# Patient Record
Sex: MALE | Race: BLACK OR AFRICAN AMERICAN | NOT HISPANIC OR LATINO | Employment: OTHER | URBAN - METROPOLITAN AREA
[De-identification: names, ages, dates, MRNs, and addresses within clinical notes are randomized per-mention and may not be internally consistent; named-entity substitution may affect disease eponyms.]

---

## 2019-06-01 LAB
CHOL/HDLC RATIO: 4.9
CHOLEST SERPL-MSCNC: 187 MG/DL (ref 0–200)
HDLC SERPL-MCNC: 38 MG/DL
LDLC SERPL CALC-MCNC: 138 MG/DL
TRIGL SERPL-MCNC: 56 MG/DL
TSH: 0.85

## 2019-06-13 LAB
ANION GAP SERPL CALC-SCNC: 8 MMOL/L
CALCIUM SERPL-MCNC: 8.6 MG/DL
CHLORIDE: 104
CO2 SERPL-SCNC: 23 MMOL/L
CREAT SERPL-MCNC: 1.2 MG/DL
GLUCOSE: 96
POTASSIUM: 4.2
SODIUM: 135
UREA NITROGEN (BUN): 14

## 2019-07-18 ENCOUNTER — PATIENT OUTREACH (OUTPATIENT)
Dept: ADMINISTRATIVE | Facility: HOSPITAL | Age: 69
End: 2019-07-18

## 2019-07-19 ENCOUNTER — LAB VISIT (OUTPATIENT)
Dept: LAB | Facility: HOSPITAL | Age: 69
End: 2019-07-19
Attending: FAMILY MEDICINE
Payer: MEDICARE

## 2019-07-19 ENCOUNTER — OFFICE VISIT (OUTPATIENT)
Dept: FAMILY MEDICINE | Facility: CLINIC | Age: 69
End: 2019-07-19
Payer: MEDICARE

## 2019-07-19 VITALS
DIASTOLIC BLOOD PRESSURE: 76 MMHG | SYSTOLIC BLOOD PRESSURE: 114 MMHG | WEIGHT: 149 LBS | BODY MASS INDEX: 24.83 KG/M2 | HEIGHT: 65 IN | TEMPERATURE: 98 F

## 2019-07-19 DIAGNOSIS — Z12.11 COLON CANCER SCREENING: ICD-10-CM

## 2019-07-19 DIAGNOSIS — R73.03 PREDIABETES: ICD-10-CM

## 2019-07-19 DIAGNOSIS — Z23 NEED FOR PNEUMOCOCCAL VACCINATION: ICD-10-CM

## 2019-07-19 DIAGNOSIS — Z11.59 NEED FOR HEPATITIS C SCREENING TEST: ICD-10-CM

## 2019-07-19 DIAGNOSIS — Z79.899 ENCOUNTER FOR LONG-TERM (CURRENT) USE OF MEDICATIONS: ICD-10-CM

## 2019-07-19 DIAGNOSIS — I63.011 CEREBROVASCULAR ACCIDENT (CVA) DUE TO THROMBOSIS OF RIGHT VERTEBRAL ARTERY: Primary | ICD-10-CM

## 2019-07-19 PROBLEM — I63.9 CEREBROVASCULAR ACCIDENT (CVA): Status: ACTIVE | Noted: 2019-05-31

## 2019-07-19 PROBLEM — N17.9 ACUTE KIDNEY INJURY: Status: ACTIVE | Noted: 2019-06-08

## 2019-07-19 PROBLEM — I69.398 ABNORMALITY OF GAIT AS LATE EFFECT OF CEREBROVASCULAR ACCIDENT (CVA): Status: ACTIVE | Noted: 2019-06-04

## 2019-07-19 PROBLEM — R26.9 ABNORMALITY OF GAIT AS LATE EFFECT OF CEREBROVASCULAR ACCIDENT (CVA): Status: ACTIVE | Noted: 2019-06-04

## 2019-07-19 LAB
ESTIMATED AVG GLUCOSE: 128 MG/DL (ref 68–131)
HBA1C MFR BLD HPLC: 6.1 % (ref 4–5.6)

## 2019-07-19 PROCEDURE — G0009 ADMIN PNEUMOCOCCAL VACCINE: HCPCS | Mod: S$GLB,ICN,, | Performed by: FAMILY MEDICINE

## 2019-07-19 PROCEDURE — 86803 HEPATITIS C AB TEST: CPT

## 2019-07-19 PROCEDURE — 99205 OFFICE O/P NEW HI 60 MIN: CPT | Mod: 25,S$GLB,ICN, | Performed by: FAMILY MEDICINE

## 2019-07-19 PROCEDURE — G0009 PNEUMOCOCCAL CONJUGATE VACCINE 13-VALENT LESS THAN 5YO & GREATER THAN: ICD-10-PCS | Mod: S$GLB,ICN,, | Performed by: FAMILY MEDICINE

## 2019-07-19 PROCEDURE — 99999 PR PBB SHADOW E&M-EST. PATIENT-LVL IV: ICD-10-PCS | Mod: PBBFAC,,, | Performed by: FAMILY MEDICINE

## 2019-07-19 PROCEDURE — 99999 PR PBB SHADOW E&M-EST. PATIENT-LVL IV: CPT | Mod: PBBFAC,,, | Performed by: FAMILY MEDICINE

## 2019-07-19 PROCEDURE — 99205 PR OFFICE/OUTPT VISIT, NEW, LEVL V, 60-74 MIN: ICD-10-PCS | Mod: 25,S$GLB,ICN, | Performed by: FAMILY MEDICINE

## 2019-07-19 PROCEDURE — 36415 COLL VENOUS BLD VENIPUNCTURE: CPT | Mod: PO

## 2019-07-19 PROCEDURE — 1101F PT FALLS ASSESS-DOCD LE1/YR: CPT | Mod: CPTII,S$GLB,ICN, | Performed by: FAMILY MEDICINE

## 2019-07-19 PROCEDURE — 1101F PR PT FALLS ASSESS DOC 0-1 FALLS W/OUT INJ PAST YR: ICD-10-PCS | Mod: CPTII,S$GLB,ICN, | Performed by: FAMILY MEDICINE

## 2019-07-19 PROCEDURE — 90670 PCV13 VACCINE IM: CPT | Mod: S$GLB,ICN,, | Performed by: FAMILY MEDICINE

## 2019-07-19 PROCEDURE — 83036 HEMOGLOBIN GLYCOSYLATED A1C: CPT

## 2019-07-19 PROCEDURE — 90670 PNEUMOCOCCAL CONJUGATE VACCINE 13-VALENT LESS THAN 5YO & GREATER THAN: ICD-10-PCS | Mod: S$GLB,ICN,, | Performed by: FAMILY MEDICINE

## 2019-07-19 RX ORDER — HYDRALAZINE HYDROCHLORIDE 25 MG/1
TABLET, FILM COATED ORAL
Refills: 1 | COMMUNITY
Start: 2019-07-09 | End: 2019-08-09 | Stop reason: SDUPTHER

## 2019-07-19 RX ORDER — ATORVASTATIN CALCIUM 40 MG/1
TABLET, FILM COATED ORAL
Refills: 1 | COMMUNITY
Start: 2019-07-09 | End: 2019-08-09 | Stop reason: SDUPTHER

## 2019-07-19 RX ORDER — ASPIRIN 81 MG/1
TABLET ORAL
Refills: 1 | COMMUNITY
Start: 2019-07-09 | End: 2019-08-09 | Stop reason: SDUPTHER

## 2019-07-19 RX ORDER — AMLODIPINE BESYLATE 10 MG/1
TABLET ORAL
Refills: 1 | COMMUNITY
Start: 2019-07-09 | End: 2019-08-09 | Stop reason: SDUPTHER

## 2019-07-19 RX ORDER — POTASSIUM CHLORIDE 20 MEQ/1
TABLET, EXTENDED RELEASE ORAL
Refills: 1 | COMMUNITY
Start: 2019-07-09 | End: 2019-08-09 | Stop reason: SDUPTHER

## 2019-07-19 RX ORDER — FAMOTIDINE 20 MG/1
TABLET, FILM COATED ORAL
Refills: 1 | COMMUNITY
Start: 2019-07-09 | End: 2019-08-09 | Stop reason: SDUPTHER

## 2019-07-19 NOTE — PROGRESS NOTES
Subjective:      Patient ID: Jose R Coffman is a 69 y.o. male.    Chief Complaint: Establish Care      Problem List Items Addressed This Visit     Cerebrovascular accident (CVA) - Primary    Overview     Subacute.  New problem.  Patient experienced stroke starting May 31, 2019.  Patient was started on aspirin statin and blood pressure medications.  Blood pressure well controlled today.  Patient to go to rehab for few days.  Was having some lower extremity weakness and left upper arm weakness.  His strength is improving.  Still continuing physical therapy at [facility].    MRI report from [facility]:   1.  Small acute ischemic infarct of the posterior limb of the right internal capsule.   2.  Chronic lacunar infarcts of the right basal ganglia and right cerebellar hemisphere.  3. Minimal white matter changes, although nonspecific, likely indicate leukomalacia of chronic microvascular ischemia.         Electronically signed by Ger Arthur MD on 5/31/2019 1:23 PM         Current Assessment & Plan     Reviewed records from previous hospitalization.  Continue physical therapy.  Continue medications for blood pressure as well as aspirin statin and checking diabetes status.         Colon cancer screening    Overview     Patient has never had screening colonoscopy.  Denies family history of colon cancer.         Prediabetes    Overview     Last hemoglobin A1c was a couple of years ago was prediabetic range.  Patient not currently on any metformin or other diabetic medication.  Recently diagnosed with stroke last month.    Patient not currently seeing eye doctor.  Patient has no issues with his feet currently.         Need for hepatitis C screening test    Overview     Patient has never been screened for hepatitis C.  Born in 1950.         Need for pneumococcal vaccination    Overview     Patient has never had pneumonia vaccine.  He is 69 years old and has prediabetes.         Encounter for long-term (current) use of  medications    Overview     07/19/2019   Patient is on CHRONIC long-term drug therapy for managed conditions. See medication list. Reports compliance.  No side effects reported.  Routine lab work is being monitored.  Patient does not need refills today.     Reviewed blood work from recent hospitalization.  Patient has hyperglycemia without recent A1c.  Patient was prediabetic years ago.  Not currently on metformin.  Creatinine was preserved.  Blood counts were normal.    TSH has been normal in the past.    No results found for: WBC, HGB, HCT, MCV, PLT   CMP  Sodium   Date Value Ref Range Status   06/13/2019 135  Final     Potassium   Date Value Ref Range Status   06/13/2019 4.2  Final     Chloride   Date Value Ref Range Status   06/13/2019 104  Final     CO2   Date Value Ref Range Status   06/13/2019 23 mmol/L Final     BUN, Bld   Date Value Ref Range Status   06/13/2019 14  Final     Creatinine   Date Value Ref Range Status   06/13/2019 1.2 mg/dL Final     Calcium   Date Value Ref Range Status   06/13/2019 8.6 mg/dL Final     Anion Gap   Date Value Ref Range Status   06/13/2019 8 mmol/L Final     No results found for: TSH            Current Assessment & Plan     Complete history and physical was completed today.  Complete and thorough medication reconciliation was performed.  Discussed risks and benefits of medications.  Advised patient on orders and health maintenance.  We discussed old records and old labs if available.  Will request any records not available through epic.  Continue current medications listed on your summary sheet.                  Past Medical History:  Past Medical History:   Diagnosis Date    Hyperlipidemia     Hypertension     Stroke      History reviewed. No pertinent surgical history.  Review of patient's allergies indicates:  No Known Allergies  Current Outpatient Medications on File Prior to Visit   Medication Sig Dispense Refill    amLODIPine (NORVASC) 10 MG tablet Take 1 tablet (10  mg total) by mouth daily  1    aspirin (ECOTRIN) 81 MG EC tablet Chew 1 tablet (81 mg total) by mouth daily  1    atorvastatin (LIPITOR) 40 MG tablet Take 1 tablet (40 mg total) by mouth daily  1    famotidine (PEPCID) 20 MG tablet Take 1 tablet (20 mg total) by mouth 2 (two) times daily  1    hydrALAZINE (APRESOLINE) 25 MG tablet Take 1 tablet (25 mg total) by mouth 3 (three) times daily  1    potassium chloride SA (K-DUR,KLOR-CON) 20 MEQ tablet Take 1 tablet (20 mEq total) by mouth daily  1     No current facility-administered medications on file prior to visit.      Social History     Socioeconomic History    Marital status:      Spouse name: Not on file    Number of children: Not on file    Years of education: Not on file    Highest education level: Not on file   Occupational History    Not on file   Social Needs    Financial resource strain: Not on file    Food insecurity:     Worry: Not on file     Inability: Not on file    Transportation needs:     Medical: Not on file     Non-medical: Not on file   Tobacco Use    Smoking status: Never Smoker   Substance and Sexual Activity    Alcohol use: Not on file    Drug use: Not on file    Sexual activity: Not on file   Lifestyle    Physical activity:     Days per week: Not on file     Minutes per session: Not on file    Stress: Not on file   Relationships    Social connections:     Talks on phone: Not on file     Gets together: Not on file     Attends Anabaptism service: Not on file     Active member of club or organization: Not on file     Attends meetings of clubs or organizations: Not on file     Relationship status: Not on file   Other Topics Concern    Not on file   Social History Narrative    Not on file     Family History   Problem Relation Age of Onset    Cancer Mother         throat    Heart disease Father     Heart disease Brother     Cancer Brother        I have reviewed the complete PMH, social history, surgical history,  "allergies and medications.  As well as family history.    Review of Systems   Constitutional: Negative for chills, fatigue, fever and unexpected weight change.   HENT: Negative for ear pain and sore throat.    Eyes: Negative for redness and visual disturbance.   Respiratory: Negative for cough and shortness of breath.    Cardiovascular: Negative for chest pain and palpitations.   Gastrointestinal: Negative for nausea and vomiting.   Endocrine: Negative for cold intolerance and heat intolerance.   Genitourinary: Negative for difficulty urinating and hematuria.   Musculoskeletal: Negative for arthralgias and myalgias.   Skin: Negative for rash and wound.   Allergic/Immunologic: Negative for environmental allergies and food allergies.   Neurological: Positive for weakness (Improving since the stroke June 2019). Negative for headaches.   Hematological: Negative for adenopathy. Does not bruise/bleed easily.   Psychiatric/Behavioral: Negative for sleep disturbance. The patient is not nervous/anxious.        Objective:     /75   Temp 98.2 °F (36.8 °C)   Ht 5' 5" (1.651 m)   Wt 67.6 kg (149 lb)   BMI 24.79 kg/m²     Physical Exam   Constitutional: He is oriented to person, place, and time. He appears well-developed and well-nourished. No distress.   HENT:   Head: Normocephalic and atraumatic.   Eyes: Pupils are equal, round, and reactive to light. EOM are normal.   Neck: Normal range of motion. Neck supple.   Cardiovascular: Normal rate, regular rhythm, normal heart sounds and intact distal pulses.   No murmur heard.  Pulmonary/Chest: Effort normal and breath sounds normal. No respiratory distress. He has no wheezes.   Musculoskeletal: Normal range of motion. He exhibits no edema.   Neurological: He is alert and oriented to person, place, and time. No cranial nerve deficit. He exhibits normal muscle tone. He displays no seizure activity. Coordination and gait normal.   Left upper extremity weakness greater than " right.  Bilateral lower extremity slightly weak.   Skin: Skin is warm and dry. Capillary refill takes less than 2 seconds.   Psychiatric: He has a normal mood and affect. His behavior is normal.   Nursing note and vitals reviewed.      Assessment:     1. Cerebrovascular accident (CVA) due to thrombosis of right vertebral artery    2. Colon cancer screening    3. Prediabetes    4. Need for hepatitis C screening test    5. Need for pneumococcal vaccination    6. Encounter for long-term (current) use of medications        Plan:     I have Reviewed and summarized old records.  I have performed thorough medication reconciliation today and discussed risk and benefits of each medication.  I have reviewed labs and discussed with patient.  All questions were answered.  I am requesting old records and will review them once they are available.    Problem List Items Addressed This Visit     Cerebrovascular accident (CVA) - Primary     Reviewed records from previous hospitalization.  Continue physical therapy.  Continue medications for blood pressure as well as aspirin statin and checking diabetes status.         Colon cancer screening     Patient requesting see Dr. reid at [facility].         Relevant Orders    Ambulatory referral to General Surgery    Prediabetes    Relevant Orders    Hemoglobin A1c    Ambulatory referral to Optometry    Need for hepatitis C screening test    Relevant Orders    Hepatitis C antibody    Need for pneumococcal vaccination    Relevant Orders    Pneumococcal Conjugate Vaccine (13 Valent) (IM) (Completed)    Encounter for long-term (current) use of medications     Complete history and physical was completed today.  Complete and thorough medication reconciliation was performed.  Discussed risks and benefits of medications.  Advised patient on orders and health maintenance.  We discussed old records and old labs if available.  Will request any records not available through epic.  Continue current  medications listed on your summary sheet.                 Follow up in about 3 months (around 10/19/2019) for HTN, Med refills.    DISCLAIMER: This note was compiled by using a speech recognition dictation system and therefore please be aware that typographical / speech recognition errors can and do occur.  Please contact me if you see any errors specifically.    aLndon Smith MD  We Offer Telehealth & Same Day Appointments!   Book your Telehealth appointment with me through my nurse or   Clinic appointments on ticckle!    Office: [phone]          Check out my Facebook Page and Follow Me at: CLICK HERE    Check out my website at [facility] by clicking on: CLICK HERE    To Schedule appointments online, go to ticckle: CLICK HERE     Location: [email or web address]    [address] [place] [address]    FAX: [phone]

## 2019-07-19 NOTE — PATIENT INSTRUCTIONS
Healthy Heart Diet  GENERAL INFORMATION:  What is a heart healthy diet? The goal of a heart healthy diet is to decrease your risk for heart disease. There are several health conditions that can increase your risk for heart disease. Some of these conditions include unhealthy blood cholesterol levels, high blood pressure, and obesity (weighing more than your caregiver recommends). If you have any of these conditions, you should make diet and lifestyle changes as part of your treatment plan. People who have had a heart attack or stroke also should follow the heart healthy diet. The heart healthy diet may help to decrease the risk of having another heart attack or stroke.  What should I know about the different types of fat in my diet?   Unhealthy fats: A diet that is high in cholesterol, saturated fat, and trans fat may cause unhealthy cholesterol levels.    Cholesterol: Limit intake of cholesterol to less than 200 mg per day. Cholesterol is found in meat, eggs, and dairy (milk, cheese).    Saturated fat: Limit saturated fat to less than seven percent of total daily calories. Ask your caregiver how many calories you need each day. Saturated fats are found in meat and dairy.    Trans fat: Limit trans fat to less than one percent of total calories. Ask your caregiver how many calories you need each day. Foods that say trans fat free on the label may still have up to 0.5 grams of fat per serving. Trans fats are used in fried and baked foods.  Healthy fats: Unsaturated fats can be healthy for you and can help to improve your cholesterol levels. Increase your intake of healthy fats by replacing saturated and trans fats in your diet with unsaturated fat.     Monounsaturated fats: Monounsaturated fats are found in nuts and vegetable oils, such as olive, canola, safflower, and sunflower.    Polyunsaturated fats: Unsaturated fat can be found in vegetable oils, such as soybean or corn. Omega-3 fats are a type of polyunsaturated  fat that can help to decrease the risk of heart disease. Omega-3 fats are found in fish, such as salmon, herring, trout, and tuna. Omega-3 fats can also be found in plant foods, such as walnuts, flaxseed, soybeans, and canola oil.  What are other diet guidelines I should follow?   Maintain a healthy weight: Your risk of heart disease is higher if you are overweight. Your caregiver may suggest that you lose weight if you are overweight. The following are some diet changes you can make to lose weight.     Eat fewer calories: A healthy way of decreasing calories is to eat fewer foods that have added sugars and fats. Foods that are have added sugars are also high in calories, which can cause you to gain weight. Some foods that have added sugars are sweet drinks (soda and fruit drinks), candy, cakes, cookies, and pies.    Eat smaller portions: You can also decrease calories in your diet by eating smaller portions at each meal and eating fewer snacks. Ask your caregiver for more information about how to lose weight.  Decrease sodium in your diet to less than 2300 mg each day: Sodium is found in table salt and foods that have added salt. A diet that is lower in sodium may decrease blood pressure or prevent high blood pressure. Keep your blood pressure within a normal range to decrease your risk of stroke, heart disease, and heart failure.    Include omega-3 fats in your diet: Eat two servings of fish per week. One serving is about four ounces. Fish is a good source of healthy omega-3 fats. Most fish contain some mercury, but many fish contain levels that are not harmful to most people. Higher amounts of mercury can be harmful to pregnant women and children. Children and pregnant women should avoid eating fish high in mercury, such as shark or swordfish. Fish that have lower amounts of mercury include salmon, canned light tuna, and catfish.    Include high fiber foods in your diet each day: You can decrease your risk of  heart disease by following a diet that is high in fiber. Include fruit and vegetables, legumes (beans), and whole-grain foods in your diet each day to get enough fiber.    Limit alcohol: Limit the amount of alcohol you drink. Drinking too much can damage your brain, heart, and liver. The risk of getting high blood pressure and certain types of cancer are greater for people who drink too much alcohol. Drinking too much alcohol also increases the risk of having a stroke. Women should limit alcohol to one drink a day. Men should limit alcohol to two drinks a day. A drink of alcohol is 12 ounces of beer, or five ounces of wine. One and one-half ounces of liquor, such as whiskey, is one drink of alcohol. If you drink alcohol, talk to your caregiver.   What should I avoid eating and drinking while on a heart healthy diet? Learn to read labels on packaged foods before buying them. Ask your caregiver for more information about how to read food labels. The following foods are high in fat, saturated fat, cholesterol, and sodium.   Bread and other carbohydrates:     High-fat baked goods, such as doughnuts, pastries, cookies, and biscuits.    Chips, snack mixes, regular crackers, and flavored popcorn.    Pretzels, salted nuts (high in sodium).  Fruit and vegetables:     Regular, canned vegetables (high in sodium).    Fried vegetables or vegetables in butter or high-fat sauces.    Fried fruit, or fruit served with cream.  Dairy:     Whole milk, two percent milk, half-and-half creamer.    Cheese, cream cheese, sour cream.  Meats and meat substitutes:     High-fat cuts of meat (T-bone steak, regular hamburger, ribs, ortiz, sausage).    Cold cuts and hot dogs.    Whole eggs and egg yolks (limit to three servings or less per week).  Fats:     Butter, hard margarine, shortening, partially hydrogenated or tropical (coconut, palm) oils.    Other:     Salt or seasonings made with salt (high in sodium).    Soy sauce, miso soup, canned or  dried soups (high in sodium).    Ketchup, barbecue sauce, and other high-sodium sauces.    High-fat gravy and sauces, such as Oziel or cheese sauces.  What can I eat and drink while on a heart healthy diet? Ask your dietitian or caregiver how many servings to eat each day from each of the following groups of foods. The amount of servings you should eat from each food group will depend on your daily calorie needs.   Breads and other carbohydrates:     Whole grain breads, cereals (oatmeal), and pasta.    Brown rice.    Low fat, low-sodium crackers and pretzels.  Fruits and vegetables:     Fresh, frozen, or canned vegetables (no salt or low-sodium).    Fresh, frozen, dried, or canned fruit (canned in light syrup or fruit juice).  Dairy:     Nonfat (skim), one-half percent, or one percent milk.    Nonfat or low fat yogurt or cottage cheese.    Fat free or low fat cheese.  Meats and meat products:     Fish and poultry (chicken, turkey) with no skin.    Lean beef and pork (loin, round, extra lean hamburger).    Dried beans and peas, unsalted nuts, soy products.    Egg whites and substitutes.  Fats:     Unsaturated oils (olive, soy, peanut, canola, safflower, sunflower).    Vegetable oil spreads or soft margarine.    Avocado.  Other:     Herbs and spices in place of salt.    Low fat snacks (unsalted pretzels, plain popcorn).  What are some other lifestyle changes I should make?   Do not smoke: Smoking causes lung cancer and other long-term lung diseases. It increases your risk of many cancer types. Smoking also increases your risk of blood vessel disease, heart attack, and vision disorders. Not smoking may help prevent such symptoms as headaches and dizziness for yourself and those around you. Smokers have shorter lifespans than nonsmokers.     Exercise regularly: Regular exercise can help improve your cholesterol levels and decrease your risk for coronary artery disease. Regular exercise can also help you reach or  maintain a healthy weight. Get 30 minutes or more of moderate exercise or 20 minutes of intense exercise on most days of the week. To lose weight, get at least 60 minutes of exercise on most days of the week. Children should exercise for at least 60 minutes each day. Talk to your caregiver about the best exercise program for you.  What are the risks of not following a heart healthy diet? You may develop heart disease if you do not follow a heart healthy diet. High blood cholesterol puts you at a higher risk for heart disease. Untreated high blood pressure may lead to a stroke. It can also lead to a heart attack or heart or kidney failure. Obesity is linked to medical problems, such as heart disease, high blood pressure, stroke, and diabetes. You may need to follow this diet if you already had a heart attack or stroke. You may be more likely to have another stroke or heart attack if you do not follow this diet.  When should I call my caregiver? You have questions or concerns about your illness, medicine, or this diet.  CARE AGREEMENT:  You have the right to help plan your care. Discuss treatment options with your caregivers to decide what care you want to receive. You always have the right to refuse treatment.

## 2019-07-22 DIAGNOSIS — R73.03 PREDIABETES: Primary | ICD-10-CM

## 2019-07-22 LAB — HCV AB SERPL QL IA: NEGATIVE

## 2019-07-22 RX ORDER — METFORMIN HYDROCHLORIDE 500 MG/1
500 TABLET ORAL DAILY
Qty: 90 TABLET | Refills: 3 | Status: SHIPPED | OUTPATIENT
Start: 2019-07-22 | End: 2019-08-09 | Stop reason: SDUPTHER

## 2019-07-22 NOTE — PROGRESS NOTES
Please call patient with lab results.    Prediabetic.  6.1 A1c.  Start metformin 500 mg once a day.  Repeat level in 3 months.    Hepatitis C screening is negative continue all other medications for stroke prevention.

## 2019-07-22 NOTE — TELEPHONE ENCOUNTER
----- Message from Landon Smith MD sent at 7/22/2019  1:22 PM CDT -----  Please call patient with lab results.    Prediabetic.  6.1 A1c.  Start metformin 500 mg once a day.  Repeat level in 3 months.    Hepatitis C screening is negative continue all other medications for stroke prevention.

## 2019-08-09 RX ORDER — AMLODIPINE BESYLATE 10 MG/1
TABLET ORAL
Qty: 90 TABLET | Refills: 1 | Status: SHIPPED | OUTPATIENT
Start: 2019-08-09 | End: 2019-10-18 | Stop reason: SDUPTHER

## 2019-08-09 RX ORDER — POTASSIUM CHLORIDE 20 MEQ/1
TABLET, EXTENDED RELEASE ORAL
Qty: 90 TABLET | Refills: 1 | Status: SHIPPED | OUTPATIENT
Start: 2019-08-09 | End: 2019-08-09 | Stop reason: SDUPTHER

## 2019-08-09 RX ORDER — ASPIRIN 81 MG/1
TABLET ORAL
Refills: 1 | COMMUNITY
Start: 2019-08-09 | End: 2019-10-18 | Stop reason: SDUPTHER

## 2019-08-09 RX ORDER — AMLODIPINE BESYLATE 10 MG/1
TABLET ORAL
Qty: 90 TABLET | Refills: 1 | Status: SHIPPED | OUTPATIENT
Start: 2019-08-09 | End: 2019-08-09 | Stop reason: SDUPTHER

## 2019-08-09 RX ORDER — FAMOTIDINE 20 MG/1
TABLET, FILM COATED ORAL
Qty: 90 TABLET | Refills: 1 | Status: SHIPPED | OUTPATIENT
Start: 2019-08-09 | End: 2019-10-18 | Stop reason: SDUPTHER

## 2019-08-09 RX ORDER — ATORVASTATIN CALCIUM 40 MG/1
TABLET, FILM COATED ORAL
Qty: 90 TABLET | Refills: 1 | Status: SHIPPED | OUTPATIENT
Start: 2019-08-09 | End: 2019-08-09 | Stop reason: SDUPTHER

## 2019-08-09 RX ORDER — HYDRALAZINE HYDROCHLORIDE 25 MG/1
TABLET, FILM COATED ORAL
Qty: 270 TABLET | Refills: 1 | Status: SHIPPED | OUTPATIENT
Start: 2019-08-09 | End: 2019-10-18 | Stop reason: ALTCHOICE

## 2019-08-09 RX ORDER — POTASSIUM CHLORIDE 20 MEQ/1
TABLET, EXTENDED RELEASE ORAL
Qty: 90 TABLET | Refills: 1 | Status: SHIPPED | OUTPATIENT
Start: 2019-08-09 | End: 2020-01-08 | Stop reason: SDUPTHER

## 2019-08-09 RX ORDER — HYDRALAZINE HYDROCHLORIDE 25 MG/1
TABLET, FILM COATED ORAL
Qty: 270 TABLET | Refills: 1 | Status: SHIPPED | OUTPATIENT
Start: 2019-08-09 | End: 2019-08-09 | Stop reason: SDUPTHER

## 2019-08-09 RX ORDER — ATORVASTATIN CALCIUM 40 MG/1
TABLET, FILM COATED ORAL
Qty: 90 TABLET | Refills: 1 | Status: SHIPPED | OUTPATIENT
Start: 2019-08-09 | End: 2019-10-18 | Stop reason: SDUPTHER

## 2019-08-09 RX ORDER — METFORMIN HYDROCHLORIDE 500 MG/1
500 TABLET ORAL DAILY
Qty: 90 TABLET | Refills: 3 | Status: SHIPPED | OUTPATIENT
Start: 2019-08-09 | End: 2019-10-18 | Stop reason: SINTOL

## 2019-08-09 NOTE — TELEPHONE ENCOUNTER
----- Message from Gricelda Wilhelm sent at 8/9/2019  3:20 PM CDT -----  Contact: spouse-mickey  Type:  RX Refill Request    Who Called: spouse  Refill or New Rx:refill  RX Name and Strength:norvasc-10mg/aspirin-81mg/lipotorol-40mg/pepcis/20mg/apresoline-25mg/potassium  How is the patient currently taking it? (ex. 1XDay):1xday/1xday/1xday/2xday/3xday/1xday  Is this a 30 day or 90 day RX:n/a  Preferred Pharmacy with phone number:  [facility] [address] [address] [address]  Phone: [phone] Fax: [phone]  Local or Mail Order:local  Ordering Provider:tony  Would the patient rather a call back or a response via [facility]? Call back  Best Call Back Number:[phone]  Additional Information: none    Thanks,  Gricelda Wilhelm

## 2019-08-09 NOTE — TELEPHONE ENCOUNTER
----- Message from Neelam Julio sent at 8/9/2019  8:46 AM CDT -----  Contact: wife  1. What is the name of the medication you are requesting? All meds  2. What is the dose? n/a  3. How do you take the medication? Orally, topically, etc? n/a  4. How often do you take this medication? n/a  5. Do you need a 30 day or 90 day supply? n/a  6. How many refills are you requesting? n/a  7. What is your preferred pharmacy and location of the pharmacy? [facility]  8. Who can we contact with further questions? The pt at [phone]

## 2019-10-17 ENCOUNTER — PATIENT OUTREACH (OUTPATIENT)
Dept: ADMINISTRATIVE | Facility: HOSPITAL | Age: 69
End: 2019-10-17

## 2019-10-18 ENCOUNTER — OFFICE VISIT (OUTPATIENT)
Dept: FAMILY MEDICINE | Facility: CLINIC | Age: 69
End: 2019-10-18
Payer: MEDICARE

## 2019-10-18 VITALS
TEMPERATURE: 98 F | DIASTOLIC BLOOD PRESSURE: 81 MMHG | BODY MASS INDEX: 24.89 KG/M2 | SYSTOLIC BLOOD PRESSURE: 137 MMHG | HEART RATE: 76 BPM | HEIGHT: 65 IN | WEIGHT: 149.38 LBS

## 2019-10-18 DIAGNOSIS — I63.011 CEREBROVASCULAR ACCIDENT (CVA) DUE TO THROMBOSIS OF RIGHT VERTEBRAL ARTERY: ICD-10-CM

## 2019-10-18 DIAGNOSIS — Z23 NEED FOR INFLUENZA VACCINATION: ICD-10-CM

## 2019-10-18 DIAGNOSIS — E78.5 HYPERLIPIDEMIA, UNSPECIFIED HYPERLIPIDEMIA TYPE: ICD-10-CM

## 2019-10-18 DIAGNOSIS — Z12.11 COLON CANCER SCREENING: ICD-10-CM

## 2019-10-18 DIAGNOSIS — R73.03 PREDIABETES: ICD-10-CM

## 2019-10-18 DIAGNOSIS — Z79.899 ENCOUNTER FOR LONG-TERM (CURRENT) USE OF MEDICATIONS: Primary | ICD-10-CM

## 2019-10-18 DIAGNOSIS — I69.354 HEMIPARESIS OF LEFT NONDOMINANT SIDE AS LATE EFFECT OF CEREBRAL INFARCTION: ICD-10-CM

## 2019-10-18 DIAGNOSIS — I10 ESSENTIAL HYPERTENSION: ICD-10-CM

## 2019-10-18 DIAGNOSIS — Z12.5 SCREENING PSA (PROSTATE SPECIFIC ANTIGEN): ICD-10-CM

## 2019-10-18 DIAGNOSIS — R01.1 HEART MURMUR: ICD-10-CM

## 2019-10-18 DIAGNOSIS — G63 NEUROPATHY DUE TO MEDICAL CONDITION: ICD-10-CM

## 2019-10-18 PROBLEM — N17.9 ACUTE KIDNEY INJURY: Status: RESOLVED | Noted: 2019-06-08 | Resolved: 2019-10-18

## 2019-10-18 LAB
ALBUMIN/CREAT UR: 5.5 UG/MG (ref 0–30)
CREAT UR-MCNC: 293 MG/DL (ref 23–375)
MICROALBUMIN UR DL<=1MG/L-MCNC: 16 UG/ML

## 2019-10-18 PROCEDURE — 90662 FLU VACCINE - HIGH DOSE (65+) PRESERVATIVE FREE IM: ICD-10-PCS | Mod: S$GLB,,, | Performed by: FAMILY MEDICINE

## 2019-10-18 PROCEDURE — 1101F PR PT FALLS ASSESS DOC 0-1 FALLS W/OUT INJ PAST YR: ICD-10-PCS | Mod: S$GLB,,, | Performed by: FAMILY MEDICINE

## 2019-10-18 PROCEDURE — 90662 IIV NO PRSV INCREASED AG IM: CPT | Mod: S$GLB,,, | Performed by: FAMILY MEDICINE

## 2019-10-18 PROCEDURE — 99999 PR PBB SHADOW E&M-EST. PATIENT-LVL IV: CPT | Mod: PBBFAC,,, | Performed by: FAMILY MEDICINE

## 2019-10-18 PROCEDURE — G0008 ADMIN INFLUENZA VIRUS VAC: HCPCS | Mod: S$GLB,,, | Performed by: FAMILY MEDICINE

## 2019-10-18 PROCEDURE — 1101F PT FALLS ASSESS-DOCD LE1/YR: CPT | Mod: S$GLB,,, | Performed by: FAMILY MEDICINE

## 2019-10-18 PROCEDURE — 99215 PR OFFICE/OUTPT VISIT, EST, LEVL V, 40-54 MIN: ICD-10-PCS | Mod: 25,S$GLB,, | Performed by: FAMILY MEDICINE

## 2019-10-18 PROCEDURE — 99215 OFFICE O/P EST HI 40 MIN: CPT | Mod: 25,S$GLB,, | Performed by: FAMILY MEDICINE

## 2019-10-18 PROCEDURE — 99999 PR PBB SHADOW E&M-EST. PATIENT-LVL IV: ICD-10-PCS | Mod: PBBFAC,,, | Performed by: FAMILY MEDICINE

## 2019-10-18 PROCEDURE — G0008 FLU VACCINE - HIGH DOSE (65+) PRESERVATIVE FREE IM: ICD-10-PCS | Mod: S$GLB,,, | Performed by: FAMILY MEDICINE

## 2019-10-18 PROCEDURE — 82043 UR ALBUMIN QUANTITATIVE: CPT

## 2019-10-18 RX ORDER — LISINOPRIL 20 MG/1
20 TABLET ORAL DAILY
Qty: 90 TABLET | Refills: 3 | Status: SHIPPED | OUTPATIENT
Start: 2019-10-18 | End: 2019-10-18 | Stop reason: SINTOL

## 2019-10-18 RX ORDER — ASPIRIN 81 MG/1
TABLET ORAL
Refills: 1 | COMMUNITY
Start: 2019-10-18 | End: 2019-12-04 | Stop reason: SDUPTHER

## 2019-10-18 RX ORDER — AMLODIPINE BESYLATE 10 MG/1
TABLET ORAL
Qty: 90 TABLET | Refills: 1 | Status: SHIPPED | OUTPATIENT
Start: 2019-10-18 | End: 2019-12-04 | Stop reason: SDUPTHER

## 2019-10-18 RX ORDER — CHLORTHALIDONE 25 MG/1
25 TABLET ORAL DAILY
Qty: 90 TABLET | Refills: 3 | Status: SHIPPED | OUTPATIENT
Start: 2019-10-18 | End: 2019-12-04 | Stop reason: SDUPTHER

## 2019-10-18 RX ORDER — GABAPENTIN 300 MG/1
300 CAPSULE ORAL 3 TIMES DAILY
Qty: 90 CAPSULE | Refills: 11 | Status: SHIPPED | OUTPATIENT
Start: 2019-10-18 | End: 2020-01-08 | Stop reason: SDUPTHER

## 2019-10-18 RX ORDER — FAMOTIDINE 20 MG/1
TABLET, FILM COATED ORAL
Qty: 90 TABLET | Refills: 1 | Status: SHIPPED | OUTPATIENT
Start: 2019-10-18 | End: 2020-01-08 | Stop reason: SDUPTHER

## 2019-10-18 RX ORDER — ATORVASTATIN CALCIUM 40 MG/1
TABLET, FILM COATED ORAL
Qty: 90 TABLET | Refills: 1 | Status: SHIPPED | OUTPATIENT
Start: 2019-10-18 | End: 2019-12-04 | Stop reason: SDUPTHER

## 2019-10-18 NOTE — ASSESSMENT & PLAN NOTE
Patient strongly advised to follow up with Dr. reid for repeat colonoscopy.  We also need record of his colon cancer screening from [facility].    Patient requesting see Dr. reid at [facility].

## 2019-10-18 NOTE — ASSESSMENT & PLAN NOTE
"Update October 2019:  Blood pressure is controlled today on hydralazine and amlodipine.  Patient wants to stop hydralazine due to intermittent compliance from 3 times a day.  Patient reports that he cannot take lisinopril due to causing his stroke " and giving him a cough.  I will changes hydralazine to chlorthalidone and recheck his blood pressure in two weeks.  Patient will follow up with me in four weeks.  Patient will notify me blood pressure is not controlled at home.  Patient reports that his wife takes his blood pressure.    Prediabetes:  Patient is due for hemoglobin A1c.  Patient cannot take metformin due to GI side effects.  Switching patient to Januvia.    Patient remains on statin and aspirin.  Checking lipid panel.    ======================================================  Initial plan:  Reviewed records from previous hospitalization.  Continue physical therapy.  Continue medications for blood pressure as well as aspirin statin and checking diabetes status.  "

## 2019-10-18 NOTE — ASSESSMENT & PLAN NOTE
Continue home exercise program provided by physical therapy.    ER precautions if symptoms or signs get worse or change.

## 2019-10-18 NOTE — PROGRESS NOTES
Subjective:      Patient ID: Jose R Coffman is a 69 y.o. male.  has a past medical history of Hyperlipidemia, Hypertension, and Stroke.     Chief Complaint: Follow-up; Hypertension; Hyperlipidemia; and Flu Vaccine      Problem List Items Addressed This Visit     Cerebrovascular accident (CVA)    Overview     Initial HPI:  Subacute.  New problem.  Patient experienced stroke starting May 31, 2019.  Patient was started on aspirin statin and blood pressure medications.  Blood pressure well controlled today.  Patient to go to rehab for few days.  Was having some lower extremity weakness and left upper arm weakness.  His strength is improving.  Still continuing physical therapy at [facility].    MRI report from [facility]:   1.  Small acute ischemic infarct of the posterior limb of the right internal capsule.   2.  Chronic lacunar infarcts of the right basal ganglia and right cerebellar hemisphere.  3. Minimal white matter changes, although nonspecific, likely indicate leukomalacia of chronic microvascular ischemia.         Electronically signed by Ger Arthur MD on 5/31/2019 1:23 PM  =================================================  [facility] DISCHARGE SUMMARY    Patient ID:  Jose R Coffman  [id number]  69 y.o.  1950    Admit Date:   5/31/2019 9:54 AM    Discharge Date:   Discharge Today: 6/4/2019    Admitting Physician:   German Hansen MD     Discharge Physician:   PORTIA GUTHRIE MD    Consults:  Consultants   Provider Service Role Specialty   Nile-Adonis Ortiz MD -- Consulting Physician Neurology       Reason for Admission/Admission Diagnoses:   Present on Admission:   Cerebrovascular accident (CVA) ([facility])    Discharge Diagnoses:     Right MCA stroke involving posterior limb of internal capsule  Likely due to arthrosclerotic disease.  MRI showing Small acute ischemic infarct of the posterior limb of the right internal capsule.  CTA of head and neck,   -No flow-limiting stenosis or embolus is  identified.  -Calcified atherosclerotic plaque formation of the internal carotid arteries at siphon level.  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  Check Echo showed EF of 65% and no left atrial clot. Doppler carotids less than 50% stenosis bilaterally, , TSH 0.85  Cont Aspirin, lipitor . Neurology following patient.   Accepted by [facility] for In pT rehab, discharge to [facility]       HTN   Continue meds.     HLD  On lipitor      Right kidney measuring 6.8 x 5.8 cm.    There are a few other smaller cysts involving the kidneys  - PCP to arrange for out Patient urology for monitoring of cyst.    Active Hospital Problems   Diagnosis Date Noted    Cerebrovascular accident (CVA) ([facility]) 05/31/2019     Resolved Hospital Problems     History of Present Illness:   69 years old AA male with PMHx of HTN / HLD / CVA was brought to the ED with complaints of possible CVA.   Was transferred from an out side facility for the evaluation of CVA.   His symptoms developed 24 hrs prior to admission with symptoms of Slurred speech and left Upper extremity numbness.  Ct Scan showed some old CVA - which family did not know about it.   Wife indicated his BP was 200's/ 100's - She indicated He is compliance with his treatment.     Hospital Course and Treatment:     In the hospital, CT brain showed old infarct and He was admitted for acute CVA. MRI brain was diagnostic for Rt internal capsule infarct, CTA head and neck didn't show any significant vascular disease.  He was seen in consult by Nephrology and was managed conservatively  He is currently stable for discharge to In Pt rehab.  He reported abdominal discomfort for which Ct abd showed incidental Rt renal cyst, without any symptom.  He was notified of this finding and was advised to f/up his PCP on Discharge, who should arrange for Out Pt urology evaluation.    Admission Information   Date & Time  5/31/2019 Provider  Braydon Gonzáles MD Department   Telemetry  [facility] Phone  [phone]       I discussed with the patient disease process and treatment.     I have personally seen and examined the patient, Jose R Coffman, in a face to face encounter on the date of discharge.     He is cleared for discharge with instructions to follow up as directed.   Total time in the care and discharge planning of this patient was less than 30 minutes.    Significant Diagnostic Studies:  Recent Imaging and Procedure Results   Procedure Component Value Ref Range Date/Time   CT Abdomen Pelvis W/WO Contrast [[id number]] Collected: 06/03/2019 1508   Updated: 06/03/2019 1514   Narrative:   EXAM: CT SCAN OF THE ABDOMEN AND PELVIS WITH AND WITHOUT CONTRAST    HISTORY: Left lower abdominal pain    TECHNIQUE: Axial images were acquired. Patient was given IV contrast    FINDINGS:     The lung bases are clear.  The liver is normal. The gallbladder is unremarkable.  The spleen is normal.  No pancreatic abnormality is identified.  The adrenal glands are normal.  There is a large cyst involving the right kidney measuring 6.8 x 5.8 cm. No renal masses. There couple small cysts noted elsewhere involving the kidneys. No hydronephrosis.  Atherosclerosis of the abdominal aorta. No evidence of aneurysm.  No evidence of bowel obstruction. Normal appendix. No evidence of diverticulitis.  There are no abnormal masses in the pelvis. There are no abnormal fluid collections in the pelvis.  Urinary bladder is normal.  No significant osseous abnormality is identified.    IMPRESSION: No acute findings. There is a large cyst involving the right kidney measuring 6.8 x 5.8 cm. There are a few other smaller cysts involving the kidneys.    All CT scans at [this location] are performed using dose modulation techniques as appropriate to a performed exam including the following: automated exposure control; adjustment of the mA and/or kV according to patient size (this includes techniques or   standardized protocols for  targeted exams where dose is matched to indication / reason for exam; i.e. extremities or head); use of iterative reconstruction technique.     Finalized on: 6/3/2019 3:12 PM By: Jefe Guillen MD  [facility]# [id number] 2019-06-03 15:14:45.207 [facility]   CT Angiogram Head W/WO Contrast [[id number]] Collected: 06/01/2019 1237   Updated: 06/01/2019 1309   Narrative:   REASON FOR EXAM: Acute CVA / worsening symptoms.     TECHNICAL FACTORS: Intravenous contrast images were obtained of the head with image postprocessing, including 3-D volume rendering reconstruction. Non-intravenous contrast  images were obtained. Images are stored in the patient's permanent record.    COMPARISON: None    FINDINGS: The internal carotid, anterior cerebral, middle cerebral, and anterior communicating arteries are patent without evidence of stenosis or embolus.There is congenital absence of the right A1 segment. There is minimal calcified atherosclerotic   plaque formation of the internal carotid arteries at siphon level. The vertebral, basilar, and posterior cerebral arteries are patent without evidence of stenosis or embolus. There is no evidence of aneurysm.     IMPRESSION:  1. No flow-limiting stenosis or embolus is identified.  2. Calcified atherosclerotic plaque formation of the internal carotid arteries at siphon level.  3. 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  Electronically signed by Sammy Valverde MD on 6/1/2019 1:06 PM    CT Head WO Contrast [[id number]] Collected: 06/01/2019 0807   Updated: 06/01/2019 0815   Narrative:   REASON FOR EXAM: Worsening CVA ? possible bleed.     TECHNICAL FACTORS: 5 mm contiguous axial CT images were obtained from the foramen magnum to the skull vertex.     COMPARISON: CT brain 05/31/2019. MRI brain 03/31/2019.    FINDINGS: The ventricular system is within normal limits. Cortical sulci are minimally deepened. Perimesencephalic, quadrigeminal and suprasellar cisterns appear normal. A 6 x 12 mm well-defined  "low-density area is present within the anterior limb of the   right internal capsule, unchanged. There is a new 9 x 9 mm ill-defined low-density area within the posterior limb of right internal capsule, corresponding in location and size to the MRI finding. The white-grey matter interface is preserved. No   intracranial hemorrhage is identified.    The calvarium is intact. Middle ear and mastoid areas are aerated. Paranasal sinuses are aerated. Orbit contents appear unremarkable.    Impression:   1. Evolving, acute microvascular ischemic infarct in the posterior limb of the right internal capsule similar in extent to MRI 05/31/2019.  2. Old lacunar infarct in the anterior limb of the right internal capsule.  3. Minimally diminished brain volume.   ================================================================================         Current Assessment & Plan     Update October 2019:  Blood pressure is controlled today on hydralazine and amlodipine.  Patient wants to stop hydralazine due to intermittent compliance from 3 times a day.  Patient reports that he cannot take lisinopril due to causing his stroke " and giving him a cough.  I will changes hydralazine to chlorthalidone and recheck his blood pressure in two weeks.  Patient will follow up with me in four weeks.  Patient will notify me blood pressure is not controlled at home.  Patient reports that his wife takes his blood pressure.    Prediabetes:  Patient is due for hemoglobin A1c.  Patient cannot take metformin due to GI side effects.  Switching patient to Januvia.    Patient remains on statin and aspirin.  Checking lipid panel.    ======================================================  Initial plan:  Reviewed records from previous hospitalization.  Continue physical therapy.  Continue medications for blood pressure as well as aspirin statin and checking diabetes status.         Hyperlipemia    Overview       This condition is chronic.  Currently stable.  " Patient reports compliance with hyperlipidemia treatment as prescribed.  No side effects reported at this time.    Lab Results   Component Value Date    CHOL 187 06/01/2019     Lab Results   Component Value Date    HDL 38 06/01/2019     Lab Results   Component Value Date    LDLCALC 138 06/01/2019     Lab Results   Component Value Date    TRIG 56 06/01/2019     No results found for: CHOLHDL    No results found for: ALT, AST, GGT, ALKPHOS, BILITOT    The patient denies any myalgias, chest pain, shortness of breath, abdominal pain, nausea, vomiting, constipation, diarrhea, jaundice, skin changes at this time.           Current Assessment & Plan     Discussed hyperlipidemia disease course.  Discussed the risk of cardiovascular disease, increase stroke and heart attack risk.  Patient voiced understanding and understood the treatment plan. All questions were answered.  Discussed healthy diet and increased need for exercise.           Hypertension    Overview       This condition is chronic.  Currently stable.  Blood pressure is below goal.      Patient reports that he cannot take hydralazine 3 times a day due to intermittent compliance.  Patient reports that lisinopril is contraindicated for him.  Patient says that lisinopril caused his stroke.    The patient denies any chest pain, shortness of breath, palpitations, dizziness, lightheadedness, headache, arm numbness tingling or weakness, syncope.    Creatinine   Date Value Ref Range Status   06/13/2019 1.2 mg/dL Final              Current Assessment & Plan     Stop hydralazine.  Start chlorthalidone.  Close blood pressure follow-up in two weeks with nurse visit and follow up with me in four weeks.  Avoid lisinopril due to patient's saying he has contraindication.    Reviewed kidney function.  Patient is not establish with cardiologist.  Patient does have associated heart murmur.    Discussed hypertension disease course.  Discussed-DASH-diet and exercise.  Discussed  medication regimen importance of treating high blood pressure.  Patient understood and advised of risk of untreated blood pressure.  ER precautions were given for symptoms of hypertensive urgency and emergency.           Colon cancer screening    Overview     Patient has never had screening colonoscopy.  Denies family history of colon cancer.  =========================================  Patient had to reschedule with Dr. Bhardwaj.   ====================================  October 2019:  Patient reports that he had a stool study test for screening colon cancer earlier this year prior to stroke at [facility].  Patient reports that he had to leave his appointment with Dr. bhardwaj.  Rescheduled.         Current Assessment & Plan     Patient strongly advised to follow up with Dr. bhardwaj for repeat colonoscopy.  We also need record of his colon cancer screening from [facility].    Patient requesting see Dr. bhardwaj at [facility].           Prediabetes    Overview     Last hemoglobin A1c was a couple of years ago was prediabetic range.  Patient not currently on any metformin or other diabetic medication.  Recently diagnosed with stroke last month.    Patient not currently seeing eye doctor.  Patient has no issues with his feet currently.  ======================================================  October 2019: Patient reports taking the metformin but had to stop it due to constant diarrhea and nausea.     Diabetes Management Status    Statin: Taking  ACE/ARB: Not taking    Screening or Prevention Patient's value Goal Complete/Controlled?   HgA1C Testing and Control   Lab Results   Component Value Date    HGBA1C 6.1 (H) 07/19/2019      Annually/Less than 8% Yes   Lipid profile : 06/01/2019 Annually Yes   LDL control Lab Results   Component Value Date    LDLCALC 138 06/01/2019    Annually/Less than 100 mg/dl  No   Nephropathy screening No results found for: LABMICR  No results found for: PROTEINUA Annually No   Blood pressure BP  Readings from Last 1 Encounters:   10/18/19 137/81    Less than 140/90 Yes   Dilated retinal exam Most Recent Eye Exam Date: Not Found Annually Yes   Foot exam   Most Recent Foot Exam Date: Not Found Annually Yes              Current Assessment & Plan     Rescheduled eye appt.  Stop metformin due to side effects.  Start Januvia.    Monitor hemoglobin A1c.  Discussed diabetic diet and exercise protocol.    Monitor for side effects.  Discussed checking blood glucose.  Discussed symptoms to monitor for and to notify me immediately if persistent or worsening.  Follow up with Ophthalmology/Optometry and Podiatry.           Encounter for long-term (current) use of medications - Primary    Overview     07/19/2019   Patient is on CHRONIC long-term drug therapy for managed conditions. See medication list. Reports compliance.  No side effects reported.  Routine lab work is being monitored.  Patient does not need refills today.     Reviewed blood work from recent hospitalization.  Patient has hyperglycemia without recent A1c.  Patient was prediabetic years ago.  Not currently on metformin.  Creatinine was preserved.  Blood counts were normal.    TSH has been normal in the past.    No results found for: WBC, HGB, HCT, MCV, PLT   CMP  Sodium   Date Value Ref Range Status   06/13/2019 135  Final     Potassium   Date Value Ref Range Status   06/13/2019 4.2  Final     Chloride   Date Value Ref Range Status   06/13/2019 104  Final     CO2   Date Value Ref Range Status   06/13/2019 23 mmol/L Final     BUN, Bld   Date Value Ref Range Status   06/13/2019 14  Final     Creatinine   Date Value Ref Range Status   06/13/2019 1.2 mg/dL Final     Calcium   Date Value Ref Range Status   06/13/2019 8.6 mg/dL Final     Anion Gap   Date Value Ref Range Status   06/13/2019 8 mmol/L Final     No results found for: TSH   ======================================================    Patient is on CHRONIC long-term drug therapy for managed conditions.  See medication list. Reports compliance.  No side effects reported.  Routine lab work is being monitored.  Patient does need refills today.     No results found for: WBC, HGB, HCT, MCV, PLT   CMP  Sodium   Date Value Ref Range Status   06/13/2019 135  Final     Potassium   Date Value Ref Range Status   06/13/2019 4.2  Final     Chloride   Date Value Ref Range Status   06/13/2019 104  Final     CO2   Date Value Ref Range Status   06/13/2019 23 mmol/L Final     BUN, Bld   Date Value Ref Range Status   06/13/2019 14  Final     Creatinine   Date Value Ref Range Status   06/13/2019 1.2 mg/dL Final     Calcium   Date Value Ref Range Status   06/13/2019 8.6 mg/dL Final     Anion Gap   Date Value Ref Range Status   06/13/2019 8 mmol/L Final     No results found for: TSH              Current Assessment & Plan     Reviewed labs.  Patient is prediabetic and was recently started on new medications.  Repeat A1c.  See diabetes.  Complete history and physical was completed today.  Complete and thorough medication reconciliation was performed.  Discussed risks and benefits of medications.  Advised patient on orders and health maintenance.  We discussed old records and old labs if available.  Will request any records not available through epic.  Continue current medications listed on your summary sheet.           Screening PSA (prostate specific antigen)    Overview     Patient is due for prostate cancer screening.    Component Name  8/16/2018 9/27/2016 6/23/2015 1/13/2014     0.820 1.0 0.7 1.1              Need for influenza vaccination    Neuropathy due to medical condition    Overview     Chronic.  Worsening since his stroke.  Also associated with diabetes.  Patient reports sharp shooting pain in his left arm and leg intermittently.  Patient is requesting something for the pain.         Current Assessment & Plan     Start gabapentin.  Patient advised to try this medication at night 1st and then increase as tolerated.  Risk and  benefits were discussed.         Hemiparesis of left nondominant side as late effect of cerebral infarction    Overview     Chronic.  Result of his stroke and May and 2019.  Patient reports he still very weak in his left hand.  Patient has decreased strength in the left upper and lower extremity         Current Assessment & Plan     Continue home exercise program provided by physical therapy.    ER precautions if symptoms or signs get worse or change.         Heart murmur    Overview      [facility]                                                                    [address]                                                               [place] [address]                                                                  [phone]                                   Adult Echocardiogram  Name: MACKENZIE CARTER                       Study Date: 2019  MRN: [id number]                                Age: 69 yrs  [place] [facility] [facility] [place]\S\[facility]19             Height: 64 in  : 1950                             Weight: 162 lb  Gender: Male                                BSA: 1.8 m2  Reason For Study: LUE weakness  History: LUE weakness    Ordering Physician: LIONEL ZELAYA  Performed By: Pat Mullins CCS    Interpretation Summary  Ejection Fraction = 60-65%.  Left ventricular systolic function is normal.  There is mild concentric left ventricular hypertrophy.  There is mild mitral annular calcification.        Measurements with Normals  RVDd: 2.7 cm       (1.9-3.8 cm)  IVSd: 1.1 cm       (0.6-1.1 cm)        LVIDd: 4.0 cm       (3.7-5.6 cm)  LVPWd: 1.1 cm      (0.6-1.1 cm)        LVIDs: 2.7 cm       (2.3-3.9 cm)  Ao root diam:      (2.0-3.7 cm)  2.9 cm  LVOT diam: 2.1 cm  (2.5 cm)            LA dimension: 3.2 cm(1.9-4.0 cm)    MMode/2D Measurements \T\ Calculations  FS: 32.5 %                           Ao root area: 6.4 cm2  EDV(Teich): 68.1 ml  ESV(Teich): 26.2 ml  EF(Teich): 61.5 %  LVOT  area: 3.4 cm2    Time Measurements  MM R-R int: 1.1 sec  MM HR: 55.7 BPM    Doppler Measurements \T\ Calculations  MV E max davi: 58.5 cm/sec            MV dec slope: 240.4 cm/sec2  MV A max davi: 95.6 cm/sec            MV dec time: 0.25 sec  MV E/A: 0.61  Ao V2 max: 185.6 cm/sec              LV V1 max P.2 mmHg  Ao max P.8 mmHg                 LV V1 mean P.4 mmHg  TERESA(V,D): 2.1 cm2                    LV V1 max: 113.5 cm/sec                                       LV V1 mean: 71.5 cm/sec                                       LV V1 VTI: 25.0 cm  SV(LVOT): 85.4 ml                    PI end-d davi: 73.7 cm/sec  TR max davi: 149.5 cm/sec             Pulm Sys Davi: 65.8 cm/sec  TR max P.0 mmHg                  Pulm Nunez Davi: 35.3 cm/sec                                       Pulm A Revs Davi: 29.5 cm/sec                                       Pulm A Revs Dur: 0.10 sec                                       Pulm S/D: 1.9    LEFT VENTRICLE         o The left ventricle is normal in size.     o Ejection Fraction = 60-65%.     o Left ventricular systolic function is normal.     o There is mild concentric left ventricular hypertrophy.     o The left ventricular wall motion is normal.      DIASTOLOGY       o Lateral e'= '6' cm/s.     o Septal e'= '6' cm/s.     o E/e' ='9'.     o LAEDV index = '31.79' ml/m2.    RIGHT VENTRICLE       o The right ventricle is normal in size and function.     o The right ventricular systolic function is normal.      ATRIA       o The left atrial size is normal.     o Right atrial size is normal.    MITRAL VALVE       o There is mild mitral annular calcification.     o Mitral valve leaflets appear to be mildly thickened.     o There is no mitral regurgitation noted.     o There is no mitral valve stenosis.    TRICUSPID VALVE         o The tricuspid valve is normal in structure and function.     o There is mild tricuspid regurgitation.     o There is no tricuspid stenosis.    AORTIC VALVE        o Aortic Valve calcified.     o The aortic valve is trileaflet.     o No aortic regurgitation is present.     o AV MAX PG 13.8 mmHg.     o There is no aortic valvular stenosis.    PULMONIC VALVE       o The pulmonic valve is not well seen, but is grossly normal.     o Mild pulmonic valvular regurgitation.     o There is no pulmonic valvular stenosis.    GREAT VESSELS         o The aortic root is normal size.     o Descending aorta not well visualized.    PERICARDIUM/PLEURAL EFFUSION       o There is no pericardial effusion.     o There is no pleural effusion.      _______________________________________________________________________________    Electronically signed by: Desean Valero MD 06/01/2019 08:10 AM  InterpretingPhysician:  Desean Valero MD electronically signed on 2019-06-01 08:10:04.787  ===============================  Interpretation Sinus bradycardia  Nonspecific ST and T wave abnormality  Abnormal ECG    NO PREVIOUS TRACING     PHYSICIAN  Dr. LOR KIDD M.D.     Heart Rate 55 /min   RR Interval 1,090 ms   P Duration 102 ms   QRS Duration 90 ms   CA Interval 164 ms   QT Interval 464 ms   ECG QTC INTERVAL 455 ms   Q-T DISPERSION   ms   P Axis 43 deg   QRS Axis 23 deg   T Axis 68      ==============================================  REASON FOR EXAM: LUE weakness     TECHNICAL FACTORS: Using a linear high-frequency vascular ultrasound transducer, transverse and longitudinal gray-scale images are obtained of the extracranial carotid system. B-mode, color Doppler and spectral Doppler images of the CCA, ICA, ECA, and   vertebral arteries are included. Technical quality of exam is adequate.    TECHNOLOGIST: Emiliana Alvarado    COMPARISON: None    RIGHT CAROTID FINDINGS:  There is mild atheromatous plaquing. The Doppler findings are normal.  ICA Prox PS: 111.36 cm/s   ICA Prox ED: 42.80 cm/s   ICA Mid PS: 44.01 cm/s   ICA Mid ED: 16.92 cm/s   ICA Dist PS: 43.92 cm/s   ICA Dist ED: 17.54 cm/s   CCA  Mid PS: 83.43 cm/s   CCA Mid ED: 16.09 cm/s   ECA PS: 227.18 cm/s  Vert PS: 55.96 cm/s  Antegrade flow.   ICA/CCA PS Ratio: 1.3     LEFT CAROTID FINDINGS:  There is mild atheromatous plaquing. The Doppler findings are normal.  ICA Prox PS: 89.88 cm/s   ICA Prox ED: 38.63 cm/s   ICA Mid PS: 108.84 cm/s   ICA Mid ED: 40.82 cm/s   ICA Dist PS: 107.37 cm/s   ICA Dist ED: 51.17 cm/s   CCA Mid PS: 104.48 cm/s   CCA Mid ED: 27.94 cm/s   ECA PS: 128.52 cm/s  Vert PS: 39.78 cm/s  Antegrade flow.   ICA/CCA PS Ratio: 1.0     Comparison of the proximal internal carotid artery diameter to the distal internal carotid artery diameter bilaterally was performed.     IMPRESSION:  Bilateral carotid atheromatous plaquing. There is less than 50% diameter reduction.    Electronically signed by Grover Stallings MD on 5/31/2019 4:23 PM         Current Assessment & Plan     Unknown to patient.  New heart murmur on exam.  Patient advised to follow up with Cardiology.  May need repeat echo.                Past Medical History:  Past Medical History:   Diagnosis Date    Hyperlipidemia     Hypertension     Stroke      History reviewed. No pertinent surgical history.  Review of patient's allergies indicates:   Allergen Reactions    Lisinopril      Medication List with Changes/Refills   New Medications    CHLORTHALIDONE (HYGROTEN) 25 MG TAB    Take 1 tablet (25 mg total) by mouth once daily.    GABAPENTIN (NEURONTIN) 300 MG CAPSULE    Take 1 capsule (300 mg total) by mouth 3 (three) times daily.    SITAGLIPTIN (JANUVIA) 50 MG TAB    Take 1 tablet (50 mg total) by mouth once daily.   Current Medications    POTASSIUM CHLORIDE SA (K-DUR,KLOR-CON) 20 MEQ TABLET    Take 1 tablet (20 mEq total) by mouth daily   Changed and/or Refilled Medications    Modified Medication Previous Medication    AMLODIPINE (NORVASC) 10 MG TABLET amLODIPine (NORVASC) 10 MG tablet       Take 1 tablet (10 mg total) by mouth daily    Take 1 tablet (10 mg total) by mouth  daily    ASPIRIN (ECOTRIN) 81 MG EC TABLET aspirin (ECOTRIN) 81 MG EC tablet       Chew 1 tablet (81 mg total) by mouth daily    Chew 1 tablet (81 mg total) by mouth daily    ATORVASTATIN (LIPITOR) 40 MG TABLET atorvastatin (LIPITOR) 40 MG tablet       Take 1 tablet (40 mg total) by mouth daily    Take 1 tablet (40 mg total) by mouth daily    FAMOTIDINE (PEPCID) 20 MG TABLET famotidine (PEPCID) 20 MG tablet       Take 1 tablet (20 mg total) by mouth 2 (two) times daily    Take 1 tablet (20 mg total) by mouth 2 (two) times daily   Discontinued Medications    HYDRALAZINE (APRESOLINE) 25 MG TABLET    Take 1 tablet (25 mg total) by mouth 3 (three) times daily    METFORMIN (GLUCOPHAGE) 500 MG TABLET    Take 1 tablet (500 mg total) by mouth once daily.      Social History     Tobacco Use    Smoking status: Never Smoker   Substance Use Topics    Alcohol use: Not on file      Family History   Problem Relation Age of Onset    Cancer Mother         throat    Heart disease Father     Heart disease Brother     Cancer Brother        I have reviewed the complete PMH, social history, surgical history, allergies and medications.  As well as family history.    Review of Systems   Constitutional: Negative for chills, fatigue, fever and unexpected weight change.   HENT: Negative for ear pain and sore throat.    Eyes: Negative for redness and visual disturbance.   Respiratory: Negative for cough and shortness of breath.    Cardiovascular: Negative for chest pain and palpitations.   Gastrointestinal: Negative for nausea and vomiting.   Endocrine: Negative for cold intolerance and heat intolerance.   Genitourinary: Negative for difficulty urinating and hematuria.   Musculoskeletal: Negative for arthralgias and myalgias.   Skin: Negative for rash and wound.   Allergic/Immunologic: Negative for environmental allergies and food allergies.   Neurological: Positive for weakness (Still with left upper and lower extremity weakness.  Worse  "in the left hand.). Negative for headaches.        + neuropathy   Hematological: Negative for adenopathy. Does not bruise/bleed easily.   Psychiatric/Behavioral: Negative for sleep disturbance. The patient is not nervous/anxious.        Objective:     /81   Pulse 76   Temp 97.9 °F (36.6 °C) (Oral)   Ht 5' 5" (1.651 m)   Wt 67.8 kg (149 lb 6.4 oz)   BMI 24.86 kg/m²     Physical Exam   Constitutional: He is oriented to person, place, and time. He appears well-developed and well-nourished. No distress.   HENT:   Head: Normocephalic and atraumatic.   Eyes: Pupils are equal, round, and reactive to light. EOM are normal.   Neck: Normal range of motion. Neck supple.   Cardiovascular: Normal rate, regular rhythm and intact distal pulses.   Murmur heard.  Pulmonary/Chest: Effort normal and breath sounds normal. No respiratory distress. He has no wheezes.   Abdominal: Soft. Bowel sounds are normal. He exhibits no distension.   Musculoskeletal: Normal range of motion. He exhibits no edema.        Right foot: There is normal range of motion and no deformity.        Left foot: There is normal range of motion and no deformity.   Feet:   Right Foot:   Skin Integrity: Positive for callus and dry skin. Negative for ulcer, blister, skin breakdown, erythema or warmth.   Left Foot:   Skin Integrity: Positive for callus and dry skin. Negative for ulcer, blister, skin breakdown, erythema or warmth.   Neurological: He is alert and oriented to person, place, and time. No cranial nerve deficit or sensory deficit. He exhibits normal muscle tone. He displays no seizure activity. Coordination and gait normal.   Left upper extremity weakness greater than right.  Bilateral lower extremity slightly weak.  Strength is weakness in the left /hand.    No slurring of speech.   Skin: Skin is warm and dry. Capillary refill takes less than 2 seconds.   Psychiatric: He has a normal mood and affect. His behavior is normal.   Nursing note and " "vitals reviewed.      Assessment:     1. Encounter for long-term (current) use of medications    2. Hemiparesis of left nondominant side as late effect of cerebral infarction    3. Cerebrovascular accident (CVA) due to thrombosis of right vertebral artery    4. Neuropathy due to medical condition    5. Prediabetes    6. Essential hypertension    7. Hyperlipidemia, unspecified hyperlipidemia type    8. Screening PSA (prostate specific antigen)    9. Colon cancer screening    10. Need for influenza vaccination    11. Heart murmur        Plan:     I have Reviewed and summarized old records.  I have performed thorough medication reconciliation today and discussed risk and benefits of each medication.  I have reviewed labs and discussed with patient.  All questions were answered.  I am requesting old records and will review them once they are available.    Problem List Items Addressed This Visit     Cerebrovascular accident (CVA)     Update October 2019:  Blood pressure is controlled today on hydralazine and amlodipine.  Patient wants to stop hydralazine due to intermittent compliance from 3 times a day.  Patient reports that he cannot take lisinopril due to causing his stroke " and giving him a cough.  I will changes hydralazine to chlorthalidone and recheck his blood pressure in two weeks.  Patient will follow up with me in four weeks.  Patient will notify me blood pressure is not controlled at home.  Patient reports that his wife takes his blood pressure.    Prediabetes:  Patient is due for hemoglobin A1c.  Patient cannot take metformin due to GI side effects.  Switching patient to Januvia.    Patient remains on statin and aspirin.  Checking lipid panel.    ======================================================  Initial plan:  Reviewed records from previous hospitalization.  Continue physical therapy.  Continue medications for blood pressure as well as aspirin statin and checking diabetes status.         Relevant " Medications    aspirin (ECOTRIN) 81 MG EC tablet    atorvastatin (LIPITOR) 40 MG tablet    Other Relevant Orders    Ambulatory referral to Cardiology    NURSING COMMUNICATION: Create [employer] Account    Hypertension Digital Medicine (HDMP) Enrollment Order (Completed)    Hypertension Digital Medicine (HDMP): Assign Onboarding Questionnaires (Completed)    Hyperlipemia     Discussed hyperlipidemia disease course.  Discussed the risk of cardiovascular disease, increase stroke and heart attack risk.  Patient voiced understanding and understood the treatment plan. All questions were answered.  Discussed healthy diet and increased need for exercise.           Relevant Medications    atorvastatin (LIPITOR) 40 MG tablet    Other Relevant Orders    Ambulatory referral to Cardiology    Hypertension     Stop hydralazine.  Start chlorthalidone.  Close blood pressure follow-up in two weeks with nurse visit and follow up with me in four weeks.  Avoid lisinopril due to patient's saying he has contraindication.    Reviewed kidney function.  Patient is not establish with cardiologist.  Patient does have associated heart murmur.    Discussed hypertension disease course.  Discussed-DASH-diet and exercise.  Discussed medication regimen importance of treating high blood pressure.  Patient understood and advised of risk of untreated blood pressure.  ER precautions were given for symptoms of hypertensive urgency and emergency.           Relevant Medications    amLODIPine (NORVASC) 10 MG tablet    chlorthalidone (HYGROTEN) 25 MG Tab    Other Relevant Orders    MICROALBUMIN / CREATININE RATIO URINE    CBC auto differential    Comprehensive metabolic panel    TSH    Ambulatory referral to Cardiology    NURSING COMMUNICATION: Create [employer] Account    Hypertension Digital Medicine (HDMP) Enrollment Order (Completed)    Hypertension Digital Medicine (HDMP): Assign Onboarding Questionnaires (Completed)    Colon cancer screening     Patient  strongly advised to follow up with Dr. reid for repeat colonoscopy.  We also need record of his colon cancer screening from [facility].    Patient requesting see Dr. reid at [facility].           Prediabetes     Rescheduled eye appt.  Stop metformin due to side effects.  Start Januvia.    Monitor hemoglobin A1c.  Discussed diabetic diet and exercise protocol.    Monitor for side effects.  Discussed checking blood glucose.  Discussed symptoms to monitor for and to notify me immediately if persistent or worsening.  Follow up with Ophthalmology/Optometry and Podiatry.           Relevant Medications    SITagliptin (JANUVIA) 50 MG Tab    Other Relevant Orders    MICROALBUMIN / CREATININE RATIO URINE    Hemoglobin A1c    Encounter for long-term (current) use of medications - Primary     Reviewed labs.  Patient is prediabetic and was recently started on new medications.  Repeat A1c.  See diabetes.  Complete history and physical was completed today.  Complete and thorough medication reconciliation was performed.  Discussed risks and benefits of medications.  Advised patient on orders and health maintenance.  We discussed old records and old labs if available.  Will request any records not available through epic.  Continue current medications listed on your summary sheet.           Relevant Medications    amLODIPine (NORVASC) 10 MG tablet    aspirin (ECOTRIN) 81 MG EC tablet    atorvastatin (LIPITOR) 40 MG tablet    famotidine (PEPCID) 20 MG tablet    chlorthalidone (HYGROTEN) 25 MG Tab    Other Relevant Orders    CBC auto differential    Comprehensive metabolic panel    TSH    Screening PSA (prostate specific antigen)    Relevant Orders    PSA, Screening    Need for influenza vaccination    Relevant Orders    Influenza - High Dose (65+) (PF) (IM) (Completed)    Neuropathy due to medical condition     Start gabapentin.  Patient advised to try this medication at night 1st and then increase as tolerated.  Risk and benefits  were discussed.         Relevant Medications    gabapentin (NEURONTIN) 300 MG capsule    Hemiparesis of left nondominant side as late effect of cerebral infarction     Continue home exercise program provided by physical therapy.    ER precautions if symptoms or signs get worse or change.         Heart murmur     Unknown to patient.  New heart murmur on exam.  Patient advised to follow up with Cardiology.  May need repeat echo.         Relevant Orders    Ambulatory referral to Cardiology          Future Appointments     Date Provider Specialty Appt Notes    10/28/2019 Sergio Rivero Jr., MD Cardiology Specialty Services Required    11/15/2019 Landon Smith MD Family Medicine 4 week f/u htn    1/17/2020  Lab     1/24/2020 Landon Smith MD Family Medicine 3 mo f/u htn           Follow up in about 3 months (around 1/18/2020), or if symptoms worsen or fail to improve, for DM, HTN, LAB RESULTS.    If no improvement in symptoms or symptoms worsen, advised to call/follow-up at clinic or go to ER. Patient voiced understanding and all questions/concerns were addressed.     DISCLAIMER: This note was compiled by using a speech recognition dictation system and therefore please be aware that typographical / speech recognition errors can and do occur.  Please contact me if you see any errors specifically.    Landon Smith MD  We Offer Telehealth & Same Day Appointments!   Book your Telehealth appointment with me through my nurse or   Clinic appointments on [employer]!    Office: [phone]          Check out my Facebook Page and Follow Me at: CLICK HERE    Check out my website at [employer] by clicking on: CLICK HERE    To Schedule appointments online, go to [employer]: CLICK HERE     Location: [email or web address]    [address] [place] [address]    FAX: [phone]

## 2019-10-18 NOTE — ASSESSMENT & PLAN NOTE
Rescheduled eye appt.  Stop metformin due to side effects.  Start Januvia.    Monitor hemoglobin A1c.  Discussed diabetic diet and exercise protocol.    Monitor for side effects.  Discussed checking blood glucose.  Discussed symptoms to monitor for and to notify me immediately if persistent or worsening.  Follow up with Ophthalmology/Optometry and Podiatry.

## 2019-10-18 NOTE — ASSESSMENT & PLAN NOTE
Discussed hyperlipidemia disease course.  Discussed the risk of cardiovascular disease, increase stroke and heart attack risk.  Patient voiced understanding and understood the treatment plan. All questions were answered.  Discussed healthy diet and increased need for exercise.

## 2019-10-18 NOTE — ASSESSMENT & PLAN NOTE
Start gabapentin.  Patient advised to try this medication at night 1st and then increase as tolerated.  Risk and benefits were discussed.

## 2019-10-18 NOTE — ASSESSMENT & PLAN NOTE
Stop hydralazine.  Start chlorthalidone.  Close blood pressure follow-up in two weeks with nurse visit and follow up with me in four weeks.  Avoid lisinopril due to patient's saying he has contraindication.    Reviewed kidney function.  Patient is not establish with cardiologist.  Patient does have associated heart murmur.    Discussed hypertension disease course.  Discussed-DASH-diet and exercise.  Discussed medication regimen importance of treating high blood pressure.  Patient understood and advised of risk of untreated blood pressure.  ER precautions were given for symptoms of hypertensive urgency and emergency.

## 2019-10-18 NOTE — ASSESSMENT & PLAN NOTE
Unknown to patient.  New heart murmur on exam.  Patient advised to follow up with Cardiology.  May need repeat echo.

## 2019-10-18 NOTE — ASSESSMENT & PLAN NOTE
Reviewed labs.  Patient is prediabetic and was recently started on new medications.  Repeat A1c.  See diabetes.  Complete history and physical was completed today.  Complete and thorough medication reconciliation was performed.  Discussed risks and benefits of medications.  Advised patient on orders and health maintenance.  We discussed old records and old labs if available.  Will request any records not available through epic.  Continue current medications listed on your summary sheet.

## 2019-11-08 ENCOUNTER — PATIENT OUTREACH (OUTPATIENT)
Dept: ADMINISTRATIVE | Facility: HOSPITAL | Age: 69
End: 2019-11-08

## 2019-12-04 ENCOUNTER — OFFICE VISIT (OUTPATIENT)
Dept: FAMILY MEDICINE | Facility: CLINIC | Age: 69
End: 2019-12-04
Payer: MEDICARE

## 2019-12-04 ENCOUNTER — LAB VISIT (OUTPATIENT)
Dept: LAB | Facility: HOSPITAL | Age: 69
End: 2019-12-04
Attending: FAMILY MEDICINE
Payer: MEDICARE

## 2019-12-04 VITALS
HEART RATE: 79 BPM | HEIGHT: 65 IN | DIASTOLIC BLOOD PRESSURE: 76 MMHG | BODY MASS INDEX: 24.72 KG/M2 | SYSTOLIC BLOOD PRESSURE: 126 MMHG | TEMPERATURE: 98 F | WEIGHT: 148.38 LBS

## 2019-12-04 DIAGNOSIS — E78.2 MIXED HYPERLIPIDEMIA: Chronic | ICD-10-CM

## 2019-12-04 DIAGNOSIS — R73.03 PREDIABETES: ICD-10-CM

## 2019-12-04 DIAGNOSIS — I10 ESSENTIAL HYPERTENSION: ICD-10-CM

## 2019-12-04 DIAGNOSIS — Z12.5 SCREENING PSA (PROSTATE SPECIFIC ANTIGEN): ICD-10-CM

## 2019-12-04 DIAGNOSIS — I10 ESSENTIAL HYPERTENSION: Chronic | ICD-10-CM

## 2019-12-04 DIAGNOSIS — R73.03 PREDIABETES: Primary | Chronic | ICD-10-CM

## 2019-12-04 DIAGNOSIS — Z79.899 ENCOUNTER FOR LONG-TERM (CURRENT) USE OF MEDICATIONS: ICD-10-CM

## 2019-12-04 DIAGNOSIS — Z79.899 ENCOUNTER FOR LONG-TERM (CURRENT) USE OF MEDICATIONS: Chronic | ICD-10-CM

## 2019-12-04 LAB
ALBUMIN SERPL BCP-MCNC: 3.7 G/DL (ref 3.5–5.2)
ALP SERPL-CCNC: 91 U/L (ref 55–135)
ALT SERPL W/O P-5'-P-CCNC: 8 U/L (ref 10–44)
ANION GAP SERPL CALC-SCNC: 9 MMOL/L (ref 8–16)
AST SERPL-CCNC: 13 U/L (ref 10–40)
BASOPHILS # BLD AUTO: 0.08 K/UL (ref 0–0.2)
BASOPHILS NFR BLD: 1.2 % (ref 0–1.9)
BILIRUB SERPL-MCNC: 0.4 MG/DL (ref 0.1–1)
BUN SERPL-MCNC: 18 MG/DL (ref 8–23)
CALCIUM SERPL-MCNC: 9.7 MG/DL (ref 8.7–10.5)
CHLORIDE SERPL-SCNC: 103 MMOL/L (ref 95–110)
CHOLEST SERPL-MCNC: 153 MG/DL (ref 120–199)
CHOLEST/HDLC SERPL: 3.6 {RATIO} (ref 2–5)
CO2 SERPL-SCNC: 29 MMOL/L (ref 23–29)
COMPLEXED PSA SERPL-MCNC: 0.78 NG/ML (ref 0–4)
CREAT SERPL-MCNC: 1.3 MG/DL (ref 0.5–1.4)
DIFFERENTIAL METHOD: ABNORMAL
EOSINOPHIL # BLD AUTO: 0.7 K/UL (ref 0–0.5)
EOSINOPHIL NFR BLD: 10.1 % (ref 0–8)
ERYTHROCYTE [DISTWIDTH] IN BLOOD BY AUTOMATED COUNT: 12.4 % (ref 11.5–14.5)
EST. GFR  (AFRICAN AMERICAN): >60 ML/MIN/1.73 M^2
EST. GFR  (NON AFRICAN AMERICAN): 55.7 ML/MIN/1.73 M^2
ESTIMATED AVG GLUCOSE: 123 MG/DL (ref 68–131)
GLUCOSE SERPL-MCNC: 104 MG/DL (ref 70–110)
HBA1C MFR BLD HPLC: 5.9 % (ref 4–5.6)
HCT VFR BLD AUTO: 45.7 % (ref 40–54)
HDLC SERPL-MCNC: 42 MG/DL (ref 40–75)
HDLC SERPL: 27.5 % (ref 20–50)
HGB BLD-MCNC: 14.5 G/DL (ref 14–18)
IMM GRANULOCYTES # BLD AUTO: 0.02 K/UL (ref 0–0.04)
IMM GRANULOCYTES NFR BLD AUTO: 0.3 % (ref 0–0.5)
LDLC SERPL CALC-MCNC: 97.4 MG/DL (ref 63–159)
LYMPHOCYTES # BLD AUTO: 1.4 K/UL (ref 1–4.8)
LYMPHOCYTES NFR BLD: 21.8 % (ref 18–48)
MCH RBC QN AUTO: 28.2 PG (ref 27–31)
MCHC RBC AUTO-ENTMCNC: 31.7 G/DL (ref 32–36)
MCV RBC AUTO: 89 FL (ref 82–98)
MONOCYTES # BLD AUTO: 0.4 K/UL (ref 0.3–1)
MONOCYTES NFR BLD: 5.6 % (ref 4–15)
NEUTROPHILS # BLD AUTO: 3.9 K/UL (ref 1.8–7.7)
NEUTROPHILS NFR BLD: 61 % (ref 38–73)
NONHDLC SERPL-MCNC: 111 MG/DL
NRBC BLD-RTO: 0 /100 WBC
PLATELET # BLD AUTO: 308 K/UL (ref 150–350)
PMV BLD AUTO: 10.7 FL (ref 9.2–12.9)
POTASSIUM SERPL-SCNC: 3.6 MMOL/L (ref 3.5–5.1)
PROT SERPL-MCNC: 8 G/DL (ref 6–8.4)
RBC # BLD AUTO: 5.15 M/UL (ref 4.6–6.2)
SODIUM SERPL-SCNC: 141 MMOL/L (ref 136–145)
TRIGL SERPL-MCNC: 68 MG/DL (ref 30–150)
TSH SERPL DL<=0.005 MIU/L-ACNC: 1.24 UIU/ML (ref 0.4–4)
WBC # BLD AUTO: 6.41 K/UL (ref 3.9–12.7)

## 2019-12-04 PROCEDURE — 36415 COLL VENOUS BLD VENIPUNCTURE: CPT | Mod: PO

## 2019-12-04 PROCEDURE — 1125F PR PAIN SEVERITY QUANTIFIED, PAIN PRESENT: ICD-10-PCS | Mod: S$GLB,,, | Performed by: FAMILY MEDICINE

## 2019-12-04 PROCEDURE — 84443 ASSAY THYROID STIM HORMONE: CPT

## 2019-12-04 PROCEDURE — 1159F MED LIST DOCD IN RCRD: CPT | Mod: S$GLB,,, | Performed by: FAMILY MEDICINE

## 2019-12-04 PROCEDURE — 80061 LIPID PANEL: CPT

## 2019-12-04 PROCEDURE — 99999 PR PBB SHADOW E&M-EST. PATIENT-LVL III: ICD-10-PCS | Mod: PBBFAC,,, | Performed by: FAMILY MEDICINE

## 2019-12-04 PROCEDURE — 85025 COMPLETE CBC W/AUTO DIFF WBC: CPT

## 2019-12-04 PROCEDURE — 1101F PR PT FALLS ASSESS DOC 0-1 FALLS W/OUT INJ PAST YR: ICD-10-PCS | Mod: S$GLB,,, | Performed by: FAMILY MEDICINE

## 2019-12-04 PROCEDURE — 99214 PR OFFICE/OUTPT VISIT, EST, LEVL IV, 30-39 MIN: ICD-10-PCS | Mod: S$GLB,,, | Performed by: FAMILY MEDICINE

## 2019-12-04 PROCEDURE — 1101F PT FALLS ASSESS-DOCD LE1/YR: CPT | Mod: S$GLB,,, | Performed by: FAMILY MEDICINE

## 2019-12-04 PROCEDURE — 80053 COMPREHEN METABOLIC PANEL: CPT

## 2019-12-04 PROCEDURE — 83036 HEMOGLOBIN GLYCOSYLATED A1C: CPT

## 2019-12-04 PROCEDURE — 99214 OFFICE O/P EST MOD 30 MIN: CPT | Mod: S$GLB,,, | Performed by: FAMILY MEDICINE

## 2019-12-04 PROCEDURE — 1159F PR MEDICATION LIST DOCUMENTED IN MEDICAL RECORD: ICD-10-PCS | Mod: S$GLB,,, | Performed by: FAMILY MEDICINE

## 2019-12-04 PROCEDURE — 1125F AMNT PAIN NOTED PAIN PRSNT: CPT | Mod: S$GLB,,, | Performed by: FAMILY MEDICINE

## 2019-12-04 PROCEDURE — 84153 ASSAY OF PSA TOTAL: CPT

## 2019-12-04 PROCEDURE — 99999 PR PBB SHADOW E&M-EST. PATIENT-LVL III: CPT | Mod: PBBFAC,,, | Performed by: FAMILY MEDICINE

## 2019-12-04 RX ORDER — METFORMIN HYDROCHLORIDE 500 MG/1
500 TABLET ORAL NIGHTLY
Qty: 90 TABLET | Refills: 1 | Status: SHIPPED | OUTPATIENT
Start: 2019-12-04 | End: 2020-01-08 | Stop reason: SDUPTHER

## 2019-12-04 RX ORDER — ASPIRIN 81 MG/1
TABLET ORAL
Qty: 90 TABLET | Refills: 3 | Status: SHIPPED | OUTPATIENT
Start: 2019-12-04 | End: 2020-01-08 | Stop reason: SDUPTHER

## 2019-12-04 RX ORDER — AMLODIPINE BESYLATE 10 MG/1
TABLET ORAL
Qty: 90 TABLET | Refills: 1 | Status: SHIPPED | OUTPATIENT
Start: 2019-12-04 | End: 2020-01-08 | Stop reason: SDUPTHER

## 2019-12-04 RX ORDER — METFORMIN HYDROCHLORIDE 500 MG/1
500 TABLET ORAL NIGHTLY
COMMUNITY
End: 2019-12-04 | Stop reason: SDUPTHER

## 2019-12-04 RX ORDER — CHLORTHALIDONE 25 MG/1
25 TABLET ORAL DAILY
Qty: 90 TABLET | Refills: 1 | Status: SHIPPED | OUTPATIENT
Start: 2019-12-04 | End: 2020-01-08 | Stop reason: SDUPTHER

## 2019-12-04 RX ORDER — ATORVASTATIN CALCIUM 40 MG/1
TABLET, FILM COATED ORAL
Qty: 90 TABLET | Refills: 1 | Status: SHIPPED | OUTPATIENT
Start: 2019-12-04 | End: 2020-01-08 | Stop reason: SDUPTHER

## 2019-12-04 NOTE — ASSESSMENT & PLAN NOTE
Continue metformin 500 mg at night.  Titrate dose according to A1c today.  Checking fasting lipid panel today to see if LDL is at goal.  Other milestones were met.  Follow-up in three months.Monitor hemoglobin A1c.  Discussed diabetic diet and exercise protocol.  Continue medications.  Monitor for side effects.  Discussed checking blood glucose.  Discussed symptoms to monitor for and to notify me immediately if persistent or worsening.  Follow up with Ophthalmology/Optometry and Podiatry.

## 2019-12-04 NOTE — ASSESSMENT & PLAN NOTE
December 2019:  Updating labs today.  Patient is on metformin at night.  Stop Januvia recently.  Refills as needed.  Follow-up in three months  Reviewed labs.  Patient is prediabetic and was recently started on new medications.  Repeat A1c.  See diabetes.  Complete history and physical was completed today.  Complete and thorough medication reconciliation was performed.  Discussed risks and benefits of medications.  Advised patient on orders and health maintenance.  We discussed old records and old labs if available.  Will request any records not available through epic.  Continue current medications listed on your summary sheet.

## 2019-12-04 NOTE — ASSESSMENT & PLAN NOTE
Repeating lipid panel fasting today.  Continue atorvastatin.  Discussed hyperlipidemia disease course.  Discussed the risk of cardiovascular disease, increase stroke and heart attack risk.  Patient voiced understanding and understood the treatment plan. All questions were answered.  Discussed healthy diet and increased need for exercise.

## 2019-12-04 NOTE — PATIENT INSTRUCTIONS
Follow up in about 3 months (around 3/4/2020), or if symptoms worsen or fail to improve, for HTN, DM, LAB RESULTS, Med refills, HLD.     If no improvement in symptoms or symptoms worsen, please be advised to call MD, follow-up at clinic and/or go to ER if becomes severe.    Landon Smith M.D.         We Offer TELEHEATLH & Same Day Appointments!   Book your Telehealth appointment with me through my nurse or   Clinic appointments on [facility]!    [address] [place] [address]    Office: [phone]     FAX: [phone]    Check out my Facebook Page and Follow Me at: CLICK HERE    Check out my website at [employer] by clicking on: CLICK HERE    To Schedule appointments online, go to [facility]: CLICK HERE     Location: [email or web address]

## 2019-12-04 NOTE — ASSESSMENT & PLAN NOTE
December 2019:  Patient blood pressure well controlled on amlodipine and chlorthalidone.  Refills as needed.  Follow-up with Cardiology for murmur.    Previous plan:  Stop hydralazine.  Start chlorthalidone.  Close blood pressure follow-up in two weeks with nurse visit and follow up with me in four weeks.  Avoid lisinopril due to patient's saying he has contraindication.    Reviewed kidney function.  Patient is not establish with cardiologist.  Patient does have associated heart murmur.    Discussed hypertension disease course.  Discussed-DASH-diet and exercise.  Discussed medication regimen importance of treating high blood pressure.  Patient understood and advised of risk of untreated blood pressure.  ER precautions were given for symptoms of hypertensive urgency and emergency.

## 2019-12-04 NOTE — PROGRESS NOTES
PLAN:      Problem List Items Addressed This Visit     Hyperlipemia (Chronic)     Repeating lipid panel fasting today.  Continue atorvastatin.  Discussed hyperlipidemia disease course.  Discussed the risk of cardiovascular disease, increase stroke and heart attack risk.  Patient voiced understanding and understood the treatment plan. All questions were answered.  Discussed healthy diet and increased need for exercise.           Relevant Medications    atorvastatin (LIPITOR) 40 MG tablet    Other Relevant Orders    Lipid panel    Hypertension (Chronic)     December 2019:  Patient blood pressure well controlled on amlodipine and chlorthalidone.  Refills as needed.  Follow-up with Cardiology for murmur.    Previous plan:  Stop hydralazine.  Start chlorthalidone.  Close blood pressure follow-up in two weeks with nurse visit and follow up with me in four weeks.  Avoid lisinopril due to patient's saying he has contraindication.    Reviewed kidney function.  Patient is not establish with cardiologist.  Patient does have associated heart murmur.    Discussed hypertension disease course.  Discussed-DASH-diet and exercise.  Discussed medication regimen importance of treating high blood pressure.  Patient understood and advised of risk of untreated blood pressure.  ER precautions were given for symptoms of hypertensive urgency and emergency.           Relevant Medications    amLODIPine (NORVASC) 10 MG tablet    chlorthalidone (HYGROTEN) 25 MG Tab    Other Relevant Orders    Comprehensive metabolic panel    CBC Without Differential    Prediabetes - Primary (Chronic)     Continue metformin 500 mg at night.  Titrate dose according to A1c today.  Checking fasting lipid panel today to see if LDL is at goal.  Other milestones were met.  Follow-up in three months.Monitor hemoglobin A1c.  Discussed diabetic diet and exercise protocol.  Continue medications.  Monitor for side effects.  Discussed checking blood glucose.  Discussed  symptoms to monitor for and to notify me immediately if persistent or worsening.  Follow up with Ophthalmology/Optometry and Podiatry.           Relevant Medications    metFORMIN (GLUCOPHAGE) 500 MG tablet    Other Relevant Orders    Hemoglobin A1c    Encounter for long-term (current) use of medications (Chronic)     December 2019:  Updating labs today.  Patient is on metformin at night.  Stop Januvia recently.  Refills as needed.  Follow-up in three months  Reviewed labs.  Patient is prediabetic and was recently started on new medications.  Repeat A1c.  See diabetes.  Complete history and physical was completed today.  Complete and thorough medication reconciliation was performed.  Discussed risks and benefits of medications.  Advised patient on orders and health maintenance.  We discussed old records and old labs if available.  Will request any records not available through epic.  Continue current medications listed on your summary sheet.           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    POTASSIUM CHLORIDE SA (K-DUR,KLOR-CON) 20 MEQ TABLET    Take 1 tablet (20 mEq total) by  mouth daily   Changed and/or Refilled Medications    Modified Medication Previous Medication    AMLODIPINE (NORVASC) 10 MG TABLET amLODIPine (NORVASC) 10 MG tablet       Take 1 tablet (10 mg total) by mouth daily    Take 1 tablet (10 mg total) by mouth daily    ASPIRIN (ECOTRIN) 81 MG EC TABLET aspirin (ECOTRIN) 81 MG EC tablet       Chew 1 tablet (81 mg total) by mouth daily    Chew 1 tablet (81 mg total) by mouth daily    ATORVASTATIN (LIPITOR) 40 MG TABLET atorvastatin (LIPITOR) 40 MG tablet       Take 1 tablet (40 mg total) by mouth daily    Take 1 tablet (40 mg total) by mouth daily    CHLORTHALIDONE (HYGROTEN) 25 MG TAB chlorthalidone (HYGROTEN) 25 MG Tab       Take 1 tablet (25 mg total) by mouth once daily.    Take 1 tablet (25 mg total) by mouth once daily.    METFORMIN (GLUCOPHAGE) 500 MG TABLET metFORMIN (GLUCOPHAGE) 500 MG tablet       Take 1 tablet (500 mg total) by mouth every evening.    Take 500 mg by mouth every evening.   Discontinued Medications    SITAGLIPTIN (JANUVIA) 50 MG TAB    Take 1 tablet (50 mg total) by mouth once daily.       Landon Smith M.D.     ==========================================================================  Subjective:      Patient ID: Jose R Coffman is a 69 y.o. male.  has a past medical history of Hyperlipidemia, Hypertension, and Stroke.     Chief Complaint: Follow-up; Hyperlipidemia; Hypertension; and Diabetes      Problem List Items Addressed This Visit     Hyperlipemia (Chronic)    Overview     December 2019:  Patient due for updated lipid panel.  Patient is compliant with atorvastatin.  No side effects reported.  CHRONIC. STABLE. Lab analysis reviewed.   (-) CP, SOB, abdominal pain, N/V/D, constipation, jaundice, skin changes.  (-) Myalgias  Lab Results   Component Value Date    CHOL 187 06/01/2019     Lab Results   Component Value Date    HDL 38 06/01/2019     Lab Results   Component Value Date    LDLCALC 138 06/01/2019     Lab Results   Component Value Date     TRIG 56 06/01/2019     No results found for: CHOLHDL  No results found for: TOTALCHOLEST  No results found for: ALT, AST, GGT, ALKPHOS, BILITOT  ======================================================           Current Assessment & Plan     Repeating lipid panel fasting today.  Continue atorvastatin.  Discussed hyperlipidemia disease course.  Discussed the risk of cardiovascular disease, increase stroke and heart attack risk.  Patient voiced understanding and understood the treatment plan. All questions were answered.  Discussed healthy diet and increased need for exercise.           Hypertension (Chronic)    Overview     CHRONIC. STABLE. BP Reviewed.  Compliant with BP medications. No SE reported.   Patient reports compliance with amlodipine and chlorthalidone daily.  (-) CP, SOB, palpitations, dizziness, lightheadedness, HA, arm numbness, tingling or weakness, syncope.  Creatinine   Date Value Ref Range Status   06/13/2019 1.2 mg/dL Final     ======================================================  Patient reports that he cannot take hydralazine 3 times a day due to intermittent compliance.  Patient reports that lisinopril is contraindicated for him.  Patient says that lisinopril caused his stroke.           Current Assessment & Plan     December 2019:  Patient blood pressure well controlled on amlodipine and chlorthalidone.  Refills as needed.  Follow-up with Cardiology for murmur.    Previous plan:  Stop hydralazine.  Start chlorthalidone.  Close blood pressure follow-up in two weeks with nurse visit and follow up with me in four weeks.  Avoid lisinopril due to patient's saying he has contraindication.    Reviewed kidney function.  Patient is not establish with cardiologist.  Patient does have associated heart murmur.    Discussed hypertension disease course.  Discussed-DASH-diet and exercise.  Discussed medication regimen importance of treating high blood pressure.  Patient understood and advised of risk of  untreated blood pressure.  ER precautions were given for symptoms of hypertensive urgency and emergency.           Prediabetes - Primary (Chronic)    Overview     Initial HPI:  Last hemoglobin A1c was a couple of years ago was prediabetic range.  Patient not currently on any metformin or other diabetic medication.  Recently diagnosed with stroke last month.    Patient not currently seeing eye doctor.  Patient has no issues with his feet currently.  ======================================================  October 2019: Patient reports taking the metformin but had to stop it due to constant diarrhea and nausea.   =======================================================  DECEMBER 2019:  Patient reports that he was unable to continue Januvia due to cost.  Patient is back on metformin 500 mg at night and tolerating it okay.  Diabetes Management Status    Statin: Taking  ACE/ARB: Not taking    Screening or Prevention Patient's value Goal Complete/Controlled?   HgA1C Testing and Control   Lab Results   Component Value Date    HGBA1C 6.1 (H) 07/19/2019      Annually/Less than 8% Yes   Lipid profile : 06/01/2019 Annually Yes   LDL control Lab Results   Component Value Date    LDLCALC 138 06/01/2019    Annually/Less than 100 mg/dl  No   Nephropathy screening Lab Results   Component Value Date    LABMICR 16.0 10/18/2019     No results found for: PROTEINUA Annually Yes   Blood pressure BP Readings from Last 1 Encounters:   12/04/19 126/76    Less than 140/90 Yes   Dilated retinal exam Most Recent Eye Exam Date: Not Found Annually Yes   Foot exam   Most Recent Foot Exam Date: Not Found Annually Yes              Current Assessment & Plan     Continue metformin 500 mg at night.  Titrate dose according to A1c today.  Checking fasting lipid panel today to see if LDL is at goal.  Other milestones were met.  Follow-up in three months.Monitor hemoglobin A1c.  Discussed diabetic diet and exercise protocol.  Continue medications.  Monitor  for side effects.  Discussed checking blood glucose.  Discussed symptoms to monitor for and to notify me immediately if persistent or worsening.  Follow up with Ophthalmology/Optometry and Podiatry.           Encounter for long-term (current) use of medications (Chronic)    Overview     07/19/2019 Patient is on CHRONIC long-term drug therapy for managed conditions. See medication list. Reports compliance.  No side effects reported.  Routine lab work is being monitored.  Patient does not need refills today.   Reviewed blood work from recent hospitalization.  Patient has hyperglycemia without recent A1c.  Patient was prediabetic years ago.  Not currently on metformin.  Creatinine was preserved.  Blood counts were normal.  TSH has been normal in the past.  ======================================================  Patient is on CHRONIC long-term drug therapy for managed conditions. See medication list. Reports compliance.  No side effects reported.  Routine lab work is being monitored.  Patient does need refills today.   =======================================================  DECEMBER 2019:  Plan is to update labs today.  Patient is compliant with medications.  Patient has stop Januvia due to cost.  Back on metformin.  Patient is tolerating at night.  CHRONIC. Stable. Compliant with medications for managed conditions. See medication list. No SE reported.   Routine lab analysis is being monitored. Refills were addressed.  No results found for: WBC, HGB, HCT, MCV, PLT      Chemistry        Component Value Date/Time     06/13/2019    K 4.2 06/13/2019     06/13/2019    CO2 23 06/13/2019    BUN 14 06/13/2019    CREATININE 1.2 06/13/2019        Component Value Date/Time    CALCIUM 8.6 06/13/2019        No results found for: TSH, F7RTAVO, B8XBDTW, THYROIDAB, FREET4, T3FREE  ======================================================         Current Assessment & Plan     December 2019:  Updating labs today.  Patient is on  metformin at night.  Stop Januvia recently.  Refills as needed.  Follow-up in three months  Reviewed labs.  Patient is prediabetic and was recently started on new medications.  Repeat A1c.  See diabetes.  Complete history and physical was completed today.  Complete and thorough medication reconciliation was performed.  Discussed risks and benefits of medications.  Advised patient on orders and health maintenance.  We discussed old records and old labs if available.  Will request any records not available through epic.  Continue current medications listed on your summary sheet.                  Past Medical History:  Past Medical History:   Diagnosis Date    Hyperlipidemia     Hypertension     Stroke      History reviewed. No pertinent surgical history.  Review of patient's allergies indicates:   Allergen Reactions    Lisinopril      Medication List with Changes/Refills   Current Medications    FAMOTIDINE (PEPCID) 20 MG TABLET    Take 1 tablet (20 mg total) by mouth 2 (two) times daily    GABAPENTIN (NEURONTIN) 300 MG CAPSULE    Take 1 capsule (300 mg total) by mouth 3 (three) times daily.    POTASSIUM CHLORIDE SA (K-DUR,KLOR-CON) 20 MEQ TABLET    Take 1 tablet (20 mEq total) by mouth daily   Changed and/or Refilled Medications    Modified Medication Previous Medication    AMLODIPINE (NORVASC) 10 MG TABLET amLODIPine (NORVASC) 10 MG tablet       Take 1 tablet (10 mg total) by mouth daily    Take 1 tablet (10 mg total) by mouth daily    ASPIRIN (ECOTRIN) 81 MG EC TABLET aspirin (ECOTRIN) 81 MG EC tablet       Chew 1 tablet (81 mg total) by mouth daily    Chew 1 tablet (81 mg total) by mouth daily    ATORVASTATIN (LIPITOR) 40 MG TABLET atorvastatin (LIPITOR) 40 MG tablet       Take 1 tablet (40 mg total) by mouth daily    Take 1 tablet (40 mg total) by mouth daily    CHLORTHALIDONE (HYGROTEN) 25 MG TAB chlorthalidone (HYGROTEN) 25 MG Tab       Take 1 tablet (25 mg total) by mouth once daily.    Take 1 tablet (25 mg  "total) by mouth once daily.    METFORMIN (GLUCOPHAGE) 500 MG TABLET metFORMIN (GLUCOPHAGE) 500 MG tablet       Take 1 tablet (500 mg total) by mouth every evening.    Take 500 mg by mouth every evening.   Discontinued Medications    SITAGLIPTIN (JANUVIA) 50 MG TAB    Take 1 tablet (50 mg total) by mouth once daily.      Social History     Tobacco Use    Smoking status: Never Smoker   Substance Use Topics    Alcohol use: Not on file      Family History   Problem Relation Age of Onset    Cancer Mother         throat    Heart disease Father     Heart disease Brother     Cancer Brother        I have reviewed the complete PMH, social history, surgical history, allergies and medications.  As well as family history.    Review of Systems   Constitutional: Negative for chills, fatigue, fever and unexpected weight change.   HENT: Negative for ear pain and sore throat.    Eyes: Negative for redness and visual disturbance.   Respiratory: Negative for cough and shortness of breath.    Cardiovascular: Negative for chest pain and palpitations.   Gastrointestinal: Negative for nausea and vomiting.   Endocrine: Negative for cold intolerance and heat intolerance.   Genitourinary: Negative for difficulty urinating and hematuria.   Musculoskeletal: Negative for arthralgias and myalgias.   Skin: Negative for rash and wound.   Allergic/Immunologic: Negative for environmental allergies and food allergies.   Neurological: Positive for weakness (Still with left upper and lower extremity weakness.  Worse in the left hand.). Negative for headaches.        + neuropathy   Hematological: Negative for adenopathy. Does not bruise/bleed easily.   Psychiatric/Behavioral: Negative for sleep disturbance. The patient is not nervous/anxious.      Objective:   /76   Pulse 79   Temp 98.4 °F (36.9 °C)   Ht 5' 5" (1.651 m)   Wt 67.3 kg (148 lb 6.4 oz)   BMI 24.70 kg/m²   Physical Exam   Constitutional: He is oriented to person, place, and " time. He appears well-developed and well-nourished. No distress.   HENT:   Head: Normocephalic and atraumatic.   Eyes: Pupils are equal, round, and reactive to light. EOM are normal.   Neck: Normal range of motion. Neck supple.   Cardiovascular: Normal rate, regular rhythm and intact distal pulses.   Murmur heard.  Pulmonary/Chest: Effort normal and breath sounds normal. No respiratory distress. He has no wheezes.   Abdominal: Soft. Bowel sounds are normal. He exhibits no distension.   Musculoskeletal: Normal range of motion. He exhibits no edema.        Right foot: There is normal range of motion and no deformity.        Left foot: There is normal range of motion and no deformity.   Feet:   Right Foot:   Skin Integrity: Positive for callus and dry skin. Negative for ulcer, blister, skin breakdown, erythema or warmth.   Left Foot:   Skin Integrity: Positive for callus and dry skin. Negative for ulcer, blister, skin breakdown, erythema or warmth.   Neurological: He is alert and oriented to person, place, and time. No cranial nerve deficit or sensory deficit. He exhibits normal muscle tone. He displays no seizure activity. Coordination and gait normal.   Left upper extremity weakness greater than right.  Bilateral lower extremity slightly weak.  Strength is weakness in the left /hand.    No slurring of speech.   Skin: Skin is warm and dry. Capillary refill takes less than 2 seconds.   Psychiatric: He has a normal mood and affect. His behavior is normal.   Nursing note and vitals reviewed.      Assessment:     1. Prediabetes    2. Encounter for long-term (current) use of medications    3. Mixed hyperlipidemia    4. Essential hypertension      MDM:     I have Reviewed and summarized old records.  I have performed thorough medication reconciliation today and discussed risk and benefits of each medication.  I have reviewed labs and discussed with patient.  All questions were answered.  I am requesting old records and  will review them once they are available.    I have signed for the following orders AND/OR meds.  Orders Placed This Encounter   Procedures    Lipid panel     Standing Status:   Future     Number of Occurrences:   1     Standing Expiration Date:   2/1/2021    Hemoglobin A1c     Standing Status:   Future     Standing Expiration Date:   2/1/2021    Comprehensive metabolic panel     Standing Status:   Future     Standing Expiration Date:   2/1/2021    CBC Without Differential     Standing Status:   Future     Standing Expiration Date:   2/1/2021     Medications Ordered This Encounter   Medications    amLODIPine (NORVASC) 10 MG tablet     Sig: Take 1 tablet (10 mg total) by mouth daily     Dispense:  90 tablet     Refill:  1    aspirin (ECOTRIN) 81 MG EC tablet     Sig: Chew 1 tablet (81 mg total) by mouth daily     Dispense:  90 tablet     Refill:  3    atorvastatin (LIPITOR) 40 MG tablet     Sig: Take 1 tablet (40 mg total) by mouth daily     Dispense:  90 tablet     Refill:  1    chlorthalidone (HYGROTEN) 25 MG Tab     Sig: Take 1 tablet (25 mg total) by mouth once daily.     Dispense:  90 tablet     Refill:  1    metFORMIN (GLUCOPHAGE) 500 MG tablet     Sig: Take 1 tablet (500 mg total) by mouth every evening.     Dispense:  90 tablet     Refill:  1        Follow up in about 3 months (around 3/4/2020), or if symptoms worsen or fail to improve, for HTN, DM, LAB RESULTS, Med refills, HLD.    If no improvement in symptoms or symptoms worsen, advised to call/follow-up at clinic or go to ER. Patient voiced understanding and all questions/concerns were addressed.     DISCLAIMER: This note was compiled by using a speech recognition dictation system and therefore please be aware that typographical / speech recognition errors can and do occur.  Please contact me if you see any errors specifically.    Landon Smith M.D.       Office: [phone] [address] [place] [address]  FAX: [phone]

## 2019-12-05 NOTE — PROGRESS NOTES
CALL THE PATIENT to discuss results and document verification.  Help patient with MyChart activation.    Increase atorvastatin to 80 mg.  Recheck lipid panel and A1c in three months.  Continue metformin for diabetes.  Advised patient to update colonoscopy before the end of the year    Labs are stable.  Normal PSA.  Normal TSH.  Lipid panel shows improvement from previous.  However LDL is still above 70.  With history of stroke we need to get this number below 70.  Increase atorvastatin to 80 mg daily.   A1c is improved from previous.  Continue metformin as discussed.  Recheck lipid panel and A1c in three months.  Blood counts are normal.  Metabolic panel is stable within normal limits.    Also please address any outstanding health maintenance that may be due: TETANUS VACCINE due on 04/04/1968  Colonoscopy due on 04/04/2000  =

## 2019-12-16 ENCOUNTER — TELEPHONE (OUTPATIENT)
Dept: FAMILY MEDICINE | Facility: CLINIC | Age: 69
End: 2019-12-16

## 2019-12-16 NOTE — TELEPHONE ENCOUNTER
----- Message from Landon Smith MD sent at 12/5/2019  7:52 AM CST -----  CALL THE PATIENT to discuss results and document verification.  Help patient with MyChart activation.    Increase atorvastatin to 80 mg.  Recheck lipid panel and A1c in three months.  Continue metformin for diabetes.  Advised patient to update colonoscopy before the end of the year    Labs are stable.  Normal PSA.  Normal TSH.  Lipid panel shows improvement from previous.  However LDL is still above 70.  With history of stroke we need to get this number below 70.  Increase atorvastatin to 80 mg daily.   A1c is improved from previous.  Continue metformin as discussed.  Recheck lipid panel and A1c in three months.  Blood counts are normal.  Metabolic panel is stable within normal limits.    Also please address any outstanding health maintenance that may be due: TETANUS VACCINE due on 04/04/1968  Colonoscopy due on 04/04/2000  =

## 2019-12-16 NOTE — TELEPHONE ENCOUNTER
Called and spoke with patient, patient notified of results after review by Dr. Smith along with Dr. Smith's recommendation to increase atorvastatin to 80 mg daily, patient has atorvastatin 40 mg tablets in his home and was advised to take 2 of the atorvastatin 40 mg to equal 1 atorvastatin 80 mg, patient verbalized understanding of this.

## 2020-01-03 DIAGNOSIS — I10 ESSENTIAL HYPERTENSION: Primary | ICD-10-CM

## 2020-01-08 DIAGNOSIS — R73.03 PREDIABETES: Chronic | ICD-10-CM

## 2020-01-08 DIAGNOSIS — E78.2 MIXED HYPERLIPIDEMIA: Chronic | ICD-10-CM

## 2020-01-08 DIAGNOSIS — Z79.899 ENCOUNTER FOR LONG-TERM (CURRENT) USE OF MEDICATIONS: Chronic | ICD-10-CM

## 2020-01-08 DIAGNOSIS — I10 ESSENTIAL HYPERTENSION: Chronic | ICD-10-CM

## 2020-01-08 DIAGNOSIS — G63 NEUROPATHY DUE TO MEDICAL CONDITION: ICD-10-CM

## 2020-01-08 RX ORDER — CHLORTHALIDONE 25 MG/1
25 TABLET ORAL DAILY
Qty: 90 TABLET | Refills: 1 | Status: SHIPPED | OUTPATIENT
Start: 2020-01-08 | End: 2020-06-21 | Stop reason: SDUPTHER

## 2020-01-08 RX ORDER — FAMOTIDINE 20 MG/1
TABLET, FILM COATED ORAL
Qty: 90 TABLET | Refills: 1 | Status: SHIPPED | OUTPATIENT
Start: 2020-01-08 | End: 2020-06-26 | Stop reason: SDUPTHER

## 2020-01-08 RX ORDER — AMLODIPINE BESYLATE 10 MG/1
TABLET ORAL
Qty: 90 TABLET | Refills: 1 | Status: SHIPPED | OUTPATIENT
Start: 2020-01-08 | End: 2020-06-21 | Stop reason: SDUPTHER

## 2020-01-08 RX ORDER — ASPIRIN 81 MG/1
TABLET ORAL
Qty: 90 TABLET | Refills: 3 | Status: SHIPPED | OUTPATIENT
Start: 2020-01-08 | End: 2020-12-28 | Stop reason: SDUPTHER

## 2020-01-08 RX ORDER — ATORVASTATIN CALCIUM 40 MG/1
TABLET, FILM COATED ORAL
Qty: 90 TABLET | Refills: 1 | Status: SHIPPED | OUTPATIENT
Start: 2020-01-08 | End: 2020-06-10 | Stop reason: SDUPTHER

## 2020-01-08 RX ORDER — POTASSIUM CHLORIDE 20 MEQ/1
TABLET, EXTENDED RELEASE ORAL
Qty: 90 TABLET | Refills: 1 | Status: SHIPPED | OUTPATIENT
Start: 2020-01-08 | End: 2020-06-19

## 2020-01-08 RX ORDER — METFORMIN HYDROCHLORIDE 500 MG/1
500 TABLET ORAL NIGHTLY
Qty: 90 TABLET | Refills: 1 | Status: SHIPPED | OUTPATIENT
Start: 2020-01-08 | End: 2020-06-19

## 2020-01-08 RX ORDER — GABAPENTIN 300 MG/1
300 CAPSULE ORAL 3 TIMES DAILY
Qty: 90 CAPSULE | Refills: 11 | Status: SHIPPED | OUTPATIENT
Start: 2020-01-08 | End: 2020-12-28 | Stop reason: SDUPTHER

## 2020-01-08 NOTE — TELEPHONE ENCOUNTER
----- Message from Nighat Irwin sent at 1/8/2020  9:47 AM CST -----  Pt..Type:  Patient Returning Call    Who Called:pt   Who Left Message for Patient:  Does the patient know what this is regarding?: prescriptions   Would the patient rather a call back or a response via [facility]? Call back   Best Call Back Number:[phone]  Additional Information: Pt states he has switch insurance and provider will need to call this for his prescriptions [facility] script

## 2020-01-08 NOTE — TELEPHONE ENCOUNTER
Returned patient's call, patient states that his prescription coverage has changed and now needs all of [facility] and would like us to call [facility] to verify medication at [phone].  Called [facility] at [phone] and was told that the fastest way to process patient's medication was to have it sent electronically to them.  All medication pended and sent to Dr. Smiht for review.

## 2020-01-09 ENCOUNTER — TELEPHONE (OUTPATIENT)
Dept: FAMILY MEDICINE | Facility: CLINIC | Age: 70
End: 2020-01-09

## 2020-01-09 NOTE — TELEPHONE ENCOUNTER
Called patient and explained to the patient that under his insurance plan and [facility] his aspirin 81 mg is not covered by his insurance because it is over the counter.  Patient verbalized understanding of this.

## 2020-01-09 NOTE — TELEPHONE ENCOUNTER
Returned call to patient, patient stated that he got a recorded message from [facility] that one of his medications was not covered on his insurance and that we needed to call them as he couldn't remember the name of the medication.  Number give was [phone].

## 2020-01-09 NOTE — TELEPHONE ENCOUNTER
Called [facility] per patient request as he states that he received a recorded message that one of his medications was not covered on his insurance plan and an alternative was requested but patient could not remember the name of the medication.  Spoke with Jennifer at [employer] who states that she would get a representative on the line to help with the call,Call was placed on hold,  spoke with Rosie who states that his Aspirin(Ecotrin) 81 mg is not covered by his insurance plan as it is an over the counter medication.

## 2020-06-10 DIAGNOSIS — E78.2 MIXED HYPERLIPIDEMIA: Chronic | ICD-10-CM

## 2020-06-10 DIAGNOSIS — Z79.899 ENCOUNTER FOR LONG-TERM (CURRENT) USE OF MEDICATIONS: Chronic | ICD-10-CM

## 2020-06-10 RX ORDER — ATORVASTATIN CALCIUM 40 MG/1
TABLET, FILM COATED ORAL
Qty: 90 TABLET | Refills: 1 | Status: SHIPPED | OUTPATIENT
Start: 2020-06-10 | End: 2020-06-26 | Stop reason: SDUPTHER

## 2020-06-10 NOTE — TELEPHONE ENCOUNTER
----- Message from Arpansusan Aubrey sent at 6/10/2020 11:17 AM CDT -----  Contact: Self   Requesting an RX refill or new RX.  Is this a refill or new RX:    RX name and strength: atorvastatin (LIPITOR) 40 MG tablet  Directions (copy/paste from chart):  : Take 1 tablet (40 mg total) by mouth daily  Is this a 30 day or 90 day RX:    Local pharmacy or mail order pharmacy:  Mail order  Pharmacy name and phone # (copy/paste from chart):   [facility] - [address] [phone] (Phone)  [phone] (Fax)  Comments:  Patient state he have enough medication to last to Wednesday of next week. Please advise.

## 2020-06-10 NOTE — TELEPHONE ENCOUNTER
Spoke with patient, patient notified that rx was sent to Physician on call as Dr. Smith is out of the office today and will return tomorrow.  Patient verbalized understanding of this.

## 2020-06-10 NOTE — TELEPHONE ENCOUNTER
----- Message from Arpansusan Aubrey sent at 6/10/2020 11:17 AM CDT -----  Contact: Self   Requesting an RX refill or new RX.  Is this a refill or new RX:    RX name and strength: atorvastatin (LIPITOR) 40 MG tablet  Directions (copy/paste from chart):  : Take 1 tablet (40 mg total) by mouth daily  Is this a 30 day or 90 day RX:    Local pharmacy or mail order pharmacy:  Mail order  Pharmacy name and phone # (copy/paste from chart):   [facility] - [address] [phone] (Phone)  [phone] (Fax)  Comments:  Patient state he have enough medication to last to Wednesday of next week. Please advise.

## 2020-06-12 ENCOUNTER — PATIENT OUTREACH (OUTPATIENT)
Dept: ADMINISTRATIVE | Facility: HOSPITAL | Age: 70
End: 2020-06-12

## 2020-06-12 NOTE — LETTER
June 30, 2020    GEM Bhardwaj MD             [facility]  [address]  [facility] [address]  Phone: [phone] June 30, 2020     Patient: Jose R Coffman    YOB: 1950   Date of Visit: 6/12/2020       To whom it may concern:     We are seeing Jose R GARCIA MeghnaSHAKIR.O.B is 1950, at [facility]. Landon Smith MD is their primary care physician. To help with our [facility] records could you please send the following:     Most recent Colonoscopy/Pathology Result with recommendations when to repeat procedure.     Please send fax to [phone], Attention Itzel COLON LPN Care Coordination.    Thank-you in advance for your assistance. If you have any questions or concerns, please don't hesitate to contact me at [phone].     Itzel COLON LPN  Care Coordination Department  [facility]

## 2020-06-12 NOTE — PROGRESS NOTES
Health Maintenance Updated.   Immunizations: Abstracted.   Care Everywhere: Abstracted: No results found.     Health Maintenance Due   Topic    TETANUS VACCINE     Colonoscopy

## 2020-06-26 ENCOUNTER — LAB VISIT (OUTPATIENT)
Dept: LAB | Facility: HOSPITAL | Age: 70
End: 2020-06-26
Attending: FAMILY MEDICINE
Payer: MEDICARE

## 2020-06-26 ENCOUNTER — OFFICE VISIT (OUTPATIENT)
Dept: FAMILY MEDICINE | Facility: CLINIC | Age: 70
End: 2020-06-26
Payer: MEDICARE

## 2020-06-26 VITALS
BODY MASS INDEX: 26.09 KG/M2 | SYSTOLIC BLOOD PRESSURE: 138 MMHG | TEMPERATURE: 98 F | WEIGHT: 152.81 LBS | DIASTOLIC BLOOD PRESSURE: 74 MMHG | HEART RATE: 64 BPM | HEIGHT: 64 IN

## 2020-06-26 DIAGNOSIS — E78.2 MIXED HYPERLIPIDEMIA: Chronic | ICD-10-CM

## 2020-06-26 DIAGNOSIS — Z79.899 ENCOUNTER FOR LONG-TERM (CURRENT) USE OF MEDICATIONS: Primary | Chronic | ICD-10-CM

## 2020-06-26 DIAGNOSIS — R73.03 PREDIABETES: Chronic | ICD-10-CM

## 2020-06-26 DIAGNOSIS — I10 ESSENTIAL HYPERTENSION: Chronic | ICD-10-CM

## 2020-06-26 DIAGNOSIS — Z79.899 ENCOUNTER FOR LONG-TERM (CURRENT) USE OF MEDICATIONS: Chronic | ICD-10-CM

## 2020-06-26 LAB
ANION GAP SERPL CALC-SCNC: 8 MMOL/L (ref 8–16)
BUN SERPL-MCNC: 15 MG/DL (ref 8–23)
CALCIUM SERPL-MCNC: 9.6 MG/DL (ref 8.7–10.5)
CHLORIDE SERPL-SCNC: 101 MMOL/L (ref 95–110)
CHOLEST SERPL-MCNC: 220 MG/DL (ref 120–199)
CHOLEST/HDLC SERPL: 5 {RATIO} (ref 2–5)
CO2 SERPL-SCNC: 30 MMOL/L (ref 23–29)
CREAT SERPL-MCNC: 1.2 MG/DL (ref 0.5–1.4)
EST. GFR  (AFRICAN AMERICAN): >60 ML/MIN/1.73 M^2
EST. GFR  (NON AFRICAN AMERICAN): >60 ML/MIN/1.73 M^2
ESTIMATED AVG GLUCOSE: 131 MG/DL (ref 68–131)
GLUCOSE SERPL-MCNC: 119 MG/DL (ref 70–110)
HBA1C MFR BLD HPLC: 6.2 % (ref 4–5.6)
HDLC SERPL-MCNC: 44 MG/DL (ref 40–75)
HDLC SERPL: 20 % (ref 20–50)
LDLC SERPL CALC-MCNC: 159.4 MG/DL (ref 63–159)
NONHDLC SERPL-MCNC: 176 MG/DL
POTASSIUM SERPL-SCNC: 3.3 MMOL/L (ref 3.5–5.1)
SODIUM SERPL-SCNC: 139 MMOL/L (ref 136–145)
TRIGL SERPL-MCNC: 83 MG/DL (ref 30–150)

## 2020-06-26 PROCEDURE — 99213 OFFICE O/P EST LOW 20 MIN: CPT | Mod: S$PBB,,, | Performed by: FAMILY MEDICINE

## 2020-06-26 PROCEDURE — 36415 COLL VENOUS BLD VENIPUNCTURE: CPT | Mod: PO

## 2020-06-26 PROCEDURE — 80061 LIPID PANEL: CPT

## 2020-06-26 PROCEDURE — 99214 OFFICE O/P EST MOD 30 MIN: CPT | Mod: PBBFAC,PO | Performed by: FAMILY MEDICINE

## 2020-06-26 PROCEDURE — 99213 PR OFFICE/OUTPT VISIT, EST, LEVL III, 20-29 MIN: ICD-10-PCS | Mod: S$PBB,,, | Performed by: FAMILY MEDICINE

## 2020-06-26 PROCEDURE — 99999 PR PBB SHADOW E&M-EST. PATIENT-LVL IV: CPT | Mod: PBBFAC,,, | Performed by: FAMILY MEDICINE

## 2020-06-26 PROCEDURE — 83036 HEMOGLOBIN GLYCOSYLATED A1C: CPT

## 2020-06-26 PROCEDURE — 99999 PR PBB SHADOW E&M-EST. PATIENT-LVL IV: ICD-10-PCS | Mod: PBBFAC,,, | Performed by: FAMILY MEDICINE

## 2020-06-26 PROCEDURE — 80048 BASIC METABOLIC PNL TOTAL CA: CPT

## 2020-06-26 RX ORDER — FAMOTIDINE 20 MG/1
TABLET, FILM COATED ORAL
Qty: 90 TABLET | Refills: 3 | Status: SHIPPED | OUTPATIENT
Start: 2020-06-26 | End: 2020-12-28 | Stop reason: SDUPTHER

## 2020-06-26 RX ORDER — ATORVASTATIN CALCIUM 40 MG/1
TABLET, FILM COATED ORAL
Qty: 90 TABLET | Refills: 3 | Status: SHIPPED | OUTPATIENT
Start: 2020-06-26 | End: 2020-08-24 | Stop reason: SDUPTHER

## 2020-06-26 NOTE — PATIENT INSTRUCTIONS
Follow up in about 6 months (around 12/26/2020) for Med refills, LAB RESULTS.     If no improvement in symptoms or symptoms worsen, please be advised to call MD, follow-up at clinic and/or go to ER if becomes severe.    Landon Smith M.D.        We Offer TELEHEALTH & Same Day Appointments!   Book your Telehealth appointment with me through my nurse or   Clinic appointments on [facility]!    [address] [place] [address]    Office: [phone]   FAX: [phone]    Check out my Facebook Page and Follow Me at: [email or web address]    Check out my website at [employer] by clicking on: [email or web address]    To Schedule appointments online, go to [email or web address]: [email or web address]

## 2020-06-26 NOTE — PROGRESS NOTES
==========================================================================  Subjective/Assessment/ Plan:      Patient ID: Jose R Coffman is a 70 y.o. male.  has a past medical history of Hyperlipidemia, Hypertension, and Stroke.   Chief Complaint: Hypertension (3 mo f/u)    Problem List Items Addressed This Visit     Hyperlipemia (Chronic)    Overview     =======================================================  JUNE 2020:  Updating fasting lipid panel today.  Patient doing well on atorvastatin 40 mg.  LDL at goal.  ======================================================  December 2019:  Patient due for updated lipid panel.  Patient is compliant with atorvastatin.  No side effects reported.  CHRONIC. STABLE. Lab analysis reviewed.   (-) CP, SOB, abdominal pain, N/V/D, constipation, jaundice, skin changes.  (-) Myalgias  Lab Results   Component Value Date    CHOL 153 12/04/2019    CHOL 187 06/01/2019     Lab Results   Component Value Date    HDL 42 12/04/2019    HDL 38 06/01/2019     Lab Results   Component Value Date    LDLCALC 97.4 12/04/2019    LDLCALC 138 06/01/2019     Lab Results   Component Value Date    TRIG 68 12/04/2019    TRIG 56 06/01/2019     Lab Results   Component Value Date    CHOLHDL 27.5 12/04/2019     Lab Results   Component Value Date    TOTALCHOLEST 3.6 12/04/2019     Lab Results   Component Value Date    ALT 8 (L) 12/04/2019    AST 13 12/04/2019    ALKPHOS 91 12/04/2019    BILITOT 0.4 12/04/2019     ======================================================           Current Assessment & Plan     Discussed hyperlipidemia disease course.  Discussed the risk of cardiovascular disease, increase stroke and heart attack risk.  Patient voiced understanding and understood the treatment plan. All questions were answered.  Discussed healthy diet and increased need for exercise.           Hypertension (Chronic)    Overview     =======================================================  JUNE 2020:  Blood pressure  controlled on current regimen.  ======================================================  CHRONIC. STABLE. BP Reviewed.  Compliant with BP medications. No SE reported.   Patient reports compliance with amlodipine and chlorthalidone daily.  (-) CP, SOB, palpitations, dizziness, lightheadedness, HA, arm numbness, tingling or weakness, syncope.  Creatinine   Date Value Ref Range Status   12/04/2019 1.3 0.5 - 1.4 mg/dL Final   06/13/2019 1.2 mg/dL Final     ======================================================  Patient reports that he cannot take hydralazine 3 times a day due to intermittent compliance.  Patient reports that lisinopril is contraindicated for him.  Patient says that lisinopril caused his stroke.           Current Assessment & Plan     Check electrolytes and renal function.  Continue current regimen.  Blood pressure control.  Follow-up in six months.Discussed hypertension disease course, DASH-diet and exercise.  Discussed medication regimen importance of treating high blood pressure.  Patient advised of risk of untreated blood pressure.    ER precautions were given for symptoms of hypertensive urgency and emergency.           Prediabetes (Chronic)    Overview     Initial HPI:  Last hemoglobin A1c was a couple of years ago was prediabetic range.  Patient not currently on any metformin or other diabetic medication.  Recently diagnosed with stroke last month.  Patient not currently seeing eye doctor.  Patient has no issues with his feet currently.  ======================================================  October 2019: Patient reports taking the metformin but had to stop it due to constant diarrhea and nausea.   =======================================================  DECEMBER 2019:  Patient reports that he was unable to continue Januvia due to cost.  Patient is back on metformin 500 mg at night and tolerating it okay.  Reviewed Diabetes Management  Status  =======================================================  JUNE 2020:  Reviewed Diabetes Management Status  Statin: Taking  ACE/ARB: Not taking    Screening or Prevention Patient's value Goal Complete/Controlled?   HgA1C Testing and Control   Lab Results   Component Value Date    HGBA1C 5.9 (H) 12/04/2019      Annually/Less than 8% Yes   Lipid profile : 12/04/2019 Annually Yes   LDL control Lab Results   Component Value Date    LDLCALC 97.4 12/04/2019    Annually/Less than 100 mg/dl  Yes   Nephropathy screening Lab Results   Component Value Date    LABMICR 16.0 10/18/2019     No results found for: PROTEINUA Annually Yes   Blood pressure BP Readings from Last 1 Encounters:   06/26/20 138/74    Less than 140/90 Yes   Dilated retinal exam Most Recent Eye Exam Date: Not Found Annually Yes   Foot exam   Most Recent Foot Exam Date: Not Found Annually Yes       ======================================================         Current Assessment & Plan     June 2020:  Discontinue metformin due to GI side effects.  Patient cannot take ACE-inhibitor due to having issues in the past.    Continue metformin 500 mg at night.  Titrate dose according to A1c today.  Checking fasting lipid panel today to see if LDL is at goal.  Other milestones were met.  Follow-up in three months.Monitor hemoglobin A1c.  Discussed diabetic diet and exercise protocol.  Continue medications.  Monitor for side effects.  Discussed checking blood glucose.  Discussed symptoms to monitor for and to notify me immediately if persistent or worsening.  Follow up with Ophthalmology/Optometry and Podiatry.           Encounter for long-term (current) use of medications - Primary (Chronic)    Overview     07/19/2019 Patient is on CHRONIC long-term drug therapy for managed conditions. See medication list. Reports compliance.  No side effects reported.  Routine lab work is being monitored.  Patient does not need refills today. Reviewed blood work from recent  hospitalization.  Patient has hyperglycemia without recent A1c.  Patient was prediabetic years ago.  Not currently on metformin.  Creatinine was preserved.  Blood counts were normal.TSH has been normal in the past.  ======================================================  Patient is on CHRONIC long-term drug therapy for managed conditions. See medication list. Reports compliance.  No side effects reported.  Routine lab work is being monitored.  Patient does need refills today.   =======================================================  DECEMBER 2019:  Plan is to update labs today.  Patient is compliant with medications.  Patient has stop Januvia due to cost.  Back on metformin.  Patient is tolerating at night.  CHRONIC. Stable. Compliant with medications for managed conditions. See medication list. No SE reported.   Routine lab analysis is being monitored. Refills were addressed.  =======================================================  JUNE 2020:  Off metformin to GI side effects.  CHRONIC. Stable. Compliant with medications for managed conditions. See medication list.  Routine lab analysis is being monitored. Refills were addressed.  Lab Results   Component Value Date    WBC 6.41 12/04/2019    HGB 14.5 12/04/2019    HCT 45.7 12/04/2019    MCV 89 12/04/2019     12/04/2019         Chemistry        Component Value Date/Time     12/04/2019 0800     06/13/2019    K 3.6 12/04/2019 0800    K 4.2 06/13/2019     12/04/2019 0800     06/13/2019    CO2 29 12/04/2019 0800    CO2 23 06/13/2019    BUN 18 12/04/2019 0800    BUN 14 06/13/2019    CREATININE 1.3 12/04/2019 0800    CREATININE 1.2 06/13/2019     12/04/2019 0800        Component Value Date/Time    CALCIUM 9.7 12/04/2019 0800    CALCIUM 8.6 06/13/2019    ALKPHOS 91 12/04/2019 0800    AST 13 12/04/2019 0800    ALT 8 (L) 12/04/2019 0800    BILITOT 0.4 12/04/2019 0800    ESTGFRAFRICA >60.0 12/04/2019 0800    EGFRNONAA 55.7 (A) 12/04/2019  "0800          Lab Results   Component Value Date    TSH 1.235 12/04/2019       ======================================================         Current Assessment & Plan     Update baseline labs today.  Monitor kidney function.  Check electrolytes with medications.Complete history and physical was completed today.  Complete and thorough medication reconciliation was performed.  Discussed risks and benefits of medications.  Advised patient on orders and health maintenance.  We discussed old records and old labs if available.  Will request any records not available through epic.  Continue current medications listed on your summary sheet.                I have reviewed the complete PMH, Family History, social history, surgical history, allergies and medications per Epic record at the time of this visit.  Review of Systems see HPI otherwise negative  Objective   /74   Pulse 64   Temp 98.3 °F (36.8 °C) (Oral)   Ht 5' 4" (1.626 m)   Wt 69.3 kg (152 lb 12.8 oz)   BMI 26.23 kg/m²   Physical Exam  Vitals signs and nursing note reviewed.   Constitutional:       General: He is not in acute distress.     Appearance: He is well-developed.   HENT:      Head: Normocephalic and atraumatic.   Eyes:      Pupils: Pupils are equal, round, and reactive to light.   Neck:      Musculoskeletal: Normal range of motion and neck supple.   Cardiovascular:      Rate and Rhythm: Normal rate and regular rhythm.      Heart sounds: Murmur present.   Pulmonary:      Effort: Pulmonary effort is normal. No respiratory distress.      Breath sounds: Normal breath sounds. No wheezing.   Abdominal:      General: Bowel sounds are normal. There is no distension.      Palpations: Abdomen is soft.   Musculoskeletal: Normal range of motion.      Right foot: Normal range of motion. No deformity.      Left foot: Normal range of motion. No deformity.   Feet:      Right foot:      Skin integrity: Callus and dry skin present. No ulcer, blister, skin " breakdown, erythema or warmth.      Left foot:      Skin integrity: Callus and dry skin present. No ulcer, blister, skin breakdown, erythema or warmth.   Skin:     General: Skin is warm and dry.      Capillary Refill: Capillary refill takes less than 2 seconds.   Neurological:      Mental Status: He is alert and oriented to person, place, and time.      Cranial Nerves: No cranial nerve deficit.      Sensory: No sensory deficit.      Motor: No abnormal muscle tone or seizure activity.      Coordination: Coordination normal.      Gait: Gait normal.      Comments: Left upper extremity weakness greater than right.  Bilateral lower extremity slightly weak.  Strength is weakness in the left /hand.    No slurring of speech.   Psychiatric:         Behavior: Behavior normal.       Assessment:     1. Encounter for long-term (current) use of medications    2. Prediabetes    3. Essential hypertension    4. Mixed hyperlipidemia      I have signed for the following orders AND/OR meds.  Orders Placed This Encounter   Procedures    Basic metabolic panel     Standing Status:   Future     Number of Occurrences:   1     Standing Expiration Date:   6/26/2021    Hemoglobin A1C     Standing Status:   Standing     Number of Occurrences:   99     Standing Expiration Date:   6/21/2040    Lipid Panel     Standing Status:   Standing     Number of Occurrences:   99     Standing Expiration Date:   6/21/2040     Medications Ordered This Encounter   Medications    atorvastatin (LIPITOR) 40 MG tablet     Sig: Take 1 tablet (40 mg total) by mouth daily     Dispense:  90 tablet     Refill:  3    famotidine (PEPCID) 20 MG tablet     Sig: Take 1 tablet (20 mg total) by mouth 2 (two) times daily     Dispense:  90 tablet     Refill:  3      Medication List with Changes/Refills   Current Medications    AMLODIPINE (NORVASC) 10 MG TABLET    TAKE 1 TABLET DAILY    ASPIRIN (ECOTRIN) 81 MG EC TABLET    Chew 1 tablet (81 mg total) by mouth daily     CHLORTHALIDONE (HYGROTEN) 25 MG TAB    TAKE 1 TABLET DAILY    GABAPENTIN (NEURONTIN) 300 MG CAPSULE    Take 1 capsule (300 mg total) by mouth 3 (three) times daily.    POTASSIUM CHLORIDE SA (KLOR-CON M20) 20 MEQ TABLET    TAKE 1 TABLET DAILY   Changed and/or Refilled Medications    Modified Medication Previous Medication    ATORVASTATIN (LIPITOR) 40 MG TABLET atorvastatin (LIPITOR) 40 MG tablet       Take 1 tablet (40 mg total) by mouth daily    Take 1 tablet (40 mg total) by mouth daily    FAMOTIDINE (PEPCID) 20 MG TABLET famotidine (PEPCID) 20 MG tablet       Take 1 tablet (20 mg total) by mouth 2 (two) times daily    Take 1 tablet (20 mg total) by mouth 2 (two) times daily   Discontinued Medications    METFORMIN (GLUCOPHAGE) 500 MG TABLET    TAKE 1 TABLET EVERY EVENING     Follow up in about 6 months (around 12/26/2020) for Med refills, LAB RESULTS.  Future Appointments     Date Provider Specialty Appt Notes    6/26/2020  Lab     12/28/2020 Landon Smith MD Family Medicine 6 mo f/u          If no improvement in symptoms or symptoms worsen, advised to call/follow-up at clinic or go to ER. Patient voiced understanding and all questions/concerns were addressed.   DISCLAIMER: This note was compiled by using a speech recognition dictation system and therefore please be aware that typographical / speech recognition errors can and do occur.  Please contact me if you see any errors specifically.  Landon Smith M.D.

## 2020-06-26 NOTE — ASSESSMENT & PLAN NOTE
Update baseline labs today.  Monitor kidney function.  Check electrolytes with medications.Complete history and physical was completed today.  Complete and thorough medication reconciliation was performed.  Discussed risks and benefits of medications.  Advised patient on orders and health maintenance.  We discussed old records and old labs if available.  Will request any records not available through epic.  Continue current medications listed on your summary sheet.

## 2020-06-26 NOTE — ASSESSMENT & PLAN NOTE
Check electrolytes and renal function.  Continue current regimen.  Blood pressure control.  Follow-up in six months.Discussed hypertension disease course, DASH-diet and exercise.  Discussed medication regimen importance of treating high blood pressure.  Patient advised of risk of untreated blood pressure.    ER precautions were given for symptoms of hypertensive urgency and emergency.

## 2020-06-26 NOTE — ASSESSMENT & PLAN NOTE
June 2020:  Discontinue metformin due to GI side effects.  Patient cannot take ACE-inhibitor due to having issues in the past.    Continue metformin 500 mg at night.  Titrate dose according to A1c today.  Checking fasting lipid panel today to see if LDL is at goal.  Other milestones were met.  Follow-up in three months.Monitor hemoglobin A1c.  Discussed diabetic diet and exercise protocol.  Continue medications.  Monitor for side effects.  Discussed checking blood glucose.  Discussed symptoms to monitor for and to notify me immediately if persistent or worsening.  Follow up with Ophthalmology/Optometry and Podiatry.

## 2020-06-27 DIAGNOSIS — E87.6 HYPOKALEMIA: Primary | ICD-10-CM

## 2020-06-27 RX ORDER — POTASSIUM CHLORIDE 750 MG/1
10 CAPSULE, EXTENDED RELEASE ORAL DAILY
Qty: 30 CAPSULE | Refills: 11 | Status: SHIPPED | OUTPATIENT
Start: 2020-06-27 | End: 2020-12-28 | Stop reason: SDUPTHER

## 2020-06-27 NOTE — PROGRESS NOTES
Please CALL patient with results and Document verification.   [phone]  A1c for diabetes and his lipid panel are worse from previous.  Patient needs to improve his diet with low-fat low-carbohydrate.  Also please confirm that patient is taking his medications for cholesterol.  Potassium is slightly low and need to increase potassium supplementation while on blood pressure medications.  Repeat potassium in a few weeks.  Renal function is normal.    Repeat A1c and lipid panel in six months prior to next appointment

## 2020-08-24 DIAGNOSIS — E78.2 MIXED HYPERLIPIDEMIA: Chronic | ICD-10-CM

## 2020-08-24 DIAGNOSIS — Z79.899 ENCOUNTER FOR LONG-TERM (CURRENT) USE OF MEDICATIONS: Chronic | ICD-10-CM

## 2020-08-24 RX ORDER — ATORVASTATIN CALCIUM 40 MG/1
TABLET, FILM COATED ORAL
Qty: 90 TABLET | Refills: 3 | Status: SHIPPED | OUTPATIENT
Start: 2020-08-24 | End: 2020-12-14

## 2020-08-24 NOTE — TELEPHONE ENCOUNTER
----- Message from Marcy Owens sent at 8/24/2020  3:01 PM CDT -----  Regarding: refill  Contact: pt  Type:  RX Refill Request    Who Called:  pt   Refill or New Rx:refill  RX Name and Strength:atorvastatin (LIPITOR) 40 MG tablet  How is the patient currently taking it? (ex. 1XDay): 2Xday  Is this a 30 day or 90 day RX: 90  Preferred Pharmacy with phone number: listed below   Local or Mail Order:   Mail order   Ordering Provider: Dr Smith  Would the patient rather a call back or a response via [facility]?  Best Call Back Number:  Additional Information: pt stated he take the medication 2 times a day for 90 days but was only gave him 90 pills and has ran out           [facility] - [address] [address]  Phone: [phone] Fax: [phone]

## 2020-11-16 ENCOUNTER — TELEPHONE (OUTPATIENT)
Dept: FAMILY MEDICINE | Facility: CLINIC | Age: 70
End: 2020-11-16

## 2020-11-16 DIAGNOSIS — Z79.899 ENCOUNTER FOR LONG-TERM (CURRENT) USE OF MEDICATIONS: Chronic | ICD-10-CM

## 2020-11-16 DIAGNOSIS — E78.2 MIXED HYPERLIPIDEMIA: Chronic | ICD-10-CM

## 2020-11-16 RX ORDER — ATORVASTATIN CALCIUM 40 MG/1
TABLET, FILM COATED ORAL
Qty: 90 TABLET | Refills: 4 | Status: CANCELLED | OUTPATIENT
Start: 2020-11-16

## 2020-11-16 NOTE — TELEPHONE ENCOUNTER
----- Message from Silvia Samuels sent at 11/16/2020  1:35 PM CST -----  Contact: [phone]  Would like to consult with the nurse, patient states that his Rx medication needs to be change to a 190 day supply , patient states that the Dr would like for him to take the Rx medication Lipitor,patient would like to speak with the nurse concerning this, please call back thanks sj

## 2020-11-16 NOTE — TELEPHONE ENCOUNTER
----- Message from Silvia Samuels sent at 11/16/2020  1:35 PM CST -----  Contact: [phone]  Would like to consult with the nurse, patient states that his Rx medication needs to be change to a 190 day supply , patient states that the Dr would like for him to take the Rx medication Lipitor,patient would like to speak with the nurse concerning this, please call back thanks sj

## 2020-11-16 NOTE — TELEPHONE ENCOUNTER
Attempted to call patient about his medication, no answer and voicemail has not been set up so I could not leave a message.

## 2020-12-23 RX ORDER — SODIUM, POTASSIUM,MAG SULFATES 17.5-3.13G
1 SOLUTION, RECONSTITUTED, ORAL ORAL DAILY
Qty: 1 KIT | Refills: 0 | Status: CANCELLED | OUTPATIENT
Start: 2020-12-23 | End: 2020-12-25

## 2020-12-23 NOTE — PROGRESS NOTES
"PLAN:      Problem List Items Addressed This Visit     Cerebrovascular accident (CVA) (Chronic)     December 2020:  Patient reports that he has done well with therapy.  He is left with very minimal residual deficits.  Patient has been compliant with his medications as prescribed.  Patient does follow with Neurology.  Dr. Dowd.  Needs better LDL goal.    Update October 2019:  Blood pressure is controlled today on hydralazine and amlodipine.  Patient wants to stop hydralazine due to intermittent compliance from 3 times a day.  Patient reports that he cannot take lisinopril due to causing his stroke " and giving him a cough.  I will changes hydralazine to chlorthalidone and recheck his blood pressure in two weeks.  Patient will follow up with me in four weeks.  Patient will notify me blood pressure is not controlled at home.  Patient reports that his wife takes his blood pressure.    Prediabetes:  Patient is due for hemoglobin A1c.  Patient cannot take metformin due to GI side effects.  Switching patient to Januvia.    Patient remains on statin and aspirin.  Checking lipid panel.    ===================================================  Initial plan:  Reviewed records from previous hospitalization.  Continue physical therapy.  Continue medications for blood pressure as well as aspirin statin and checking diabetes status.         Hyperlipemia (Chronic)     Increase Lipitor to 80 mg daily.Counseled on hyperlipidemia disease course, healthy diet and increased need for exercise.  Please be advised of the risk of cardiovascular disease, increase stroke and heart attack risk with uncontrolled/untreated hyperlipidemia.     Patient voiced understanding and understood the treatment plan. All questions were answered.              Relevant Medications    atorvastatin (LIPITOR) 80 MG tablet    Hypertension (Chronic)     Check electrolytes and renal function.  Continue current regimen.  Blood pressure control.  Follow-up in six " months.Discussed hypertension disease course, DASH-diet and exercise.  Discussed medication regimen importance of treating high blood pressure.  Patient advised of risk of untreated blood pressure.    ER precautions were given for symptoms of hypertensive urgency and emergency.           Relevant Medications    amLODIPine (NORVASC) 10 MG tablet    chlorthalidone (HYGROTEN) 25 MG Tab    Hypokalemia (Chronic)     Increase potassium supplementation.  Increase compliance.  Recheck potassium level.    Signs and symptoms of hypokalemia were discussed with the patient.  ER precautions.         Relevant Medications    potassium chloride (MICRO-K) 10 MEQ CpSR    Encounter for long-term (current) use of medications (Chronic)     Patient with persistent low potassium.  Increase supplementation and compliance with supplement.  Continue all other medications.  Refills today.Complete history and physical was completed today.  Complete and thorough medication reconciliation was performed.  Discussed risks and benefits of medications.  Advised patient on orders and health maintenance.  We discussed old records and old labs if available.  Will request any records not available through epic.  Continue current medications listed on your summary sheet.           Relevant Medications    aspirin (ECOTRIN) 81 MG EC tablet    famotidine (PEPCID) 20 MG tablet    amLODIPine (NORVASC) 10 MG tablet    chlorthalidone (HYGROTEN) 25 MG Tab    potassium chloride (MICRO-K) 10 MEQ CpSR    gabapentin (NEURONTIN) 300 MG capsule    atorvastatin (LIPITOR) 80 MG tablet    Neuropathy due to medical condition (Chronic)     Continue gabapentin.   Risk and benefits were discussed.         Relevant Medications    gabapentin (NEURONTIN) 300 MG capsule    Hemiparesis of left nondominant side as late effect of cerebral infarction (Chronic)     Continue home exercise program provided by physical therapy.    ER precautions if symptoms or signs get worse or  change.           Other Visit Diagnoses     Well adult exam    -  Primary    Encounter for lipid screening for cardiovascular disease        Need for pneumococcal vaccine        Relevant Orders    (In Office Administered) Pneumococcal Polysaccharide Vaccine (23 Valent) (SQ/IM) (Completed)    Flu vaccine need        Relevant Orders    Influenza (FLUAD) - Quadrivalent (Adjuvanted) *Preferred* (65+) (PF) (Completed)    Encounter for prostate cancer screening        Relevant Orders    PSA, Screening (Completed)        Future Appointments     Date Provider Specialty Appt Notes    6/28/2021 Landon Smith MD Family Medicine 6 mo f/u htn    12/20/2021 Landon Smith MD Family Medicine annual           Medication List with Changes/Refills   Changed and/or Refilled Medications    Modified Medication Previous Medication    AMLODIPINE (NORVASC) 10 MG TABLET amLODIPine (NORVASC) 10 MG tablet       TAKE 1 TABLET DAILY    TAKE 1 TABLET DAILY    ASPIRIN (ECOTRIN) 81 MG EC TABLET aspirin (ECOTRIN) 81 MG EC tablet       Chew 1 tablet (81 mg total) by mouth daily    Chew 1 tablet (81 mg total) by mouth daily    ATORVASTATIN (LIPITOR) 80 MG TABLET atorvastatin (LIPITOR) 40 MG tablet       Take 1 tablet (80 mg total) by mouth every evening. TAKE 1 TABLET DAILY    TAKE 1 TABLET DAILY    CHLORTHALIDONE (HYGROTEN) 25 MG TAB chlorthalidone (HYGROTEN) 25 MG Tab       Take 1 tablet (25 mg total) by mouth once daily.    TAKE 1 TABLET DAILY    FAMOTIDINE (PEPCID) 20 MG TABLET famotidine (PEPCID) 20 MG tablet       Take 1 tablet (20 mg total) by mouth 2 (two) times daily    Take 1 tablet (20 mg total) by mouth 2 (two) times daily    GABAPENTIN (NEURONTIN) 300 MG CAPSULE gabapentin (NEURONTIN) 300 MG capsule       Take 1 capsule (300 mg total) by mouth 3 (three) times daily.    Take 1 capsule (300 mg total) by mouth 3 (three) times daily.    POTASSIUM CHLORIDE (MICRO-K) 10 MEQ CPSR potassium chloride (MICRO-K) 10 MEQ CpSR       Take 1  capsule (10 mEq total) by mouth once daily.    Take 1 capsule (10 mEq total) by mouth once daily.   Discontinued Medications    POTASSIUM CHLORIDE SA (KLOR-CON M20) 20 MEQ TABLET    TAKE 1 TABLET DAILY       Landon Smith M.D.     ==========================================================================  Subjective:      Patient ID: Jose R Coffman is a 70 y.o. male.  has a past medical history of Hyperlipidemia, Hypertension, and Stroke.     Chief Complaint: Hypertension (6 mo f/u  had labs drawn this am) and Annual Exam      Problem List Items Addressed This Visit     Cerebrovascular accident (CVA) (Chronic)    Overview     Initial HPI:  Subacute.  New problem.  Patient experienced stroke starting May 31, 2019.  Patient was started on aspirin statin and blood pressure medications.  Blood pressure well controlled today.  Patient to go to rehab for few days.  Was having some lower extremity weakness and left upper arm weakness.  His strength is improving.  Still continuing physical therapy at [facility].    MRI report from [facility]:   1.  Small acute ischemic infarct of the posterior limb of the right internal capsule.   2.  Chronic lacunar infarcts of the right basal ganglia and right cerebellar hemisphere.  3. Minimal white matter changes, although nonspecific, likely indicate leukomalacia of chronic microvascular ischemia.         Electronically signed by Ger Arthur MD on 5/31/2019 1:23 PM  =================================================  [facility] DISCHARGE SUMMARY    Patient ID:  Jose R Coffman  [id number]  69 y.o.  1950    Admit Date:   5/31/2019 9:54 AM    Discharge Date:   Discharge Today: 6/4/2019    Admitting Physician:   German Hansen MD     Discharge Physician:   PORTIA GUTHRIE MD    Consults:  Consultants   Provider Service Role Specialty   Nile-Adonis Ortiz MD -- Consulting Physician Neurology       Reason for Admission/Admission Diagnoses:   Present on Admission:    Cerebrovascular accident (CVA) ([facility])    Discharge Diagnoses:     Right MCA stroke involving posterior limb of internal capsule  Likely due to arthrosclerotic disease.  MRI showing Small acute ischemic infarct of the posterior limb of the right internal capsule.  CTA of head and neck,   -No flow-limiting stenosis or embolus is identified.  -Calcified atherosclerotic plaque formation of the internal carotid arteries at siphon level.  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  Check Echo showed EF of 65% and no left atrial clot. Doppler carotids less than 50% stenosis bilaterally, , TSH 0.85  Cont Aspirin, lipitor . Neurology following patient.   Accepted by [facility] for In pT rehab, discharge to [facility]       HTN   Continue meds.     HLD  On lipitor      Right kidney measuring 6.8 x 5.8 cm.    There are a few other smaller cysts involving the kidneys  - PCP to arrange for out Patient urology for monitoring of cyst.    Active Hospital Problems   Diagnosis Date Noted    Cerebrovascular accident (CVA) ([facility]) 05/31/2019     Resolved Hospital Problems     History of Present Illness:   69 years old AA male with PMHx of HTN / HLD / CVA was brought to the ED with complaints of possible CVA.   Was transferred from an out side facility for the evaluation of CVA.   His symptoms developed 24 hrs prior to admission with symptoms of Slurred speech and left Upper extremity numbness.  Ct Scan showed some old CVA - which family did not know about it.   Wife indicated his BP was 200's/ 100's - She indicated He is compliance with his treatment.     Hospital Course and Treatment:     In the hospital, CT brain showed old infarct and He was admitted for acute CVA. MRI brain was diagnostic for Rt internal capsule infarct, CTA head and neck didn't show any significant vascular disease.  He was seen in consult by Nephrology and was managed conservatively  He is currently stable for discharge to In Pt rehab.  He reported abdominal discomfort  for which Ct abd showed incidental Rt renal cyst, without any symptom.  He was notified of this finding and was advised to f/up his PCP on Discharge, who should arrange for Out Pt urology evaluation.    Admission Information   Date & Time  5/31/2019 Provider  Braydon Gonzáles MD [facility]. Phone  [phone]       I discussed with the patient disease process and treatment.     I have personally seen and examined the patient, Jose R Coffman, in a face to face encounter on the date of discharge.     He is cleared for discharge with instructions to follow up as directed.   Total time in the care and discharge planning of this patient was less than 30 minutes.    Significant Diagnostic Studies:  Recent Imaging and Procedure Results   Procedure Component Value Ref Range Date/Time   CT Abdomen Pelvis W/WO Contrast [[id number]] Collected: 06/03/2019 1508   Updated: 06/03/2019 1514   Narrative:   EXAM: CT SCAN OF THE ABDOMEN AND PELVIS WITH AND WITHOUT CONTRAST    HISTORY: Left lower abdominal pain    TECHNIQUE: Axial images were acquired. Patient was given IV contrast    FINDINGS:     The lung bases are clear.  The liver is normal. The gallbladder is unremarkable.  The spleen is normal.  No pancreatic abnormality is identified.  The adrenal glands are normal.  There is a large cyst involving the right kidney measuring 6.8 x 5.8 cm. No renal masses. There couple small cysts noted elsewhere involving the kidneys. No hydronephrosis.  Atherosclerosis of the abdominal aorta. No evidence of aneurysm.  No evidence of bowel obstruction. Normal appendix. No evidence of diverticulitis.  There are no abnormal masses in the pelvis. There are no abnormal fluid collections in the pelvis.  Urinary bladder is normal.  No significant osseous abnormality is identified.    IMPRESSION: No acute findings. There is a large cyst involving the right kidney measuring 6.8 x 5.8 cm. There are a few  other smaller cysts involving the kidneys.    All CT scans at [this location] are performed using dose modulation techniques as appropriate to a performed exam including the following: automated exposure control; adjustment of the mA and/or kV according to patient size (this includes techniques or   standardized protocols for targeted exams where dose is matched to indication / reason for exam; i.e. extremities or head); use of iterative reconstruction technique.     Finalized on: 6/3/2019 3:12 PM By: Jefe Guillen MD  [facility]# [id number] 2019-06-03 15:14:45.207 BR   CT Angiogram Head W/WO Contrast [[id number]] Collected: 06/01/2019 1237   Updated: 06/01/2019 1309   Narrative:   REASON FOR EXAM: Acute CVA / worsening symptoms.     TECHNICAL FACTORS: Intravenous contrast images were obtained of the head with image postprocessing, including 3-D volume rendering reconstruction. Non-intravenous contrast  images were obtained. Images are stored in the patient's permanent record.    COMPARISON: None    FINDINGS: The internal carotid, anterior cerebral, middle cerebral, and anterior communicating arteries are patent without evidence of stenosis or embolus.There is congenital absence of the right A1 segment. There is minimal calcified atherosclerotic   plaque formation of the internal carotid arteries at siphon level. The vertebral, basilar, and posterior cerebral arteries are patent without evidence of stenosis or embolus. There is no evidence of aneurysm.     IMPRESSION:  1. No flow-limiting stenosis or embolus is identified.  2. Calcified atherosclerotic plaque formation of the internal carotid arteries at siphon level.  3. 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  Electronically signed by Sammy Valverde MD on 6/1/2019 1:06 PM    CT Head WO Contrast [[id number]] Collected: 06/01/2019 0807   Updated: 06/01/2019 0815   Narrative:   REASON FOR EXAM: Worsening CVA ? possible bleed.     TECHNICAL FACTORS: 5 mm contiguous  "axial CT images were obtained from the foramen magnum to the skull vertex.     COMPARISON: CT brain 05/31/2019. MRI brain 03/31/2019.    FINDINGS: The ventricular system is within normal limits. Cortical sulci are minimally deepened. Perimesencephalic, quadrigeminal and suprasellar cisterns appear normal. A 6 x 12 mm well-defined low-density area is present within the anterior limb of the   right internal capsule, unchanged. There is a new 9 x 9 mm ill-defined low-density area within the posterior limb of right internal capsule, corresponding in location and size to the MRI finding. The white-grey matter interface is preserved. No   intracranial hemorrhage is identified.    The calvarium is intact. Middle ear and mastoid areas are aerated. Paranasal sinuses are aerated. Orbit contents appear unremarkable.    Impression:   1. Evolving, acute microvascular ischemic infarct in the posterior limb of the right internal capsule similar in extent to MRI 05/31/2019.  2. Old lacunar infarct in the anterior limb of the right internal capsule.  3. Minimally diminished brain volume.   ================================================================================         Current Assessment & Plan     December 2020:  Patient reports that he has done well with therapy.  He is left with very minimal residual deficits.  Patient has been compliant with his medications as prescribed.  Patient does follow with Neurology.  Dr. Dowd.  Needs better LDL goal.    Update October 2019:  Blood pressure is controlled today on hydralazine and amlodipine.  Patient wants to stop hydralazine due to intermittent compliance from 3 times a day.  Patient reports that he cannot take lisinopril due to causing his stroke " and giving him a cough.  I will changes hydralazine to chlorthalidone and recheck his blood pressure in two weeks.  Patient will follow up with me in four weeks.  Patient will notify me blood pressure is not controlled at home.  " Patient reports that his wife takes his blood pressure.    Prediabetes:  Patient is due for hemoglobin A1c.  Patient cannot take metformin due to GI side effects.  Switching patient to Januvia.    Patient remains on statin and aspirin.  Checking lipid panel.    ===================================================  Initial plan:  Reviewed records from previous hospitalization.  Continue physical therapy.  Continue medications for blood pressure as well as aspirin statin and checking diabetes status.         Hyperlipemia (Chronic)    Overview     December 2020:  Compliant with 40 mg of atorvastatin daily.  Will recheck lipids today.  ======================================  JUNE 2020:  Updating fasting lipid panel today.  Patient doing well on atorvastatin 40 mg.  LDL at goal.  =============================================  December 2019:  Patient due for updated lipid panel.  Patient is compliant with atorvastatin.  No side effects reported.  CHRONIC. STABLE. Lab analysis reviewed.   (-) CP, SOB, abdominal pain, N/V/D, constipation, jaundice, skin changes.  (-) Myalgias  Lab Results   Component Value Date    CHOL 202 (H) 12/28/2020    CHOL 220 (H) 06/26/2020    CHOL 153 12/04/2019     Lab Results   Component Value Date    HDL 42 12/28/2020    HDL 44 06/26/2020    HDL 42 12/04/2019     Lab Results   Component Value Date    LDLCALC 139.6 12/28/2020    LDLCALC 159.4 (H) 06/26/2020    LDLCALC 97.4 12/04/2019     Lab Results   Component Value Date    TRIG 102 12/28/2020    TRIG 83 06/26/2020    TRIG 68 12/04/2019     Lab Results   Component Value Date    CHOLHDL 20.8 12/28/2020    CHOLHDL 20.0 06/26/2020    CHOLHDL 27.5 12/04/2019     Lab Results   Component Value Date    TOTALCHOLEST 4.8 12/28/2020    TOTALCHOLEST 5.0 06/26/2020    TOTALCHOLEST 3.6 12/04/2019     Lab Results   Component Value Date    ALT 21 12/28/2020    AST 21 12/28/2020    ALKPHOS 92 12/28/2020    BILITOT 0.6 12/28/2020      ======================================================           Current Assessment & Plan     Increase Lipitor to 80 mg daily.Counseled on hyperlipidemia disease course, healthy diet and increased need for exercise.  Please be advised of the risk of cardiovascular disease, increase stroke and heart attack risk with uncontrolled/untreated hyperlipidemia.     Patient voiced understanding and understood the treatment plan. All questions were answered.              Hypertension (Chronic)    Overview     ====================================================  JUNE 2020:  Blood pressure controlled on current regimen.  December 2020:  BP well controlled on current regimen amlodipine and chlorthalidone.  CHRONIC. STABLE. BP Reviewed.  Compliant with BP medications. No SE reported.   Patient reports compliance with amlodipine and chlorthalidone daily.  (-) CP, SOB, palpitations, dizziness, lightheadedness, HA, arm numbness, tingling or weakness, syncope.  Creatinine   Date Value Ref Range Status   12/04/2019 1.3 0.5 - 1.4 mg/dL Final   06/13/2019 1.2 mg/dL Final     ====================================================  Previous HPI :  Patient reports that he cannot take hydralazine 3 times a day due to intermittent compliance.  Patient reports that lisinopril is contraindicated for him.  Patient says that lisinopril caused his stroke.           Current Assessment & Plan     Check electrolytes and renal function.  Continue current regimen.  Blood pressure control.  Follow-up in six months.Discussed hypertension disease course, DASH-diet and exercise.  Discussed medication regimen importance of treating high blood pressure.  Patient advised of risk of untreated blood pressure.    ER precautions were given for symptoms of hypertensive urgency and emergency.           Hypokalemia (Chronic)    Overview     Chronic.  Persistent.  Patient reports compliance with potassium supplementation.  Likely due to medications.  Lab Results    Component Value Date    K 3.1 (L) 12/28/2020              Current Assessment & Plan     Increase potassium supplementation.  Increase compliance.  Recheck potassium level.    Signs and symptoms of hypokalemia were discussed with the patient.  ER precautions.         Encounter for long-term (current) use of medications (Chronic)    Overview     07/19/2019 CHRONIC long-term drug therapy for managed conditions. See medication list. Reports compliance.  No side effects reported.  Routine lab work is being monitored.  Patient does not need refills today. Reviewed blood work from recent hospitalization.  Patient has hyperglycemia without recent A1c.  Patient was prediabetic years ago.  Not currently on metformin.  Creatinine was preserved.  Blood counts were normal.TSH has been normal in the past.  DECEMBER 2019:  Plan is to update labs today.  Patient is compliant with medications.  Patient has stop Januvia due to cost.  Back on metformin.  Patient is tolerating at night.  CHRONIC. Stable. Compliant with medications for managed conditions. See medication list. No SE reported. Routine lab analysis is being monitored. Refills were addressed.  JUNE 2020:  Off metformin to GI side effects.CHRONIC. Stable. Compliant with medications for managed conditions. See medication list.Routine lab analysis is being monitored. Refills were addressed.  ==================================================  DECEMBER 2020:  CHRONIC. Stable. Compliant with medications for managed conditions. See medication list. No SE reported.   Routine lab analysis is being monitored. Refills were addressed.  Lab Results   Component Value Date    WBC 8.50 12/28/2020    HGB 15.9 12/28/2020    HCT 47.5 12/28/2020    MCV 86 12/28/2020     12/28/2020         Chemistry        Component Value Date/Time     12/28/2020 0911     06/13/2019    K 3.1 (L) 12/28/2020 0911    K 4.2 06/13/2019     12/28/2020 0911     06/13/2019    CO2 29  12/28/2020 0911    CO2 23 06/13/2019    BUN 19 12/28/2020 0911    BUN 14 06/13/2019    CREATININE 1.4 12/28/2020 0911    CREATININE 1.2 06/13/2019     12/28/2020 0911        Component Value Date/Time    CALCIUM 9.8 12/28/2020 0911    CALCIUM 8.6 06/13/2019    ALKPHOS 92 12/28/2020 0911    AST 21 12/28/2020 0911    ALT 21 12/28/2020 0911    BILITOT 0.6 12/28/2020 0911    ESTGFRAFRICA 58.4 (A) 12/28/2020 0911    EGFRNONAA 50.5 (A) 12/28/2020 0911          Lab Results   Component Value Date    TSH 1.235 12/04/2019       =================================================         Current Assessment & Plan     Patient with persistent low potassium.  Increase supplementation and compliance with supplement.  Continue all other medications.  Refills today.Complete history and physical was completed today.  Complete and thorough medication reconciliation was performed.  Discussed risks and benefits of medications.  Advised patient on orders and health maintenance.  We discussed old records and old labs if available.  Will request any records not available through epic.  Continue current medications listed on your summary sheet.           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  Worsening since his stroke.  Also associated with diabetes.  Patient reports sharp shooting pain in his left arm and leg intermittently.  Patient is requesting something for the pain.  December 2020:  Patient reports compliance with gabapentin.  Intermittent control.         Current Assessment & Plan     Continue gabapentin.   Risk and benefits were discussed.         Hemiparesis of left nondominant side as late effect of cerebral infarction (Chronic)    Overview     Chronic.  Result of his stroke and May and June of 2019.  Patient reports he still very weak in his left hand.  Patient has decreased strength in the left upper and lower extremity         Current Assessment & Plan     Continue home  exercise program provided by physical therapy.    ER precautions if symptoms or signs get worse or change.           Other Visit Diagnoses     Well adult exam    -  Primary    Encounter for lipid screening for cardiovascular disease        Need for pneumococcal vaccine        Flu vaccine need        Encounter for prostate cancer screening               Past Medical History:  Past Medical History:   Diagnosis Date    Hyperlipidemia     Hypertension     Stroke      History reviewed. No pertinent surgical history.  Review of patient's allergies indicates:   Allergen Reactions    Lisinopril      Medication List with Changes/Refills   Changed and/or Refilled Medications    Modified Medication Previous Medication    AMLODIPINE (NORVASC) 10 MG TABLET amLODIPine (NORVASC) 10 MG tablet       TAKE 1 TABLET DAILY    TAKE 1 TABLET DAILY    ASPIRIN (ECOTRIN) 81 MG EC TABLET aspirin (ECOTRIN) 81 MG EC tablet       Chew 1 tablet (81 mg total) by mouth daily    Chew 1 tablet (81 mg total) by mouth daily    ATORVASTATIN (LIPITOR) 80 MG TABLET atorvastatin (LIPITOR) 40 MG tablet       Take 1 tablet (80 mg total) by mouth every evening. TAKE 1 TABLET DAILY    TAKE 1 TABLET DAILY    CHLORTHALIDONE (HYGROTEN) 25 MG TAB chlorthalidone (HYGROTEN) 25 MG Tab       Take 1 tablet (25 mg total) by mouth once daily.    TAKE 1 TABLET DAILY    FAMOTIDINE (PEPCID) 20 MG TABLET famotidine (PEPCID) 20 MG tablet       Take 1 tablet (20 mg total) by mouth 2 (two) times daily    Take 1 tablet (20 mg total) by mouth 2 (two) times daily    GABAPENTIN (NEURONTIN) 300 MG CAPSULE gabapentin (NEURONTIN) 300 MG capsule       Take 1 capsule (300 mg total) by mouth 3 (three) times daily.    Take 1 capsule (300 mg total) by mouth 3 (three) times daily.    POTASSIUM CHLORIDE (MICRO-K) 10 MEQ CPSR potassium chloride (MICRO-K) 10 MEQ CpSR       Take 1 capsule (10 mEq total) by mouth once daily.    Take 1 capsule (10 mEq total) by mouth once daily.  "  Discontinued Medications    POTASSIUM CHLORIDE SA (KLOR-CON M20) 20 MEQ TABLET    TAKE 1 TABLET DAILY      Social History     Tobacco Use    Smoking status: Never Smoker   Substance Use Topics    Alcohol use: Not on file      Family History   Problem Relation Age of Onset    Cancer Mother         throat    Heart disease Father     Heart disease Brother     Cancer Brother        I have reviewed the complete PMH, social history, surgical history, allergies and medications.  As well as family history.    Review of Systems   Constitutional: Negative for chills, fatigue, fever and unexpected weight change.   HENT: Negative for ear pain and sore throat.    Eyes: Negative for redness and visual disturbance.   Respiratory: Negative for cough and shortness of breath.    Cardiovascular: Negative for chest pain and palpitations.   Gastrointestinal: Negative for nausea and vomiting.   Endocrine: Negative for cold intolerance and heat intolerance.   Genitourinary: Negative for difficulty urinating and hematuria.   Musculoskeletal: Negative for arthralgias and myalgias.   Skin: Negative for rash and wound.   Allergic/Immunologic: Negative for environmental allergies and food allergies.   Neurological: Positive for weakness (Still with left upper and lower extremity weakness.  Worse in the left hand.). Negative for headaches.        + neuropathy   Hematological: Negative for adenopathy. Does not bruise/bleed easily.   Psychiatric/Behavioral: Negative for sleep disturbance. The patient is not nervous/anxious.      Objective:   /74   Pulse 72   Temp 98.2 °F (36.8 °C) (Oral)   Ht 5' 5" (1.651 m)   Wt 71.4 kg (157 lb 6.4 oz)   BMI 26.19 kg/m²   Physical Exam  Vitals signs and nursing note reviewed.   Constitutional:       General: He is not in acute distress.     Appearance: He is well-developed.   HENT:      Head: Normocephalic and atraumatic.   Eyes:      Pupils: Pupils are equal, round, and reactive to light. "   Neck:      Musculoskeletal: Normal range of motion and neck supple.   Cardiovascular:      Rate and Rhythm: Normal rate and regular rhythm.      Heart sounds: Murmur present.   Pulmonary:      Effort: Pulmonary effort is normal. No respiratory distress.      Breath sounds: Normal breath sounds. No wheezing.   Abdominal:      General: Bowel sounds are normal. There is no distension.      Palpations: Abdomen is soft.   Musculoskeletal: Normal range of motion.      Right foot: Normal range of motion. No deformity.      Left foot: Normal range of motion. No deformity.   Feet:      Right foot:      Skin integrity: Callus and dry skin present. No ulcer, blister, skin breakdown, erythema or warmth.      Left foot:      Skin integrity: Callus and dry skin present. No ulcer, blister, skin breakdown, erythema or warmth.   Skin:     General: Skin is warm and dry.      Capillary Refill: Capillary refill takes less than 2 seconds.   Neurological:      Mental Status: He is alert and oriented to person, place, and time. Mental status is at baseline.      GCS: GCS eye subscore is 4. GCS verbal subscore is 5. GCS motor subscore is 6.      Cranial Nerves: No cranial nerve deficit.      Sensory: Sensory deficit present.      Motor: Weakness present. No abnormal muscle tone or seizure activity.      Coordination: Coordination normal.      Gait: Gait normal.      Comments: Slight Left upper extremity weakness in the left /hand.  No slurring of speech.   Psychiatric:         Behavior: Behavior normal.         Assessment:     1. Well adult exam    2. Encounter for lipid screening for cardiovascular disease    3. Need for pneumococcal vaccine    4. Flu vaccine need    5. Encounter for prostate cancer screening    6. Encounter for long-term (current) use of medications    7. Neuropathy due to medical condition    8. Hemiparesis of left nondominant side as late effect of cerebral infarction    9. 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 Essential hypertension    11. Mixed hyperlipidemia    12. Hypokalemia      MDM:   Patient here for annual wellness exam.  See other note for annual wellness.  This noted specifically for chronic conditions medication refills.  I have Reviewed and summarized old records.  I have performed thorough medication reconciliation today and discussed risk and benefits of each medication.  I have reviewed  labs and discussed with patient.  All questions were answered.  I am requesting old records and will review them once they are available.    I have signed for the following orders AND/OR meds.  Orders Placed This Encounter   Procedures    Influenza (FLUAD) - Quadrivalent (Adjuvanted) *Preferred* (65+) (PF)    (In Office Administered) Pneumococcal Polysaccharide Vaccine (23 Valent) (SQ/IM)    PSA, Screening     Standing Status:   Future     Number of Occurrences:   1     Standing Expiration Date:   2/26/2022     Medications Ordered This Encounter   Medications    amLODIPine (NORVASC) 10 MG tablet     Sig: TAKE 1 TABLET DAILY     Dispense:  90 tablet     Refill:  4     .    aspirin (ECOTRIN) 81 MG EC tablet     Sig: Chew 1 tablet (81 mg total) by mouth daily     Dispense:  90 tablet     Refill:  4    atorvastatin (LIPITOR) 80 MG tablet     Sig: Take 1 tablet (80 mg total) by mouth every evening. TAKE 1 TABLET DAILY     Dispense:  90 tablet     Refill:  4    chlorthalidone (HYGROTEN) 25 MG Tab     Sig: Take 1 tablet (25 mg total) by mouth once daily.     Dispense:  90 tablet     Refill:  4     .    famotidine (PEPCID) 20 MG tablet     Sig: Take 1 tablet (20 mg total) by mouth 2 (two) times daily     Dispense:  90 tablet     Refill:  4    gabapentin (NEURONTIN) 300 MG capsule     Sig: Take 1 capsule (300 mg total) by mouth 3 (three) times daily.     Dispense:  270 capsule     Refill:  4    potassium chloride (MICRO-K) 10 MEQ CpSR     Sig: Take 1 capsule (10 mEq total) by  mouth once daily.     Dispense:  90 capsule     Refill:  4        Follow up in about 1 year (around 12/28/2021), or if symptoms worsen or fail to improve, for Annual Wellness Exam, 6month for medication refills and lab results..    If no improvement in symptoms or symptoms worsen, advised to call/follow-up at clinic or go to ER. Patient voiced understanding and all questions/concerns were addressed.     DISCLAIMER: This note was compiled by using a speech recognition dictation system and therefore please be aware that typographical / speech recognition errors can and do occur.  Please contact me if you see any errors specifically.    Landon Smith M.D.       Office: [phone]   [address] [place] [address]  FAX: [phone]

## 2020-12-28 ENCOUNTER — LAB VISIT (OUTPATIENT)
Dept: LAB | Facility: HOSPITAL | Age: 70
End: 2020-12-28
Attending: FAMILY MEDICINE
Payer: MEDICARE

## 2020-12-28 ENCOUNTER — OFFICE VISIT (OUTPATIENT)
Dept: FAMILY MEDICINE | Facility: CLINIC | Age: 70
End: 2020-12-28
Payer: MEDICARE

## 2020-12-28 ENCOUNTER — PATIENT OUTREACH (OUTPATIENT)
Dept: ADMINISTRATIVE | Facility: HOSPITAL | Age: 70
End: 2020-12-28

## 2020-12-28 VITALS
BODY MASS INDEX: 26.22 KG/M2 | SYSTOLIC BLOOD PRESSURE: 126 MMHG | WEIGHT: 157.38 LBS | DIASTOLIC BLOOD PRESSURE: 74 MMHG | HEIGHT: 65 IN | HEART RATE: 72 BPM | TEMPERATURE: 98 F

## 2020-12-28 DIAGNOSIS — Z13.6 ENCOUNTER FOR LIPID SCREENING FOR CARDIOVASCULAR DISEASE: ICD-10-CM

## 2020-12-28 DIAGNOSIS — I63.011 CEREBROVASCULAR ACCIDENT (CVA) DUE TO THROMBOSIS OF RIGHT VERTEBRAL ARTERY: ICD-10-CM

## 2020-12-28 DIAGNOSIS — Z12.5 ENCOUNTER FOR PROSTATE CANCER SCREENING: ICD-10-CM

## 2020-12-28 DIAGNOSIS — Z79.899 ENCOUNTER FOR LONG-TERM (CURRENT) USE OF MEDICATIONS: Chronic | ICD-10-CM

## 2020-12-28 DIAGNOSIS — E87.6 HYPOKALEMIA: ICD-10-CM

## 2020-12-28 DIAGNOSIS — E78.2 MIXED HYPERLIPIDEMIA: Chronic | ICD-10-CM

## 2020-12-28 DIAGNOSIS — I10 ESSENTIAL HYPERTENSION: Chronic | ICD-10-CM

## 2020-12-28 DIAGNOSIS — Z23 NEED FOR PNEUMOCOCCAL VACCINE: ICD-10-CM

## 2020-12-28 DIAGNOSIS — I69.354 HEMIPARESIS OF LEFT NONDOMINANT SIDE AS LATE EFFECT OF CEREBRAL INFARCTION: ICD-10-CM

## 2020-12-28 DIAGNOSIS — Z13.220 ENCOUNTER FOR LIPID SCREENING FOR CARDIOVASCULAR DISEASE: ICD-10-CM

## 2020-12-28 DIAGNOSIS — Z23 FLU VACCINE NEED: ICD-10-CM

## 2020-12-28 DIAGNOSIS — R73.03 PREDIABETES: Chronic | ICD-10-CM

## 2020-12-28 DIAGNOSIS — Z00.00 WELL ADULT EXAM: Primary | ICD-10-CM

## 2020-12-28 DIAGNOSIS — G63 NEUROPATHY DUE TO MEDICAL CONDITION: ICD-10-CM

## 2020-12-28 DIAGNOSIS — Z79.899 ENCOUNTER FOR LONG-TERM (CURRENT) USE OF MEDICATIONS: ICD-10-CM

## 2020-12-28 LAB
CHOLEST SERPL-MCNC: 202 MG/DL (ref 120–199)
CHOLEST/HDLC SERPL: 4.8 {RATIO} (ref 2–5)
ESTIMATED AVG GLUCOSE: 134 MG/DL (ref 68–131)
HBA1C MFR BLD HPLC: 6.3 % (ref 4–5.6)
HDLC SERPL-MCNC: 42 MG/DL (ref 40–75)
HDLC SERPL: 20.8 % (ref 20–50)
LDLC SERPL CALC-MCNC: 139.6 MG/DL (ref 63–159)
NONHDLC SERPL-MCNC: 160 MG/DL
TRIGL SERPL-MCNC: 102 MG/DL (ref 30–150)

## 2020-12-28 PROCEDURE — 1126F AMNT PAIN NOTED NONE PRSNT: CPT | Mod: S$GLB,,, | Performed by: FAMILY MEDICINE

## 2020-12-28 PROCEDURE — 99213 OFFICE O/P EST LOW 20 MIN: CPT | Mod: 25,S$GLB,, | Performed by: FAMILY MEDICINE

## 2020-12-28 PROCEDURE — 3078F PR MOST RECENT DIASTOLIC BLOOD PRESSURE < 80 MM HG: ICD-10-PCS | Mod: S$GLB,,, | Performed by: FAMILY MEDICINE

## 2020-12-28 PROCEDURE — 3008F PR BODY MASS INDEX (BMI) DOCUMENTED: ICD-10-PCS | Mod: S$GLB,,, | Performed by: FAMILY MEDICINE

## 2020-12-28 PROCEDURE — 36415 COLL VENOUS BLD VENIPUNCTURE: CPT | Mod: PO

## 2020-12-28 PROCEDURE — G0008 FLU VACCINE - QUADRIVALENT - ADJUVANTED: ICD-10-PCS | Mod: S$GLB,,, | Performed by: FAMILY MEDICINE

## 2020-12-28 PROCEDURE — 90694 FLU VACCINE - QUADRIVALENT - ADJUVANTED: ICD-10-PCS | Mod: S$GLB,,, | Performed by: FAMILY MEDICINE

## 2020-12-28 PROCEDURE — 99397 PER PM REEVAL EST PAT 65+ YR: CPT | Mod: 25,S$GLB,, | Performed by: FAMILY MEDICINE

## 2020-12-28 PROCEDURE — 3288F FALL RISK ASSESSMENT DOCD: CPT | Mod: S$GLB,,, | Performed by: FAMILY MEDICINE

## 2020-12-28 PROCEDURE — 1126F PR PAIN SEVERITY QUANTIFIED, NO PAIN PRESENT: ICD-10-PCS | Mod: S$GLB,,, | Performed by: FAMILY MEDICINE

## 2020-12-28 PROCEDURE — 1159F PR MEDICATION LIST DOCUMENTED IN MEDICAL RECORD: ICD-10-PCS | Mod: S$GLB,,, | Performed by: FAMILY MEDICINE

## 2020-12-28 PROCEDURE — 90732 PPSV23 VACC 2 YRS+ SUBQ/IM: CPT | Mod: S$GLB,,, | Performed by: FAMILY MEDICINE

## 2020-12-28 PROCEDURE — G0009 PNEUMOCOCCAL POLYSACCHARIDE VACCINE 23-VALENT =>2YO SQ IM: ICD-10-PCS | Mod: S$GLB,,, | Performed by: FAMILY MEDICINE

## 2020-12-28 PROCEDURE — 99999 PR PBB SHADOW E&M-EST. PATIENT-LVL IV: CPT | Mod: PBBFAC,,, | Performed by: FAMILY MEDICINE

## 2020-12-28 PROCEDURE — 90694 VACC AIIV4 NO PRSRV 0.5ML IM: CPT | Mod: S$GLB,,, | Performed by: FAMILY MEDICINE

## 2020-12-28 PROCEDURE — 80061 LIPID PANEL: CPT

## 2020-12-28 PROCEDURE — 1159F MED LIST DOCD IN RCRD: CPT | Mod: S$GLB,,, | Performed by: FAMILY MEDICINE

## 2020-12-28 PROCEDURE — G0008 ADMIN INFLUENZA VIRUS VAC: HCPCS | Mod: S$GLB,,, | Performed by: FAMILY MEDICINE

## 2020-12-28 PROCEDURE — 3074F PR MOST RECENT SYSTOLIC BLOOD PRESSURE < 130 MM HG: ICD-10-PCS | Mod: S$GLB,,, | Performed by: FAMILY MEDICINE

## 2020-12-28 PROCEDURE — 99397 PR PREVENTIVE VISIT,EST,65 & OVER: ICD-10-PCS | Mod: 25,S$GLB,, | Performed by: FAMILY MEDICINE

## 2020-12-28 PROCEDURE — 1101F PR PT FALLS ASSESS DOC 0-1 FALLS W/OUT INJ PAST YR: ICD-10-PCS | Mod: S$GLB,,, | Performed by: FAMILY MEDICINE

## 2020-12-28 PROCEDURE — 90732 PNEUMOCOCCAL POLYSACCHARIDE VACCINE 23-VALENT =>2YO SQ IM: ICD-10-PCS | Mod: S$GLB,,, | Performed by: FAMILY MEDICINE

## 2020-12-28 PROCEDURE — 99213 PR OFFICE/OUTPT VISIT, EST, LEVL III, 20-29 MIN: ICD-10-PCS | Mod: 25,S$GLB,, | Performed by: FAMILY MEDICINE

## 2020-12-28 PROCEDURE — G0009 ADMIN PNEUMOCOCCAL VACCINE: HCPCS | Mod: S$GLB,,, | Performed by: FAMILY MEDICINE

## 2020-12-28 PROCEDURE — 1101F PT FALLS ASSESS-DOCD LE1/YR: CPT | Mod: S$GLB,,, | Performed by: FAMILY MEDICINE

## 2020-12-28 PROCEDURE — 99999 PR PBB SHADOW E&M-EST. PATIENT-LVL IV: ICD-10-PCS | Mod: PBBFAC,,, | Performed by: FAMILY MEDICINE

## 2020-12-28 PROCEDURE — 3288F PR FALLS RISK ASSESSMENT DOCUMENTED: ICD-10-PCS | Mod: S$GLB,,, | Performed by: FAMILY MEDICINE

## 2020-12-28 PROCEDURE — 3074F SYST BP LT 130 MM HG: CPT | Mod: S$GLB,,, | Performed by: FAMILY MEDICINE

## 2020-12-28 PROCEDURE — 3078F DIAST BP <80 MM HG: CPT | Mod: S$GLB,,, | Performed by: FAMILY MEDICINE

## 2020-12-28 PROCEDURE — 3008F BODY MASS INDEX DOCD: CPT | Mod: S$GLB,,, | Performed by: FAMILY MEDICINE

## 2020-12-28 PROCEDURE — 83036 HEMOGLOBIN GLYCOSYLATED A1C: CPT

## 2020-12-28 RX ORDER — ATORVASTATIN CALCIUM 40 MG/1
TABLET, FILM COATED ORAL
Qty: 90 TABLET | Refills: 4 | Status: SHIPPED | OUTPATIENT
Start: 2020-12-28 | End: 2020-12-29

## 2020-12-28 RX ORDER — GABAPENTIN 300 MG/1
300 CAPSULE ORAL 3 TIMES DAILY
Qty: 270 CAPSULE | Refills: 4 | Status: SHIPPED | OUTPATIENT
Start: 2020-12-28 | End: 2021-06-28 | Stop reason: SDUPTHER

## 2020-12-28 RX ORDER — CHLORTHALIDONE 25 MG/1
25 TABLET ORAL DAILY
Qty: 90 TABLET | Refills: 4 | Status: SHIPPED | OUTPATIENT
Start: 2020-12-28 | End: 2021-12-20 | Stop reason: SDUPTHER

## 2020-12-28 RX ORDER — POTASSIUM CHLORIDE 750 MG/1
10 CAPSULE, EXTENDED RELEASE ORAL DAILY
Qty: 90 CAPSULE | Refills: 4 | Status: SHIPPED | OUTPATIENT
Start: 2020-12-28 | End: 2022-02-11 | Stop reason: SDUPTHER

## 2020-12-28 RX ORDER — ASPIRIN 81 MG/1
TABLET ORAL
Qty: 90 TABLET | Refills: 4 | Status: SHIPPED | OUTPATIENT
Start: 2020-12-28

## 2020-12-28 RX ORDER — AMLODIPINE BESYLATE 10 MG/1
TABLET ORAL
Qty: 90 TABLET | Refills: 4 | Status: SHIPPED | OUTPATIENT
Start: 2020-12-28 | End: 2021-12-20 | Stop reason: SDUPTHER

## 2020-12-28 RX ORDER — FAMOTIDINE 20 MG/1
TABLET, FILM COATED ORAL
Qty: 90 TABLET | Refills: 4 | Status: SHIPPED | OUTPATIENT
Start: 2020-12-28 | End: 2021-06-28 | Stop reason: SDUPTHER

## 2020-12-28 NOTE — PATIENT INSTRUCTIONS
Follow up in about 1 year (around 12/28/2021), or if symptoms worsen or fail to improve, for Annual Wellness Exam, 6month .     If no improvement in symptoms or symptoms worsen, please be advised to call MD, follow-up at clinic and/or go to ER if becomes severe.    Landon Smith M.D.        We Offer TELEHEALTH & Same Day Appointments!   Book your Telehealth appointment with me through my nurse or   Clinic appointments on [facility]!    [address] [place] [address]    Office: [phone]   FAX: [phone]    Check out my Facebook Page and Follow Me at: [email or web address]    Check out my website at [employer] by clicking on: [email or web address]    To Schedule appointments online, go to [email or web address]: [email or web address]

## 2020-12-28 NOTE — LETTER
December 28, 2020      GEM Bhardwaj MD                                                                               2nd Request             [facility]  [address]  [facility] [address]  Phone: [phone] December 28, 2020     Patient: Jose R Coffman    YOB: 1950   Date of Visit: 12/28/2020       To whom it may concern:     We are seeing Jose R Coffman, SHAKIR.O.B is 1950, at [facility]. Landon Smith MD is their primary care physician. To help with our [place] maintenance records could you please send the following:     Last Colonoscopy and Pathology Result with recommendations when to repeat procedure.     Please send fax to [phone], Attention Itzel COLON LPN Care Coordination.    Thank-you in advance for your assistance. If you have any questions or concerns, please don't hesitate to contact me at [phone].     Itzel COLON LPN  Care Coordination Department  [facility]

## 2020-12-29 PROBLEM — G63 NEUROPATHY DUE TO MEDICAL CONDITION: Chronic | Status: ACTIVE | Noted: 2019-10-18

## 2020-12-29 PROBLEM — I69.354 HEMIPARESIS OF LEFT NONDOMINANT SIDE AS LATE EFFECT OF CEREBRAL INFARCTION: Chronic | Status: ACTIVE | Noted: 2019-10-18

## 2020-12-29 PROBLEM — I63.9 CEREBROVASCULAR ACCIDENT (CVA): Chronic | Status: ACTIVE | Noted: 2019-05-31

## 2020-12-29 RX ORDER — ATORVASTATIN CALCIUM 80 MG/1
80 TABLET, FILM COATED ORAL NIGHTLY
Qty: 90 TABLET | Refills: 4 | Status: SHIPPED | OUTPATIENT
Start: 2020-12-29 | End: 2021-12-20 | Stop reason: SDUPTHER

## 2020-12-29 NOTE — PROGRESS NOTES
This note is specifically for wellness visit performed today.     WELLNESS EXAM      Patient ID: Jose R Coffman is a 70 y.o. male.  has a past medical history of Hyperlipidemia, Hypertension, and Stroke.     Chief Complaint:  Encounter for wellness exam    Well Adult Physical: Patient here for a comprehensive physical exam.The patient reports chronic problems.  See other note.  Do you take any herbs or supplements that were not prescribed by a doctor? no   Are you taking calcium supplements? no      History:  None reported  UROLOGIST:  None    Date last PSA: Reviewed  Lab Results   Component Value Date    PSA 0.81 12/28/2020    PSA 0.78 12/04/2019      No history of STDs    Health Maintenance Topics with due status: Not Due       Topic Last Completion Date    Colorectal Cancer Screening 01/29/2020    PROSTATE-SPECIFIC ANTIGEN 12/28/2020    Aspirin/Antiplatelet Therapy 12/28/2020    Lipid Panel 12/28/2020    High Dose Statin 12/29/2020        ==============================================  History reviewed.     There are no preventive care reminders to display for this patient.    Past Medical History:  Past Medical History:   Diagnosis Date    Hyperlipidemia     Hypertension     Stroke      History reviewed. No pertinent surgical history.  Review of patient's allergies indicates:   Allergen Reactions    Lisinopril      Current Outpatient Medications on File Prior to Visit   Medication Sig Dispense Refill    [DISCONTINUED] amLODIPine (NORVASC) 10 MG tablet Take 1 tablet (10 mg total) by mouth daily 90 tablet 1    [DISCONTINUED] chlorthalidone (HYGROTEN) 25 MG Tab Take 1 tablet (25 mg total) by mouth once daily. 90 tablet 1     No current facility-administered medications on file prior to visit.      Social History     Socioeconomic History    Marital status:      Spouse name: Not on file    Number of children: Not on file    Years of education: Not on file    Highest education level: Not on file    Occupational History    Not on file   Social Needs    Financial resource strain: Not on file    Food insecurity     Worry: Not on file     Inability: Not on file    Transportation needs     Medical: Not on file     Non-medical: Not on file   Tobacco Use    Smoking status: Never Smoker   Substance and Sexual Activity    Alcohol use: Not on file    Drug use: Not on file    Sexual activity: Not on file   Lifestyle    Physical activity     Days per week: Not on file     Minutes per session: Not on file    Stress: Not on file   Relationships    Social connections     Talks on phone: Not on file     Gets together: Not on file     Attends Synagogue service: Not on file     Active member of club or organization: Not on file     Attends meetings of clubs or organizations: Not on file     Relationship status: Not on file   Other Topics Concern    Not on file   Social History Narrative    Not on file     Family History   Problem Relation Age of Onset    Cancer Mother         throat    Heart disease Father     Heart disease Brother     Cancer Brother        Vitals:    12/28/20 0813   BP: 126/74   Pulse: 72   Temp: 98.2 °F (36.8 °C)      Body mass index is 26.19 kg/m².     Review of Systems   Constitutional: Negative for chills, fatigue, fever and unexpected weight change.   HENT: Negative for ear pain and sore throat.    Eyes: Negative for redness and visual disturbance.   Respiratory: Negative for cough and shortness of breath.    Cardiovascular: Negative for chest pain and palpitations.   Gastrointestinal: Negative for nausea and vomiting.   Endocrine: Negative for cold intolerance and heat intolerance.   Genitourinary: Negative for difficulty urinating and hematuria.   Musculoskeletal: Negative for arthralgias and myalgias.   Skin: Negative for rash and wound.   Allergic/Immunologic: Negative for environmental allergies and food allergies.   Neurological: Positive for weakness (Still with left upper and  lower extremity weakness.  Worse in the left hand.). Negative for headaches.        + neuropathy   Hematological: Negative for adenopathy. Does not bruise/bleed easily.   Psychiatric/Behavioral: Negative for sleep disturbance. The patient is not nervous/anxious.           Objective:      Physical Exam  Vitals signs and nursing note reviewed.   Constitutional:       General: He is not in acute distress.     Appearance: He is well-developed.   HENT:      Head: Normocephalic and atraumatic.   Eyes:      Pupils: Pupils are equal, round, and reactive to light.   Neck:      Musculoskeletal: Normal range of motion and neck supple.   Cardiovascular:      Rate and Rhythm: Normal rate and regular rhythm.      Heart sounds: Murmur present.   Pulmonary:      Effort: Pulmonary effort is normal. No respiratory distress.      Breath sounds: Normal breath sounds. No wheezing.   Abdominal:      General: Bowel sounds are normal. There is no distension.      Palpations: Abdomen is soft.   Musculoskeletal: Normal range of motion.      Right foot: Normal range of motion. No deformity.      Left foot: Normal range of motion. No deformity.   Feet:      Right foot:      Skin integrity: Callus and dry skin present. No ulcer, blister, skin breakdown, erythema or warmth.      Left foot:      Skin integrity: Callus and dry skin present. No ulcer, blister, skin breakdown, erythema or warmth.   Skin:     General: Skin is warm and dry.      Capillary Refill: Capillary refill takes less than 2 seconds.   Neurological:      Mental Status: He is alert and oriented to person, place, and time.      Cranial Nerves: No cranial nerve deficit.      Sensory: No sensory deficit.      Motor: No abnormal muscle tone or seizure activity.      Coordination: Coordination normal.      Gait: Gait normal.      Comments: Chronic mild Left upper extremity weakness No slurring of speech.   Psychiatric:         Behavior: Behavior normal.          Assessment / Plan:       1.  Routine health exam-patient here for annual wellness exam.  Labs ordered.  Health maintenance was reviewed and ordered.    Complete history and physical was completed today.  Complete and thorough medication reconciliation was performed.  Discussed risks and benefits of medications.  Advised patient on orders and health maintenance.  We discussed old records and old labs if available.  Will request any records not available through epic.  Continue current medications listed on your summary sheet.    All questions were answered. Patient had no further concerns. Advised of diagnoses and plan. Follow up as planned or return sooner if symptoms persist or worsen.     Orders Placed This Encounter   Procedures    Influenza (FLUAD) - Quadrivalent (Adjuvanted) *Preferred* (65+) (PF)    (In Office Administered) Pneumococcal Polysaccharide Vaccine (23 Valent) (SQ/IM)    PSA, Screening     Standing Status:   Future     Number of Occurrences:   1     Standing Expiration Date:   2/26/2022       Medications Ordered This Encounter   Medications    amLODIPine (NORVASC) 10 MG tablet     Sig: TAKE 1 TABLET DAILY     Dispense:  90 tablet     Refill:  4     .    aspirin (ECOTRIN) 81 MG EC tablet     Sig: Chew 1 tablet (81 mg total) by mouth daily     Dispense:  90 tablet     Refill:  4    atorvastatin (LIPITOR) 80 MG tablet     Sig: Take 1 tablet (80 mg total) by mouth every evening. TAKE 1 TABLET DAILY     Dispense:  90 tablet     Refill:  4    chlorthalidone (HYGROTEN) 25 MG Tab     Sig: Take 1 tablet (25 mg total) by mouth once daily.     Dispense:  90 tablet     Refill:  4     .    famotidine (PEPCID) 20 MG tablet     Sig: Take 1 tablet (20 mg total) by mouth 2 (two) times daily     Dispense:  90 tablet     Refill:  4    gabapentin (NEURONTIN) 300 MG capsule     Sig: Take 1 capsule (300 mg total) by mouth 3 (three) times daily.     Dispense:  270 capsule     Refill:  4    potassium chloride (MICRO-K) 10 MEQ CpSR      Sig: Take 1 capsule (10 mEq total) by mouth once daily.     Dispense:  90 capsule     Refill:  4        Landon Smith MD

## 2020-12-29 NOTE — ASSESSMENT & PLAN NOTE
Increase Lipitor to 80 mg daily.Counseled on hyperlipidemia disease course, healthy diet and increased need for exercise.  Please be advised of the risk of cardiovascular disease, increase stroke and heart attack risk with uncontrolled/untreated hyperlipidemia.     Patient voiced understanding and understood the treatment plan. All questions were answered.

## 2020-12-29 NOTE — ASSESSMENT & PLAN NOTE
Increase potassium supplementation.  Increase compliance.  Recheck potassium level.    Signs and symptoms of hypokalemia were discussed with the patient.  ER precautions.

## 2020-12-29 NOTE — ASSESSMENT & PLAN NOTE
"December 2020:  Patient reports that he has done well with therapy.  He is left with very minimal residual deficits.  Patient has been compliant with his medications as prescribed.  Patient does follow with Neurology.  Dr. Dowd.  Needs better LDL goal.    Update October 2019:  Blood pressure is controlled today on hydralazine and amlodipine.  Patient wants to stop hydralazine due to intermittent compliance from 3 times a day.  Patient reports that he cannot take lisinopril due to causing his stroke " and giving him a cough.  I will changes hydralazine to chlorthalidone and recheck his blood pressure in two weeks.  Patient will follow up with me in four weeks.  Patient will notify me blood pressure is not controlled at home.  Patient reports that his wife takes his blood pressure.    Prediabetes:  Patient is due for hemoglobin A1c.  Patient cannot take metformin due to GI side effects.  Switching patient to Januvia.    Patient remains on statin and aspirin.  Checking lipid panel.    ===================================================  Initial plan:  Reviewed records from previous hospitalization.  Continue physical therapy.  Continue medications for blood pressure as well as aspirin statin and checking diabetes status.  "

## 2020-12-29 NOTE — ASSESSMENT & PLAN NOTE
Patient with persistent low potassium.  Increase supplementation and compliance with supplement.  Continue all other medications.  Refills today.Complete history and physical was completed today.  Complete and thorough medication reconciliation was performed.  Discussed risks and benefits of medications.  Advised patient on orders and health maintenance.  We discussed old records and old labs if available.  Will request any records not available through epic.  Continue current medications listed on your summary sheet.

## 2021-05-23 DIAGNOSIS — R73.03 PREDIABETES: Chronic | ICD-10-CM

## 2021-05-24 RX ORDER — METFORMIN HYDROCHLORIDE 500 MG/1
TABLET ORAL
Qty: 90 TABLET | Refills: 3 | OUTPATIENT
Start: 2021-05-24

## 2021-06-28 ENCOUNTER — OFFICE VISIT (OUTPATIENT)
Dept: FAMILY MEDICINE | Facility: CLINIC | Age: 71
End: 2021-06-28
Payer: MEDICARE

## 2021-06-28 VITALS
WEIGHT: 155.13 LBS | RESPIRATION RATE: 14 BRPM | HEART RATE: 80 BPM | SYSTOLIC BLOOD PRESSURE: 124 MMHG | HEIGHT: 65 IN | DIASTOLIC BLOOD PRESSURE: 79 MMHG | TEMPERATURE: 97 F | OXYGEN SATURATION: 97 % | BODY MASS INDEX: 25.85 KG/M2

## 2021-06-28 DIAGNOSIS — Z12.5 ENCOUNTER FOR PROSTATE CANCER SCREENING: ICD-10-CM

## 2021-06-28 DIAGNOSIS — Z79.899 ENCOUNTER FOR LONG-TERM (CURRENT) USE OF MEDICATIONS: ICD-10-CM

## 2021-06-28 DIAGNOSIS — M25.512 CHRONIC LEFT SHOULDER PAIN: Primary | ICD-10-CM

## 2021-06-28 DIAGNOSIS — G63 NEUROPATHY DUE TO MEDICAL CONDITION: ICD-10-CM

## 2021-06-28 DIAGNOSIS — G89.29 CHRONIC LEFT SHOULDER PAIN: Primary | ICD-10-CM

## 2021-06-28 DIAGNOSIS — I69.354 HEMIPARESIS OF LEFT NONDOMINANT SIDE AS LATE EFFECT OF CEREBRAL INFARCTION: ICD-10-CM

## 2021-06-28 PROCEDURE — 3008F BODY MASS INDEX DOCD: CPT | Mod: S$GLB,,, | Performed by: FAMILY MEDICINE

## 2021-06-28 PROCEDURE — 1125F PR PAIN SEVERITY QUANTIFIED, PAIN PRESENT: ICD-10-PCS | Mod: S$GLB,,, | Performed by: FAMILY MEDICINE

## 2021-06-28 PROCEDURE — 3288F FALL RISK ASSESSMENT DOCD: CPT | Mod: S$GLB,,, | Performed by: FAMILY MEDICINE

## 2021-06-28 PROCEDURE — 99999 PR PBB SHADOW E&M-EST. PATIENT-LVL IV: ICD-10-PCS | Mod: PBBFAC,,, | Performed by: FAMILY MEDICINE

## 2021-06-28 PROCEDURE — 1101F PT FALLS ASSESS-DOCD LE1/YR: CPT | Mod: S$GLB,,, | Performed by: FAMILY MEDICINE

## 2021-06-28 PROCEDURE — 99999 PR PBB SHADOW E&M-EST. PATIENT-LVL IV: CPT | Mod: PBBFAC,,, | Performed by: FAMILY MEDICINE

## 2021-06-28 PROCEDURE — 3288F PR FALLS RISK ASSESSMENT DOCUMENTED: ICD-10-PCS | Mod: S$GLB,,, | Performed by: FAMILY MEDICINE

## 2021-06-28 PROCEDURE — 1125F AMNT PAIN NOTED PAIN PRSNT: CPT | Mod: S$GLB,,, | Performed by: FAMILY MEDICINE

## 2021-06-28 PROCEDURE — 1159F PR MEDICATION LIST DOCUMENTED IN MEDICAL RECORD: ICD-10-PCS | Mod: S$GLB,,, | Performed by: FAMILY MEDICINE

## 2021-06-28 PROCEDURE — 99214 OFFICE O/P EST MOD 30 MIN: CPT | Mod: S$GLB,,, | Performed by: FAMILY MEDICINE

## 2021-06-28 PROCEDURE — 99214 PR OFFICE/OUTPT VISIT, EST, LEVL IV, 30-39 MIN: ICD-10-PCS | Mod: S$GLB,,, | Performed by: FAMILY MEDICINE

## 2021-06-28 PROCEDURE — 1159F MED LIST DOCD IN RCRD: CPT | Mod: S$GLB,,, | Performed by: FAMILY MEDICINE

## 2021-06-28 PROCEDURE — 3008F PR BODY MASS INDEX (BMI) DOCUMENTED: ICD-10-PCS | Mod: S$GLB,,, | Performed by: FAMILY MEDICINE

## 2021-06-28 PROCEDURE — 1101F PR PT FALLS ASSESS DOC 0-1 FALLS W/OUT INJ PAST YR: ICD-10-PCS | Mod: S$GLB,,, | Performed by: FAMILY MEDICINE

## 2021-06-28 RX ORDER — METFORMIN HYDROCHLORIDE 500 MG/1
500 TABLET ORAL DAILY
COMMUNITY
Start: 2021-03-18 | End: 2021-12-20

## 2021-06-28 RX ORDER — FAMOTIDINE 20 MG/1
TABLET, FILM COATED ORAL
Qty: 180 TABLET | Refills: 4 | Status: SHIPPED | OUTPATIENT
Start: 2021-06-28 | End: 2022-08-18 | Stop reason: SDUPTHER

## 2021-06-28 RX ORDER — GABAPENTIN 300 MG/1
300 CAPSULE ORAL 3 TIMES DAILY
Qty: 270 CAPSULE | Refills: 4 | Status: SHIPPED | OUTPATIENT
Start: 2021-06-28 | End: 2022-09-23 | Stop reason: SDUPTHER

## 2021-12-13 ENCOUNTER — LAB VISIT (OUTPATIENT)
Dept: LAB | Facility: HOSPITAL | Age: 71
End: 2021-12-13
Attending: FAMILY MEDICINE
Payer: MEDICARE

## 2021-12-13 DIAGNOSIS — Z79.899 ENCOUNTER FOR LONG-TERM (CURRENT) USE OF MEDICATIONS: Chronic | ICD-10-CM

## 2021-12-13 DIAGNOSIS — R73.03 PREDIABETES: Chronic | ICD-10-CM

## 2021-12-13 DIAGNOSIS — I69.354 HEMIPARESIS OF LEFT NONDOMINANT SIDE AS LATE EFFECT OF CEREBRAL INFARCTION: ICD-10-CM

## 2021-12-13 DIAGNOSIS — E78.2 MIXED HYPERLIPIDEMIA: Chronic | ICD-10-CM

## 2021-12-13 DIAGNOSIS — G63 NEUROPATHY DUE TO MEDICAL CONDITION: ICD-10-CM

## 2021-12-13 LAB
ALBUMIN SERPL BCP-MCNC: 3.8 G/DL (ref 3.5–5.2)
ALP SERPL-CCNC: 96 U/L (ref 55–135)
ALT SERPL W/O P-5'-P-CCNC: 18 U/L (ref 10–44)
ANION GAP SERPL CALC-SCNC: 13 MMOL/L (ref 8–16)
AST SERPL-CCNC: 15 U/L (ref 10–40)
BILIRUB SERPL-MCNC: 0.4 MG/DL (ref 0.1–1)
BUN SERPL-MCNC: 23 MG/DL (ref 8–23)
CALCIUM SERPL-MCNC: 10 MG/DL (ref 8.7–10.5)
CHLORIDE SERPL-SCNC: 101 MMOL/L (ref 95–110)
CHOLEST SERPL-MCNC: 174 MG/DL (ref 120–199)
CHOLEST/HDLC SERPL: 4.7 {RATIO} (ref 2–5)
CO2 SERPL-SCNC: 26 MMOL/L (ref 23–29)
CREAT SERPL-MCNC: 1.3 MG/DL (ref 0.5–1.4)
ERYTHROCYTE [DISTWIDTH] IN BLOOD BY AUTOMATED COUNT: 13.2 % (ref 11.5–14.5)
EST. GFR  (AFRICAN AMERICAN): >60 ML/MIN/1.73 M^2
EST. GFR  (NON AFRICAN AMERICAN): 54.9 ML/MIN/1.73 M^2
ESTIMATED AVG GLUCOSE: 163 MG/DL (ref 68–131)
GLUCOSE SERPL-MCNC: 101 MG/DL (ref 70–110)
HBA1C MFR BLD: 7.3 % (ref 4–5.6)
HCT VFR BLD AUTO: 44 % (ref 40–54)
HDLC SERPL-MCNC: 37 MG/DL (ref 40–75)
HDLC SERPL: 21.3 % (ref 20–50)
HGB BLD-MCNC: 13.9 G/DL (ref 14–18)
LDLC SERPL CALC-MCNC: 115.6 MG/DL (ref 63–159)
MCH RBC QN AUTO: 27.6 PG (ref 27–31)
MCHC RBC AUTO-ENTMCNC: 31.6 G/DL (ref 32–36)
MCV RBC AUTO: 88 FL (ref 82–98)
NONHDLC SERPL-MCNC: 137 MG/DL
PLATELET # BLD AUTO: 289 K/UL (ref 150–450)
PMV BLD AUTO: 10.9 FL (ref 9.2–12.9)
POTASSIUM SERPL-SCNC: 3.1 MMOL/L (ref 3.5–5.1)
PROT SERPL-MCNC: 8.4 G/DL (ref 6–8.4)
RBC # BLD AUTO: 5.03 M/UL (ref 4.6–6.2)
SODIUM SERPL-SCNC: 140 MMOL/L (ref 136–145)
TRIGL SERPL-MCNC: 107 MG/DL (ref 30–150)
TSH SERPL DL<=0.005 MIU/L-ACNC: 1.92 UIU/ML (ref 0.4–4)
WBC # BLD AUTO: 6.17 K/UL (ref 3.9–12.7)

## 2021-12-13 PROCEDURE — 80053 COMPREHEN METABOLIC PANEL: CPT | Performed by: FAMILY MEDICINE

## 2021-12-13 PROCEDURE — 85027 COMPLETE CBC AUTOMATED: CPT | Performed by: FAMILY MEDICINE

## 2021-12-13 PROCEDURE — 36415 COLL VENOUS BLD VENIPUNCTURE: CPT | Mod: PO | Performed by: FAMILY MEDICINE

## 2021-12-13 PROCEDURE — 80061 LIPID PANEL: CPT | Performed by: FAMILY MEDICINE

## 2021-12-13 PROCEDURE — 84443 ASSAY THYROID STIM HORMONE: CPT | Performed by: FAMILY MEDICINE

## 2021-12-13 PROCEDURE — 83036 HEMOGLOBIN GLYCOSYLATED A1C: CPT | Performed by: FAMILY MEDICINE

## 2021-12-20 ENCOUNTER — OFFICE VISIT (OUTPATIENT)
Dept: FAMILY MEDICINE | Facility: CLINIC | Age: 71
End: 2021-12-20
Payer: MEDICARE

## 2021-12-20 VITALS
SYSTOLIC BLOOD PRESSURE: 138 MMHG | TEMPERATURE: 98 F | OXYGEN SATURATION: 98 % | HEART RATE: 78 BPM | BODY MASS INDEX: 28.7 KG/M2 | DIASTOLIC BLOOD PRESSURE: 82 MMHG | HEIGHT: 63 IN | WEIGHT: 162 LBS

## 2021-12-20 DIAGNOSIS — E11.59 HYPERTENSION ASSOCIATED WITH DIABETES: ICD-10-CM

## 2021-12-20 DIAGNOSIS — E78.5 HYPERLIPIDEMIA ASSOCIATED WITH TYPE 2 DIABETES MELLITUS: ICD-10-CM

## 2021-12-20 DIAGNOSIS — N18.31 STAGE 3A CHRONIC KIDNEY DISEASE: ICD-10-CM

## 2021-12-20 DIAGNOSIS — G31.9 DEGENERATIVE DISEASE OF NERVOUS SYSTEM, UNSPECIFIED: ICD-10-CM

## 2021-12-20 DIAGNOSIS — Z12.5 ENCOUNTER FOR PROSTATE CANCER SCREENING: ICD-10-CM

## 2021-12-20 DIAGNOSIS — Z79.899 ENCOUNTER FOR LONG-TERM (CURRENT) USE OF MEDICATIONS: ICD-10-CM

## 2021-12-20 DIAGNOSIS — E11.69 HYPERLIPIDEMIA ASSOCIATED WITH TYPE 2 DIABETES MELLITUS: ICD-10-CM

## 2021-12-20 DIAGNOSIS — I15.2 HYPERTENSION ASSOCIATED WITH DIABETES: ICD-10-CM

## 2021-12-20 DIAGNOSIS — E11.65 TYPE 2 DIABETES MELLITUS WITH HYPERGLYCEMIA, WITHOUT LONG-TERM CURRENT USE OF INSULIN: Primary | ICD-10-CM

## 2021-12-20 PROCEDURE — 99213 PR OFFICE/OUTPT VISIT, EST, LEVL III, 20-29 MIN: ICD-10-PCS | Mod: S$GLB,,, | Performed by: FAMILY MEDICINE

## 2021-12-20 PROCEDURE — 99999 PR PBB SHADOW E&M-EST. PATIENT-LVL IV: ICD-10-PCS | Mod: PBBFAC,,, | Performed by: FAMILY MEDICINE

## 2021-12-20 PROCEDURE — 99213 OFFICE O/P EST LOW 20 MIN: CPT | Mod: S$GLB,,, | Performed by: FAMILY MEDICINE

## 2021-12-20 PROCEDURE — 99999 PR PBB SHADOW E&M-EST. PATIENT-LVL IV: CPT | Mod: PBBFAC,,, | Performed by: FAMILY MEDICINE

## 2021-12-20 RX ORDER — METFORMIN HYDROCHLORIDE 500 MG/1
500 TABLET, EXTENDED RELEASE ORAL
Qty: 90 TABLET | Refills: 3 | Status: SHIPPED | OUTPATIENT
Start: 2021-12-20 | End: 2022-11-28

## 2021-12-20 RX ORDER — CHLORTHALIDONE 25 MG/1
25 TABLET ORAL DAILY
Qty: 90 TABLET | Refills: 4 | Status: SHIPPED | OUTPATIENT
Start: 2021-12-20 | End: 2023-01-03

## 2021-12-20 RX ORDER — ATORVASTATIN CALCIUM 80 MG/1
80 TABLET, FILM COATED ORAL NIGHTLY
Qty: 90 TABLET | Refills: 4 | Status: SHIPPED | OUTPATIENT
Start: 2021-12-20 | End: 2023-01-10 | Stop reason: SDUPTHER

## 2021-12-20 RX ORDER — AMLODIPINE BESYLATE 10 MG/1
TABLET ORAL
Qty: 90 TABLET | Refills: 4 | Status: SHIPPED | OUTPATIENT
Start: 2021-12-20 | End: 2023-01-03

## 2022-02-22 ENCOUNTER — NURSE TRIAGE (OUTPATIENT)
Dept: ADMINISTRATIVE | Facility: CLINIC | Age: 72
End: 2022-02-22
Payer: MEDICARE

## 2022-02-22 ENCOUNTER — OFFICE VISIT (OUTPATIENT)
Dept: FAMILY MEDICINE | Facility: CLINIC | Age: 72
End: 2022-02-22
Payer: MEDICARE

## 2022-02-22 ENCOUNTER — TELEPHONE (OUTPATIENT)
Dept: FAMILY MEDICINE | Facility: CLINIC | Age: 72
End: 2022-02-22
Payer: MEDICARE

## 2022-02-22 ENCOUNTER — HOSPITAL ENCOUNTER (OUTPATIENT)
Dept: RADIOLOGY | Facility: HOSPITAL | Age: 72
Discharge: HOME | End: 2022-02-22
Attending: NURSE PRACTITIONER
Payer: MEDICARE

## 2022-02-22 VITALS
DIASTOLIC BLOOD PRESSURE: 74 MMHG | OXYGEN SATURATION: 99 % | BODY MASS INDEX: 28.56 KG/M2 | WEIGHT: 161.19 LBS | TEMPERATURE: 98 F | SYSTOLIC BLOOD PRESSURE: 130 MMHG | HEIGHT: 63 IN | HEART RATE: 72 BPM

## 2022-02-22 DIAGNOSIS — R42 DIZZINESS AND GIDDINESS: ICD-10-CM

## 2022-02-22 DIAGNOSIS — R09.89 OTHER SPECIFIED SYMPTOMS AND SIGNS INVOLVING THE CIRCULATORY AND RESPIRATORY SYSTEMS: ICD-10-CM

## 2022-02-22 DIAGNOSIS — Z86.73 HISTORY OF CVA (CEREBROVASCULAR ACCIDENT): ICD-10-CM

## 2022-02-22 DIAGNOSIS — R42 DIZZINESS AND GIDDINESS: Primary | ICD-10-CM

## 2022-02-22 DIAGNOSIS — E87.6 HYPOKALEMIA: ICD-10-CM

## 2022-02-22 DIAGNOSIS — H61.22 IMPACTED CERUMEN, LEFT EAR: ICD-10-CM

## 2022-02-22 PROCEDURE — 1126F PR PAIN SEVERITY QUANTIFIED, NO PAIN PRESENT: ICD-10-PCS | Mod: CPTII,S$GLB,, | Performed by: NURSE PRACTITIONER

## 2022-02-22 PROCEDURE — 3075F PR MOST RECENT SYSTOLIC BLOOD PRESS GE 130-139MM HG: ICD-10-PCS | Mod: CPTII,S$GLB,, | Performed by: NURSE PRACTITIONER

## 2022-02-22 PROCEDURE — 1126F AMNT PAIN NOTED NONE PRSNT: CPT | Mod: CPTII,S$GLB,, | Performed by: NURSE PRACTITIONER

## 2022-02-22 PROCEDURE — 93010 ELECTROCARDIOGRAM REPORT: CPT | Mod: S$GLB,,, | Performed by: INTERNAL MEDICINE

## 2022-02-22 PROCEDURE — 93880 EXTRACRANIAL BILAT STUDY: CPT | Mod: 26,,, | Performed by: RADIOLOGY

## 2022-02-22 PROCEDURE — 3288F PR FALLS RISK ASSESSMENT DOCUMENTED: ICD-10-PCS | Mod: CPTII,S$GLB,, | Performed by: NURSE PRACTITIONER

## 2022-02-22 PROCEDURE — 3075F SYST BP GE 130 - 139MM HG: CPT | Mod: CPTII,S$GLB,, | Performed by: NURSE PRACTITIONER

## 2022-02-22 PROCEDURE — 1101F PT FALLS ASSESS-DOCD LE1/YR: CPT | Mod: CPTII,S$GLB,, | Performed by: NURSE PRACTITIONER

## 2022-02-22 PROCEDURE — 1160F PR REVIEW ALL MEDS BY PRESCRIBER/CLIN PHARMACIST DOCUMENTED: ICD-10-PCS | Mod: CPTII,S$GLB,, | Performed by: NURSE PRACTITIONER

## 2022-02-22 PROCEDURE — 3288F FALL RISK ASSESSMENT DOCD: CPT | Mod: CPTII,S$GLB,, | Performed by: NURSE PRACTITIONER

## 2022-02-22 PROCEDURE — 3078F PR MOST RECENT DIASTOLIC BLOOD PRESSURE < 80 MM HG: ICD-10-PCS | Mod: CPTII,S$GLB,, | Performed by: NURSE PRACTITIONER

## 2022-02-22 PROCEDURE — 1159F PR MEDICATION LIST DOCUMENTED IN MEDICAL RECORD: ICD-10-PCS | Mod: CPTII,S$GLB,, | Performed by: NURSE PRACTITIONER

## 2022-02-22 PROCEDURE — 99214 PR OFFICE/OUTPT VISIT, EST, LEVL IV, 30-39 MIN: ICD-10-PCS | Mod: S$GLB,,, | Performed by: NURSE PRACTITIONER

## 2022-02-22 PROCEDURE — 93005 EKG 12-LEAD: ICD-10-PCS | Mod: S$GLB,,, | Performed by: NURSE PRACTITIONER

## 2022-02-22 PROCEDURE — 99214 OFFICE O/P EST MOD 30 MIN: CPT | Mod: S$GLB,,, | Performed by: NURSE PRACTITIONER

## 2022-02-22 PROCEDURE — 1160F RVW MEDS BY RX/DR IN RCRD: CPT | Mod: CPTII,S$GLB,, | Performed by: NURSE PRACTITIONER

## 2022-02-22 PROCEDURE — 3078F DIAST BP <80 MM HG: CPT | Mod: CPTII,S$GLB,, | Performed by: NURSE PRACTITIONER

## 2022-02-22 PROCEDURE — 93880 EXTRACRANIAL BILAT STUDY: CPT | Mod: TC,PO

## 2022-02-22 PROCEDURE — 3008F PR BODY MASS INDEX (BMI) DOCUMENTED: ICD-10-PCS | Mod: CPTII,S$GLB,, | Performed by: NURSE PRACTITIONER

## 2022-02-22 PROCEDURE — 99999 PR PBB SHADOW E&M-EST. PATIENT-LVL V: CPT | Mod: PBBFAC,,, | Performed by: NURSE PRACTITIONER

## 2022-02-22 PROCEDURE — 1101F PR PT FALLS ASSESS DOC 0-1 FALLS W/OUT INJ PAST YR: ICD-10-PCS | Mod: CPTII,S$GLB,, | Performed by: NURSE PRACTITIONER

## 2022-02-22 PROCEDURE — 1159F MED LIST DOCD IN RCRD: CPT | Mod: CPTII,S$GLB,, | Performed by: NURSE PRACTITIONER

## 2022-02-22 PROCEDURE — 99999 PR PBB SHADOW E&M-EST. PATIENT-LVL V: ICD-10-PCS | Mod: PBBFAC,,, | Performed by: NURSE PRACTITIONER

## 2022-02-22 PROCEDURE — 3008F BODY MASS INDEX DOCD: CPT | Mod: CPTII,S$GLB,, | Performed by: NURSE PRACTITIONER

## 2022-02-22 PROCEDURE — 93880 US CAROTID BILATERAL: ICD-10-PCS | Mod: 26,,, | Performed by: RADIOLOGY

## 2022-02-22 PROCEDURE — 93010 EKG 12-LEAD: ICD-10-PCS | Mod: S$GLB,,, | Performed by: INTERNAL MEDICINE

## 2022-02-22 PROCEDURE — 93005 ELECTROCARDIOGRAM TRACING: CPT | Mod: S$GLB,,, | Performed by: NURSE PRACTITIONER

## 2022-02-22 NOTE — PROGRESS NOTES
"Subjective:       Patient ID: Jose R Coffman is a 71 y.o. male.    Chief Complaint: Dizziness (X4-5 days)    Dizziness:   Chronicity:  New  Onset:  In the past 7 days  Progression since onset:  Waxing and waning  Severity:  Moderate  Dizziness characteristics:  Height vertigo and sensation of movementno hearing loss, no ear congestion, no ear pain, no fever, no headaches, no tinnitus, no nausea, no vomiting, no diaphoresis, no aural fullness, no weakness, no visual disturbances, no light-headedness, no syncope, no palpitations, no panic, no facial weakness, no slurred speech, no numbness in extremities and no chest pain.  Aggravated by:  Lying down (Pt states, "it happens at aroung 4 or 5 in the morning.")  Risk factors:  Stroke  Treatments tried:  Body position changes  Improvements on treatment:  Mild   PMH includes: strokes.no cardiac surgery, no neurologic disease, no head trauma, no balance testing, no ear trauma, no ear surgery, no head trauma, no ear infections, no anxiety, no ear tubes, no environmental allergies, no MRI head and no CT head.  Pt has history of CVA; has not followed up with neurology as advised (7 m f/u). Hypokalemia noted on labs done 12/13/2021; advised to take potassium 10mEq BID by his PCP; pt states, "I don't take those potassium pills." US carotid done 2019 at [facility]; showed bilateral carotid atheromatous plaquing;there is less than 50% diameter reduction.   Past Medical History:   Diagnosis Date    Hyperlipidemia     Hypertension     Stroke      Social History     Socioeconomic History    Marital status:    Tobacco Use    Smoking status: Never Smoker    Smokeless tobacco: Never Used   Substance and Sexual Activity    Alcohol use: Never    Drug use: Never    Sexual activity: Not Currently     History reviewed. No pertinent surgical history.    Review of Systems   Constitutional: Negative for diaphoresis and fever.   HENT: Negative for ear pain, hearing loss and tinnitus. "    Cardiovascular: Negative for chest pain, palpitations and syncope.   Gastrointestinal: Negative for nausea and vomiting.   Allergic/Immunologic: Negative for environmental allergies.   Neurological: Positive for dizziness. Negative for weakness, light-headedness and headaches.         Objective:      Physical Exam  Vitals and nursing note reviewed.   Constitutional:       Appearance: Normal appearance.   HENT:      Head: Normocephalic.      Right Ear: Ear canal and external ear normal. There is impacted cerumen.      Left Ear: Tympanic membrane, ear canal and external ear normal.      Nose: Nose normal.      Mouth/Throat:      Mouth: Mucous membranes are moist.      Pharynx: Oropharynx is clear.   Eyes:      Extraocular Movements: Extraocular movements intact.      Conjunctiva/sclera: Conjunctivae normal.      Pupils: Pupils are equal, round, and reactive to light.   Cardiovascular:      Rate and Rhythm: Normal rate and regular rhythm.      Pulses: Normal pulses.      Heart sounds: Normal heart sounds.   Pulmonary:      Effort: Pulmonary effort is normal.      Breath sounds: Normal breath sounds.   Abdominal:      General: Bowel sounds are normal.      Palpations: Abdomen is soft.   Musculoskeletal:         General: Normal range of motion.      Cervical back: Normal range of motion and neck supple.   Skin:     General: Skin is warm and dry.      Capillary Refill: Capillary refill takes 2 to 3 seconds.   Neurological:      Mental Status: He is alert and oriented to person, place, and time.   Psychiatric:         Mood and Affect: Mood normal.         Behavior: Behavior normal.         Thought Content: Thought content normal.         Judgment: Judgment normal.         Assessment:       Problem List Items Addressed This Visit     Hypokalemia (Chronic)    Relevant Orders    Basic Metabolic Panel      Other Visit Diagnoses     Dizziness and giddiness    -  Primary    Relevant Orders    Basic Metabolic Panel    US  Carotid Bilateral    CT Head Without Contrast    IN OFFICE EKG 12-LEAD (to [place])    CBC Auto Differential    Impacted cerumen, left ear        History of CVA (cerebrovascular accident)        Relevant Orders    US Carotid Bilateral    IN OFFICE EKG 12-LEAD (to Muse)    Other specified symptoms and signs involving the circulatory and respiratory systems         Relevant Orders    US Carotid Bilateral          Plan:           Jose R was seen today for dizziness.    Diagnoses and all orders for this visit:    Dizziness and giddiness  -     Basic Metabolic Panel; Future  -     US Carotid Bilateral; Future  -     CT Head Without Contrast; Future  -     IN OFFICE EKG 12-LEAD (to Muse)  -     CBC Auto Differential; Future    Impacted cerumen, left ear  Debrox OTC as directed  Flush right ear with 1pt peroxide/1pt water; flush no more than twice; stop if any pain    History of CVA (cerebrovascular accident)  -     US Carotid Bilateral; Future  -     IN OFFICE EKG 12-LEAD (to [place])  -     CT Head Without Contrast; Future        F/U with neurology    Hypokalemia  -     Basic Metabolic Panel; Future    Other specified symptoms and signs involving the circulatory and respiratory systems   -     US Carotid Bilateral; Future    Report to ER immediately if symptoms worsen

## 2022-02-22 NOTE — TELEPHONE ENCOUNTER
----- Message from Warren Kruger sent at 2/22/2022  7:25 AM CST -----  Contact: Pt  Type:  Needs Medical Advice    Who Called:  pt   Symptoms (please be specific): dizziness    How long has patient had these symptoms:  Wed 02/16 Friday 02/18 and this morning   Pharmacy name and phone #:     Would the patient rather a call back or a response via [facility]? Phone   Best Call Back Number:   Additional Information: pt states he had a stroke 3 years ago in June BP / took his pressure this morning before meds his BP was 121/81      [facility] [id number] [address] [address]  Phone: [phone] Fax: [phone]

## 2022-02-22 NOTE — TELEPHONE ENCOUNTER
Pt stated he has been having dizzy spells. Pt stated he had one last Wednesday, Friday was light, and today. When he sit up he feel alright. Pt stated he had a stroke almost 3 years ago. Pulse 77 this am. Pt stated it feel like his head swimming when he is laying down then it pass a way. Care advice recommend pt see MD within 24 hours. Pt verbalized understanding.     Reason for Disposition   [1] MODERATE dizziness (e.g., interferes with normal activities) AND [2] has NOT been evaluated by physician for this  (Exception: dizziness caused by heat exposure, sudden standing, or poor fluid intake)    Additional Information   Negative: SEVERE difficulty breathing (e.g., struggling for each breath, speaks in single words)   Negative: [1] Difficulty breathing or swallowing AND [2] started suddenly after medicine, an allergic food or bee sting   Negative: Shock suspected (e.g., cold/pale/clammy skin, too weak to stand, low BP, rapid pulse)   Negative: Difficult to awaken or acting confused (e.g., disoriented, slurred speech)   Negative: [1] Numbness (i.e., loss of sensation) of the face, arm or leg on one side of the body AND [2] sudden onset AND [3] present now   Negative: [1] Weakness (i.e., paralysis, loss of muscle strength) of the face, arm or leg on one side of the body AND [2] sudden onset AND [3] present now   Negative: [1] Loss of speech or garbled speech AND [2] sudden onset AND [3] present now   Negative: Overdose (accidental or intentional) of medications   Negative: [1] Fainted > 15 minutes ago AND [2] still feels too weak or dizzy to stand   Negative: Sounds like a life-threatening emergency to the triager   Negative: Heart beating < 50 beats per minute OR > 140 beats per minute   Negative: Difficulty breathing   Negative: SEVERE dizziness (e.g., unable to stand, requires support to walk, feels like passing out now)   Negative: Extra heart beats OR irregular heart beating  (i.e.,  ""palpitations")   Negative: [1] Drinking very little AND [2] dehydration suspected (e.g., no urine > 12 hours, very dry mouth, very lightheaded)   Negative: [1] Weakness (i.e., paralysis, loss of muscle strength) of the face, arm / hand, or leg / foot on one side of the body AND [2] sudden onset AND [3] brief (now gone)   Negative: [1] Numbness (i.e., loss of sensation) of the face, arm / hand, or leg / foot on one side of the body AND [2] sudden onset AND [3] brief (now gone)   Negative: [1] Loss of speech or garbled speech AND [2] sudden onset AND [3] brief (now gone)   Negative: Loss of vision or double vision (Exception: similar to previous migraines)   Negative: Patient sounds very sick or weak to the triager   Negative: [1] Dizziness caused by heat exposure, sudden standing, or poor fluid intake AND [2] no improvement after 2 hours of rest and fluids   Negative: [1] Fever > 103 F (39.4 C) AND [2] not able to get the fever down using Fever Care Advice   Negative: [1] Fever > 101 F (38.3 C) AND [2] age > 60 years   Negative: [1] Fever > 100.0 F (37.8 C) AND [2] bedridden (e.g., nursing home patient, CVA, chronic illness, recovering from surgery)   Negative: [1] Fever > 100.0 F (37.8 C) AND [2] diabetes mellitus or weak immune system (e.g., HIV positive, cancer chemo, splenectomy, organ transplant, chronic steroids)    Protocols used: DIZZINESS - [id number]      "

## 2022-02-22 NOTE — PATIENT INSTRUCTIONS
Debrox OTC as directed  Flush right ear with 1pt peroxide/1pt water; flush no more than twice; stop if any pain  F/U with neurology  Report to ER immediately if symptoms worsen

## 2022-02-23 ENCOUNTER — TELEPHONE (OUTPATIENT)
Dept: FAMILY MEDICINE | Facility: CLINIC | Age: 72
End: 2022-02-23
Payer: MEDICARE

## 2022-02-23 DIAGNOSIS — R89.9 ABNORMAL LABORATORY TEST RESULT: ICD-10-CM

## 2022-02-23 DIAGNOSIS — E87.6 HYPOKALEMIA: ICD-10-CM

## 2022-02-23 DIAGNOSIS — I65.29 STENOSIS OF CAROTID ARTERY, UNSPECIFIED LATERALITY: Primary | ICD-10-CM

## 2022-02-23 DIAGNOSIS — R42 DIZZINESS AND GIDDINESS: ICD-10-CM

## 2022-02-23 NOTE — TELEPHONE ENCOUNTER
----- Message from Eileen Zuñiga NP sent at 2/22/2022  8:18 PM CST -----  US shows possible subclavian stenosis and 50-69% stenosis of left ICA. Vascular surgery recommended.

## 2022-02-23 NOTE — TELEPHONE ENCOUNTER
----- Message from Ana Aguero sent at 2/23/2022  2:53 PM CST -----  Pt called requesting a call back in regards to his CT Scan , please give a call back at .[phone]      Thanks

## 2022-02-24 NOTE — TELEPHONE ENCOUNTER
----- Message from Martita Reis sent at 2/24/2022  1:06 PM CST -----  Sandoval with [facility] is calling in regards to getting orders for CT Head scan and insurance authorization with a valid date and need to state that it can be performed at [facility] somewhere on the orders. Caller would like orders fax to [phone]. Caller can be reached at [phone]

## 2022-02-24 NOTE — TELEPHONE ENCOUNTER
----- Message from Eileen Zuñiga NP sent at 2/23/2022  2:42 PM CST -----  Reviewed; potassium low; recommend taking potassium as advised by PCP, then repeat in 1w. BG elevated, however lab non-fasting and pt is diabetic, which would account for this; f/u with PCP for diabetes management as previously scheduled.  Kidney function decreased; indicating CKD st 3 at present; increase hydration; repeat BMP in 1 w; consider nephrology if worsening. CBC acceptable.

## 2022-03-02 ENCOUNTER — TELEPHONE (OUTPATIENT)
Dept: FAMILY MEDICINE | Facility: CLINIC | Age: 72
End: 2022-03-02
Payer: MEDICARE

## 2022-03-02 NOTE — TELEPHONE ENCOUNTER
----- Message from Christina Calvert sent at 3/2/2022  8:45 AM CST -----  Pt have a CT scan scheduled [employer] [facility] but need additional paperwork(insurance referral) sent over. Fax number is [phone] and call back [phone] and ask for radiology Pt call back number is [phone]. Thx. El

## 2022-03-02 NOTE — TELEPHONE ENCOUNTER
Spoke with Sandoval in radiology at [facility] to let her know that CT does not need authorization, I also faxed her the documentation and notified the patient.

## 2022-03-09 ENCOUNTER — TELEPHONE (OUTPATIENT)
Dept: FAMILY MEDICINE | Facility: CLINIC | Age: 72
End: 2022-03-09
Payer: MEDICARE

## 2022-03-09 NOTE — TELEPHONE ENCOUNTER
CT results received from [facility] Waddy; reviewed.Shows chronic small vessel ischemic changes;appears stable in comparison to CT head done May 31, 2019. Continue current plan of care and f/u with neurology as advised.

## 2022-04-14 ENCOUNTER — OFFICE VISIT (OUTPATIENT)
Dept: CARDIOTHORACIC SURGERY | Facility: CLINIC | Age: 72
End: 2022-04-14
Payer: MEDICARE

## 2022-04-14 VITALS
TEMPERATURE: 98 F | DIASTOLIC BLOOD PRESSURE: 79 MMHG | SYSTOLIC BLOOD PRESSURE: 141 MMHG | OXYGEN SATURATION: 98 % | WEIGHT: 158.75 LBS | BODY MASS INDEX: 28.13 KG/M2 | HEART RATE: 78 BPM | HEIGHT: 63 IN

## 2022-04-14 DIAGNOSIS — I65.22 STENOSIS OF LEFT CAROTID ARTERY: ICD-10-CM

## 2022-04-14 DIAGNOSIS — Z13.6 ENCOUNTER FOR SCREENING FOR CARDIOVASCULAR DISORDERS: ICD-10-CM

## 2022-04-14 PROCEDURE — 3008F PR BODY MASS INDEX (BMI) DOCUMENTED: ICD-10-PCS | Mod: CPTII,S$GLB,, | Performed by: THORACIC SURGERY (CARDIOTHORACIC VASCULAR SURGERY)

## 2022-04-14 PROCEDURE — 99999 PR PBB SHADOW E&M-EST. PATIENT-LVL IV: CPT | Mod: PBBFAC,,, | Performed by: THORACIC SURGERY (CARDIOTHORACIC VASCULAR SURGERY)

## 2022-04-14 PROCEDURE — 99203 PR OFFICE/OUTPT VISIT, NEW, LEVL III, 30-44 MIN: ICD-10-PCS | Mod: S$GLB,,, | Performed by: THORACIC SURGERY (CARDIOTHORACIC VASCULAR SURGERY)

## 2022-04-14 PROCEDURE — 1101F PT FALLS ASSESS-DOCD LE1/YR: CPT | Mod: CPTII,S$GLB,, | Performed by: THORACIC SURGERY (CARDIOTHORACIC VASCULAR SURGERY)

## 2022-04-14 PROCEDURE — 99999 PR PBB SHADOW E&M-EST. PATIENT-LVL IV: ICD-10-PCS | Mod: PBBFAC,,, | Performed by: THORACIC SURGERY (CARDIOTHORACIC VASCULAR SURGERY)

## 2022-04-14 PROCEDURE — 1159F PR MEDICATION LIST DOCUMENTED IN MEDICAL RECORD: ICD-10-PCS | Mod: CPTII,S$GLB,, | Performed by: THORACIC SURGERY (CARDIOTHORACIC VASCULAR SURGERY)

## 2022-04-14 PROCEDURE — 3008F BODY MASS INDEX DOCD: CPT | Mod: CPTII,S$GLB,, | Performed by: THORACIC SURGERY (CARDIOTHORACIC VASCULAR SURGERY)

## 2022-04-14 PROCEDURE — 1126F AMNT PAIN NOTED NONE PRSNT: CPT | Mod: CPTII,S$GLB,, | Performed by: THORACIC SURGERY (CARDIOTHORACIC VASCULAR SURGERY)

## 2022-04-14 PROCEDURE — 1159F MED LIST DOCD IN RCRD: CPT | Mod: CPTII,S$GLB,, | Performed by: THORACIC SURGERY (CARDIOTHORACIC VASCULAR SURGERY)

## 2022-04-14 PROCEDURE — 1101F PR PT FALLS ASSESS DOC 0-1 FALLS W/OUT INJ PAST YR: ICD-10-PCS | Mod: CPTII,S$GLB,, | Performed by: THORACIC SURGERY (CARDIOTHORACIC VASCULAR SURGERY)

## 2022-04-14 PROCEDURE — 99203 OFFICE O/P NEW LOW 30 MIN: CPT | Mod: S$GLB,,, | Performed by: THORACIC SURGERY (CARDIOTHORACIC VASCULAR SURGERY)

## 2022-04-14 PROCEDURE — 3077F PR MOST RECENT SYSTOLIC BLOOD PRESSURE >= 140 MM HG: ICD-10-PCS | Mod: CPTII,S$GLB,, | Performed by: THORACIC SURGERY (CARDIOTHORACIC VASCULAR SURGERY)

## 2022-04-14 PROCEDURE — 3288F FALL RISK ASSESSMENT DOCD: CPT | Mod: CPTII,S$GLB,, | Performed by: THORACIC SURGERY (CARDIOTHORACIC VASCULAR SURGERY)

## 2022-04-14 PROCEDURE — 3078F PR MOST RECENT DIASTOLIC BLOOD PRESSURE < 80 MM HG: ICD-10-PCS | Mod: CPTII,S$GLB,, | Performed by: THORACIC SURGERY (CARDIOTHORACIC VASCULAR SURGERY)

## 2022-04-14 PROCEDURE — 1126F PR PAIN SEVERITY QUANTIFIED, NO PAIN PRESENT: ICD-10-PCS | Mod: CPTII,S$GLB,, | Performed by: THORACIC SURGERY (CARDIOTHORACIC VASCULAR SURGERY)

## 2022-04-14 PROCEDURE — 3077F SYST BP >= 140 MM HG: CPT | Mod: CPTII,S$GLB,, | Performed by: THORACIC SURGERY (CARDIOTHORACIC VASCULAR SURGERY)

## 2022-04-14 PROCEDURE — 3078F DIAST BP <80 MM HG: CPT | Mod: CPTII,S$GLB,, | Performed by: THORACIC SURGERY (CARDIOTHORACIC VASCULAR SURGERY)

## 2022-04-14 PROCEDURE — 3288F PR FALLS RISK ASSESSMENT DOCUMENTED: ICD-10-PCS | Mod: CPTII,S$GLB,, | Performed by: THORACIC SURGERY (CARDIOTHORACIC VASCULAR SURGERY)

## 2022-04-14 NOTE — PROGRESS NOTES
Subjective:      Patient ID: Jose R Coffman is a 72 y.o. male.    Chief Complaint: No chief complaint on file.    HPI: 72-year-old male has a history of lightheadedness and dizziness had a carotid ultrasound which showed 50-69% blockage of his left internal carotid artery.  Patient also states that he had a weakness of his right side few years ago in 2019. No residual weakness are defect.    Review of patient's allergies indicates:   Allergen Reactions    Lisinopril        Past Medical History:   Diagnosis Date    Hyperlipidemia     Hypertension     Stroke        Family History   Problem Relation Age of Onset    Cancer Mother         throat    Heart disease Father     Heart disease Brother     Cancer Brother        Social History     Socioeconomic History    Marital status:    Tobacco Use    Smoking status: Never Smoker    Smokeless tobacco: Never Used   Substance and Sexual Activity    Alcohol use: Never    Drug use: Never    Sexual activity: Not Currently       History reviewed. No pertinent surgical history.    Review of Systems   Constitutional: Negative for activity change and appetite change.   HENT: Negative for dental problem, nosebleeds and sore throat.    Eyes: Negative for discharge and visual disturbance.   Respiratory: Negative for cough, chest tightness and stridor.    Cardiovascular: Negative for leg swelling.   Gastrointestinal: Negative for abdominal distention and abdominal pain.   Genitourinary: Negative for difficulty urinating and dysuria.   Musculoskeletal: Negative for arthralgias, back pain and joint swelling.   Allergic/Immunologic: Negative for environmental allergies.   Neurological: Positive for dizziness and headaches. Negative for syncope.   Hematological: Does not bruise/bleed easily.   Psychiatric/Behavioral: Negative for behavioral problems.          Objective:   BP (!) 141/79 (BP Location: Right arm, Patient Position: Sitting)   Pulse 78   Temp 98.3 °F (36.8  "°C) (Oral)   Ht 5' 3" (1.6 m)   Wt 72 kg (158 lb 11.7 oz)   SpO2 98%   BMI 28.12 kg/m²     US Carotid Bilateral  Narrative: EXAMINATION:  US CAROTID BILATERAL    CLINICAL HISTORY:  Dizziness and giddiness    TECHNIQUE:  Grayscale and color Doppler ultrasound examination of the carotid and vertebral artery systems bilaterally.  Stenosis estimates are per NASCET measurement criteria.    COMPARISON:  None.    FINDINGS:  Right:    Peak systolic velocity ICA: 115 cm/sec    ICA/CCA velocity ratio: 1.6.    Plaque formation: Mild.    Vertebral artery: Antegrade flow and normal waveform.    Left:    Peak systolic velocity ICA: 143 cm/sec    ICA/CCA velocity ratio: 1.5.    Plaque formation: Mild.    Vertebral artery: Abnormal biphasic waveform suggesting possible underlying subclavian stenosis.  Impression: 1. Findings consistent with 50-69% left ICA stenosis.  2. Abnormal left vertebral artery waveform suggesting possible subclavian stenosis.  .    Electronically signed by: SHAKIR Brown MD  Date:    02/22/2022  Time:    13:53         Physical Exam  Vitals and nursing note reviewed.   Constitutional:       Appearance: Normal appearance.   HENT:      Head: Normocephalic.      Mouth/Throat:      Mouth: Mucous membranes are moist.   Eyes:      Extraocular Movements: Extraocular movements intact.      Pupils: Pupils are equal, round, and reactive to light.   Cardiovascular:      Rate and Rhythm: Normal rate and regular rhythm.   Pulmonary:      Effort: Pulmonary effort is normal.   Abdominal:      Palpations: Abdomen is soft.   Musculoskeletal:      Cervical back: Normal range of motion and neck supple.   Skin:     General: Skin is warm.      Capillary Refill: Capillary refill takes less than 2 seconds.   Neurological:      General: No focal deficit present.      Mental Status: He is alert and oriented to person, place, and time.   Psychiatric:         Mood and Affect: Mood normal.     Carotid ultrasound shows 50-69% " stenosis of his left internal carotid artery    Assessment:     1. Stenosis of left carotid artery    2. Encounter for screening for cardiovascular disorders        Plan   Continue aspirin and Plavix  Will do a CT angiogram of the neck and follow-up after the CT angiogram is done.          Stefanie Sheffield MD,   [facility]  [place]

## 2022-04-21 ENCOUNTER — TELEPHONE (OUTPATIENT)
Dept: CARDIOTHORACIC SURGERY | Facility: CLINIC | Age: 72
End: 2022-04-21
Payer: MEDICARE

## 2022-04-21 NOTE — TELEPHONE ENCOUNTER
Patient rescheduled to May 2, 2022 the patient confirmed the new time and location.    ----- Message from Gladys Martinez sent at 4/21/2022 11:52 AM CDT -----  Contact: [phone] Self  Patient called in regarding appt tomorrow, he is out of town and will not be able to make those appt he said he was told before he left that the test would be performed on the 29th of April please call [phone] with questions     Thanks bs

## 2022-04-26 NOTE — PROGRESS NOTES
Subjective:   Patient ID:  Jose R Coffman is a 72 y.o. male who presents for evaluation of No chief complaint on file.    HPI: 72-year-old male has a history of lightheadedness and dizziness had a carotid ultrasound which showed 50-69% blockage of his left internal carotid artery.  Patient also states that he had a weakness of his right side few years ago in 2019. No residual weakness are defect.  Patient presents the office evidence of hypercholesterolemia and evidence of carotid stenosis and subclavian stenosis while the CT scan soon.  Exertional symptoms of mild shortness breath mild chest discomfort intermittently but no significant issues.  Patient otherwise stable nonsmoker no history of drug or alcohol abuse.  Hypertension under good control current medications.  Will go ahead with a cardiac echo and nuclear scan due to nonspecific EKG changes to make sure he is stable and ready for surgery if it is necessary in the future.    Family History   Problem Relation Age of Onset    Cancer Mother         throat    Heart disease Father     Heart disease Brother     Cancer Brother      Past Medical History:   Diagnosis Date    Hyperlipidemia     Hypertension     Stroke      Social History     Socioeconomic History    Marital status:    Tobacco Use    Smoking status: Never Smoker    Smokeless tobacco: Never Used   Substance and Sexual Activity    Alcohol use: Never    Drug use: Never    Sexual activity: Not Currently     Current Outpatient Medications on File Prior to Visit   Medication Sig Dispense Refill    amLODIPine (NORVASC) 10 MG tablet TAKE 1 TABLET DAILY 90 tablet 4    aspirin (ECOTRIN) 81 MG EC tablet Chew 1 tablet (81 mg total) by mouth daily 90 tablet 4    atorvastatin (LIPITOR) 80 MG tablet Take 1 tablet (80 mg total) by mouth every evening. TAKE 1 TABLET DAILY 90 tablet 4    chlorthalidone (HYGROTEN) 25 MG Tab Take 1 tablet (25 mg total) by mouth once daily. 90 tablet 4     famotidine (PEPCID) 20 MG tablet Take 1 tablet (20 mg total) by mouth 2 (two) times daily 180 tablet 4    gabapentin (NEURONTIN) 300 MG capsule Take 1 capsule (300 mg total) by mouth 3 (three) times daily. 270 capsule 4    metFORMIN (GLUCOPHAGE-XR) 500 MG ER 24hr tablet Take 1 tablet (500 mg total) by mouth daily with breakfast. 90 tablet 3    potassium chloride (MICRO-K) 10 MEQ CpSR TAKE 1 CAPSULE DAILY 90 capsule 3     No current facility-administered medications on file prior to visit.     Review of patient's allergies indicates:   Allergen Reactions    Lisinopril        Review of Systems   Constitutional: Negative for chills, diaphoresis, night sweats, weight gain and weight loss.   HENT: Negative for congestion, hoarse voice, sore throat and stridor.    Eyes: Negative for double vision and pain.   Cardiovascular: Negative for chest pain, claudication, cyanosis, dyspnea on exertion, irregular heartbeat, leg swelling, near-syncope, orthopnea, palpitations, paroxysmal nocturnal dyspnea and syncope.   Respiratory: Negative for cough, hemoptysis, shortness of breath, sleep disturbances due to breathing, snoring, sputum production and wheezing.    Endocrine: Negative for cold intolerance, heat intolerance and polydipsia.   Hematologic/Lymphatic: Negative for bleeding problem. Does not bruise/bleed easily.   Skin: Negative for color change, dry skin and rash.   Musculoskeletal: Negative for joint swelling and muscle cramps.   Gastrointestinal: Negative for bloating, abdominal pain, constipation, diarrhea, dysphagia, melena, nausea and vomiting.   Genitourinary: Negative for flank pain and urgency.   Neurological: Negative for dizziness, focal weakness, headaches, light-headedness, loss of balance, seizures and weakness.   Psychiatric/Behavioral: Negative for altered mental status and memory loss. The patient is not nervous/anxious.          Objective:     Physical Exam  Eyes:      Pupils: Pupils are equal, round,  and reactive to light.   Neck:      Trachea: No tracheal deviation.   Cardiovascular:      Rate and Rhythm: Normal rate and regular rhythm.      Pulses: Intact distal pulses.           Carotid pulses are 2+ on the right side and 2+ on the left side.       Radial pulses are 2+ on the right side and 2+ on the left side.        Femoral pulses are 2+ on the right side and 2+ on the left side.       Popliteal pulses are 2+ on the right side and 2+ on the left side.        Dorsalis pedis pulses are 2+ on the right side and 2+ on the left side.        Posterior tibial pulses are 2+ on the right side and 2+ on the left side.      Heart sounds: Normal heart sounds. No murmur heard.    No friction rub. No gallop.   Pulmonary:      Effort: Pulmonary effort is normal. No respiratory distress.      Breath sounds: Normal breath sounds. No stridor. No wheezing or rales.   Chest:      Chest wall: No tenderness.   Abdominal:      General: There is no distension.      Tenderness: There is no abdominal tenderness. There is no rebound.   Musculoskeletal:         General: No tenderness.      Cervical back: Normal range of motion.   Skin:     General: Skin is warm and dry.   Neurological:      Mental Status: He is alert and oriented to person, place, and time.       EKG shows sinus rhythm nonspecific ST T wave changes.       Assessment:     1. Hyperlipidemia, unspecified hyperlipidemia type    2. Hypertension associated with diabetes    3. Heart murmur    4. Hypokalemia          Plan:         Impression 1 hyperlipidemia stable chronic doses medications including atorvastatin 80 mg daily.  2. Hypertension good control on amlodipine 10 mg daily.  3. Prior stroke on aspirin.  4. Hyperglycemia on metformin  Complex patient with evidence of carotid subclavian stenosis follow-up CT scan will be done possible surgery indicated in the future will go ahead with cardiac echo nuclear scan to rule out significant cardiovascular issues.

## 2022-04-26 NOTE — ASSESSMENT & PLAN NOTE
Render Risk Assessment In Note?: yes Reviewed records from previous hospitalization.  Continue physical therapy.  Continue medications for blood pressure as well as aspirin statin and checking diabetes status.   Additional Notes: Patient consent was obtained to proceed with the visit and recommended plan of care after discussion of all risks and benefits, including the risks of COVID-19 exposure. Detail Level: Simple

## 2022-04-29 ENCOUNTER — OFFICE VISIT (OUTPATIENT)
Dept: CARDIOLOGY | Facility: CLINIC | Age: 72
End: 2022-04-29
Payer: MEDICARE

## 2022-04-29 VITALS
WEIGHT: 157.13 LBS | BODY MASS INDEX: 27.84 KG/M2 | HEART RATE: 64 BPM | HEIGHT: 63 IN | OXYGEN SATURATION: 98 % | SYSTOLIC BLOOD PRESSURE: 126 MMHG | DIASTOLIC BLOOD PRESSURE: 80 MMHG

## 2022-04-29 DIAGNOSIS — E11.59 HYPERTENSION ASSOCIATED WITH DIABETES: Chronic | ICD-10-CM

## 2022-04-29 DIAGNOSIS — E87.6 HYPOKALEMIA: Chronic | ICD-10-CM

## 2022-04-29 DIAGNOSIS — I15.2 HYPERTENSION ASSOCIATED WITH DIABETES: Chronic | ICD-10-CM

## 2022-04-29 DIAGNOSIS — R01.1 HEART MURMUR: ICD-10-CM

## 2022-04-29 DIAGNOSIS — R94.31 NONSPECIFIC ABNORMAL ELECTROCARDIOGRAM (ECG) (EKG): Primary | ICD-10-CM

## 2022-04-29 DIAGNOSIS — E78.5 HYPERLIPIDEMIA, UNSPECIFIED HYPERLIPIDEMIA TYPE: Chronic | ICD-10-CM

## 2022-04-29 PROCEDURE — 1159F MED LIST DOCD IN RCRD: CPT | Mod: CPTII,S$GLB,, | Performed by: INTERNAL MEDICINE

## 2022-04-29 PROCEDURE — 3288F FALL RISK ASSESSMENT DOCD: CPT | Mod: CPTII,S$GLB,, | Performed by: INTERNAL MEDICINE

## 2022-04-29 PROCEDURE — 1101F PT FALLS ASSESS-DOCD LE1/YR: CPT | Mod: CPTII,S$GLB,, | Performed by: INTERNAL MEDICINE

## 2022-04-29 PROCEDURE — 1160F RVW MEDS BY RX/DR IN RCRD: CPT | Mod: CPTII,S$GLB,, | Performed by: INTERNAL MEDICINE

## 2022-04-29 PROCEDURE — 3051F PR MOST RECENT HEMOGLOBIN A1C LEVEL 7.0 - < 8.0%: ICD-10-PCS | Mod: CPTII,S$GLB,, | Performed by: INTERNAL MEDICINE

## 2022-04-29 PROCEDURE — 1159F PR MEDICATION LIST DOCUMENTED IN MEDICAL RECORD: ICD-10-PCS | Mod: CPTII,S$GLB,, | Performed by: INTERNAL MEDICINE

## 2022-04-29 PROCEDURE — 1160F PR REVIEW ALL MEDS BY PRESCRIBER/CLIN PHARMACIST DOCUMENTED: ICD-10-PCS | Mod: CPTII,S$GLB,, | Performed by: INTERNAL MEDICINE

## 2022-04-29 PROCEDURE — 3008F PR BODY MASS INDEX (BMI) DOCUMENTED: ICD-10-PCS | Mod: CPTII,S$GLB,, | Performed by: INTERNAL MEDICINE

## 2022-04-29 PROCEDURE — 99214 OFFICE O/P EST MOD 30 MIN: CPT | Mod: S$GLB,,, | Performed by: INTERNAL MEDICINE

## 2022-04-29 PROCEDURE — 3008F BODY MASS INDEX DOCD: CPT | Mod: CPTII,S$GLB,, | Performed by: INTERNAL MEDICINE

## 2022-04-29 PROCEDURE — 99999 PR PBB SHADOW E&M-EST. PATIENT-LVL IV: CPT | Mod: PBBFAC,,, | Performed by: INTERNAL MEDICINE

## 2022-04-29 PROCEDURE — 3288F PR FALLS RISK ASSESSMENT DOCUMENTED: ICD-10-PCS | Mod: CPTII,S$GLB,, | Performed by: INTERNAL MEDICINE

## 2022-04-29 PROCEDURE — 99999 PR PBB SHADOW E&M-EST. PATIENT-LVL IV: ICD-10-PCS | Mod: PBBFAC,,, | Performed by: INTERNAL MEDICINE

## 2022-04-29 PROCEDURE — 3079F PR MOST RECENT DIASTOLIC BLOOD PRESSURE 80-89 MM HG: ICD-10-PCS | Mod: CPTII,S$GLB,, | Performed by: INTERNAL MEDICINE

## 2022-04-29 PROCEDURE — 1126F AMNT PAIN NOTED NONE PRSNT: CPT | Mod: CPTII,S$GLB,, | Performed by: INTERNAL MEDICINE

## 2022-04-29 PROCEDURE — 1101F PR PT FALLS ASSESS DOC 0-1 FALLS W/OUT INJ PAST YR: ICD-10-PCS | Mod: CPTII,S$GLB,, | Performed by: INTERNAL MEDICINE

## 2022-04-29 PROCEDURE — 3079F DIAST BP 80-89 MM HG: CPT | Mod: CPTII,S$GLB,, | Performed by: INTERNAL MEDICINE

## 2022-04-29 PROCEDURE — 1126F PR PAIN SEVERITY QUANTIFIED, NO PAIN PRESENT: ICD-10-PCS | Mod: CPTII,S$GLB,, | Performed by: INTERNAL MEDICINE

## 2022-04-29 PROCEDURE — 3074F PR MOST RECENT SYSTOLIC BLOOD PRESSURE < 130 MM HG: ICD-10-PCS | Mod: CPTII,S$GLB,, | Performed by: INTERNAL MEDICINE

## 2022-04-29 PROCEDURE — 99214 PR OFFICE/OUTPT VISIT, EST, LEVL IV, 30-39 MIN: ICD-10-PCS | Mod: S$GLB,,, | Performed by: INTERNAL MEDICINE

## 2022-04-29 PROCEDURE — 3051F HG A1C>EQUAL 7.0%<8.0%: CPT | Mod: CPTII,S$GLB,, | Performed by: INTERNAL MEDICINE

## 2022-04-29 PROCEDURE — 3074F SYST BP LT 130 MM HG: CPT | Mod: CPTII,S$GLB,, | Performed by: INTERNAL MEDICINE

## 2022-05-02 ENCOUNTER — HOSPITAL ENCOUNTER (OUTPATIENT)
Dept: RADIOLOGY | Facility: HOSPITAL | Age: 72
Discharge: HOME | End: 2022-05-02
Attending: THORACIC SURGERY (CARDIOTHORACIC VASCULAR SURGERY)
Payer: MEDICARE

## 2022-05-02 DIAGNOSIS — Z13.6 ENCOUNTER FOR SCREENING FOR CARDIOVASCULAR DISORDERS: ICD-10-CM

## 2022-05-02 PROCEDURE — 25500020 PHARM REV CODE 255: Performed by: THORACIC SURGERY (CARDIOTHORACIC VASCULAR SURGERY)

## 2022-05-02 PROCEDURE — 70498 CT ANGIOGRAPHY NECK: CPT | Mod: TC

## 2022-05-02 RX ADMIN — IOHEXOL 100 ML: 350 INJECTION, SOLUTION INTRAVENOUS at 11:05

## 2022-05-04 ENCOUNTER — TELEPHONE (OUTPATIENT)
Dept: CARDIOLOGY | Facility: HOSPITAL | Age: 72
End: 2022-05-04
Payer: MEDICARE

## 2022-05-04 NOTE — TELEPHONE ENCOUNTER
Spoke with patient and he wants to cx nuclear and reschedule in a few weeks.He was too busy right now to do testing.He agreed for me to call him back next week to reschedule.

## 2022-05-12 ENCOUNTER — TELEPHONE (OUTPATIENT)
Dept: CARDIOLOGY | Facility: HOSPITAL | Age: 72
End: 2022-05-12
Payer: MEDICARE

## 2022-06-07 ENCOUNTER — TELEPHONE (OUTPATIENT)
Dept: CARDIOLOGY | Facility: HOSPITAL | Age: 72
End: 2022-06-07
Payer: MEDICARE

## 2022-06-07 NOTE — TELEPHONE ENCOUNTER
I called patient to go over instructions for his Nuclear stress test.He stated his wife has to have surgery and he wants to reschedule in a few weeks so he can care for her. I told him I would check on him next week and set up his tests and his follow up with Dr Crooks pending how his wife is doing and he agreed.He also was asking for his CT results that no one has contacted him yet per patient.I reviewed some of it but advised he contact the ordering physician for his complete results. He agreed. Message to Dr Smith.

## 2022-06-08 ENCOUNTER — TELEPHONE (OUTPATIENT)
Dept: CARDIOTHORACIC SURGERY | Facility: CLINIC | Age: 72
End: 2022-06-08
Payer: MEDICARE

## 2022-06-08 NOTE — TELEPHONE ENCOUNTER
Patient contacted and was advised that we could get him scheduled to see the provider sooner. The patient declined, stating his wife is ill this week and next week would be more ideal. The patient is scheduled.

## 2022-06-10 ENCOUNTER — TELEPHONE (OUTPATIENT)
Dept: CARDIOLOGY | Facility: HOSPITAL | Age: 72
End: 2022-06-10
Payer: MEDICARE

## 2022-06-28 ENCOUNTER — LAB VISIT (OUTPATIENT)
Dept: LAB | Facility: HOSPITAL | Age: 72
End: 2022-06-28
Attending: FAMILY MEDICINE
Payer: MEDICARE

## 2022-06-28 ENCOUNTER — OFFICE VISIT (OUTPATIENT)
Dept: FAMILY MEDICINE | Facility: CLINIC | Age: 72
End: 2022-06-28
Payer: MEDICARE

## 2022-06-28 VITALS
HEIGHT: 63 IN | BODY MASS INDEX: 27.97 KG/M2 | RESPIRATION RATE: 16 BRPM | OXYGEN SATURATION: 95 % | SYSTOLIC BLOOD PRESSURE: 126 MMHG | TEMPERATURE: 98 F | WEIGHT: 157.88 LBS | HEART RATE: 64 BPM | DIASTOLIC BLOOD PRESSURE: 78 MMHG

## 2022-06-28 DIAGNOSIS — I73.9 PERIPHERAL VASCULAR DISEASE, UNSPECIFIED: ICD-10-CM

## 2022-06-28 DIAGNOSIS — G31.9 DEGENERATIVE DISEASE OF NERVOUS SYSTEM, UNSPECIFIED: ICD-10-CM

## 2022-06-28 DIAGNOSIS — I69.959 HEMIPLEGIA AND HEMIPARESIS FOLLOWING UNSPECIFIED CEREBROVASCULAR DISEASE AFFECTING UNSPECIFIED SIDE: ICD-10-CM

## 2022-06-28 DIAGNOSIS — N18.31 STAGE 3A CHRONIC KIDNEY DISEASE: ICD-10-CM

## 2022-06-28 DIAGNOSIS — E11.65 TYPE 2 DIABETES MELLITUS WITH HYPERGLYCEMIA, WITHOUT LONG-TERM CURRENT USE OF INSULIN: ICD-10-CM

## 2022-06-28 DIAGNOSIS — G63 POLYNEUROPATHY IN DISEASES CLASSIFIED ELSEWHERE: Primary | ICD-10-CM

## 2022-06-28 DIAGNOSIS — G63 POLYNEUROPATHY IN DISEASES CLASSIFIED ELSEWHERE: ICD-10-CM

## 2022-06-28 DIAGNOSIS — Z12.5 ENCOUNTER FOR PROSTATE CANCER SCREENING: ICD-10-CM

## 2022-06-28 LAB
ALBUMIN SERPL BCP-MCNC: 3.7 G/DL (ref 3.5–5.2)
ALBUMIN/CREAT UR: 4.7 UG/MG (ref 0–30)
ALP SERPL-CCNC: 87 U/L (ref 55–135)
ALT SERPL W/O P-5'-P-CCNC: 18 U/L (ref 10–44)
ANION GAP SERPL CALC-SCNC: 9 MMOL/L (ref 8–16)
AST SERPL-CCNC: 18 U/L (ref 10–40)
BILIRUB SERPL-MCNC: 0.6 MG/DL (ref 0.1–1)
BUN SERPL-MCNC: 17 MG/DL (ref 8–23)
CALCIUM SERPL-MCNC: 9.6 MG/DL (ref 8.7–10.5)
CHLORIDE SERPL-SCNC: 101 MMOL/L (ref 95–110)
CO2 SERPL-SCNC: 30 MMOL/L (ref 23–29)
COMPLEXED PSA SERPL-MCNC: 0.87 NG/ML (ref 0–4)
CREAT SERPL-MCNC: 1.3 MG/DL (ref 0.5–1.4)
CREAT UR-MCNC: 214 MG/DL (ref 23–375)
EST. GFR  (AFRICAN AMERICAN): >60 ML/MIN/1.73 M^2
EST. GFR  (NON AFRICAN AMERICAN): 54.5 ML/MIN/1.73 M^2
ESTIMATED AVG GLUCOSE: 146 MG/DL (ref 68–131)
GLUCOSE SERPL-MCNC: 112 MG/DL (ref 70–110)
HBA1C MFR BLD: 6.7 % (ref 4–5.6)
MICROALBUMIN UR DL<=1MG/L-MCNC: 10 UG/ML
POTASSIUM SERPL-SCNC: 3.5 MMOL/L (ref 3.5–5.1)
PROT SERPL-MCNC: 7.5 G/DL (ref 6–8.4)
SODIUM SERPL-SCNC: 140 MMOL/L (ref 136–145)

## 2022-06-28 PROCEDURE — 3288F FALL RISK ASSESSMENT DOCD: CPT | Mod: CPTII,S$GLB,, | Performed by: FAMILY MEDICINE

## 2022-06-28 PROCEDURE — 3074F PR MOST RECENT SYSTOLIC BLOOD PRESSURE < 130 MM HG: ICD-10-PCS | Mod: CPTII,S$GLB,, | Performed by: FAMILY MEDICINE

## 2022-06-28 PROCEDURE — 1126F AMNT PAIN NOTED NONE PRSNT: CPT | Mod: CPTII,S$GLB,, | Performed by: FAMILY MEDICINE

## 2022-06-28 PROCEDURE — 3066F PR DOCUMENTATION OF TREATMENT FOR NEPHROPATHY: ICD-10-PCS | Mod: CPTII,S$GLB,, | Performed by: FAMILY MEDICINE

## 2022-06-28 PROCEDURE — 3008F PR BODY MASS INDEX (BMI) DOCUMENTED: ICD-10-PCS | Mod: CPTII,S$GLB,, | Performed by: FAMILY MEDICINE

## 2022-06-28 PROCEDURE — 99214 OFFICE O/P EST MOD 30 MIN: CPT | Mod: S$GLB,,, | Performed by: FAMILY MEDICINE

## 2022-06-28 PROCEDURE — 3078F PR MOST RECENT DIASTOLIC BLOOD PRESSURE < 80 MM HG: ICD-10-PCS | Mod: CPTII,S$GLB,, | Performed by: FAMILY MEDICINE

## 2022-06-28 PROCEDURE — 99999 PR PBB SHADOW E&M-EST. PATIENT-LVL IV: CPT | Mod: PBBFAC,,, | Performed by: FAMILY MEDICINE

## 2022-06-28 PROCEDURE — 3044F HG A1C LEVEL LT 7.0%: CPT | Mod: CPTII,S$GLB,, | Performed by: FAMILY MEDICINE

## 2022-06-28 PROCEDURE — 84153 ASSAY OF PSA TOTAL: CPT | Performed by: FAMILY MEDICINE

## 2022-06-28 PROCEDURE — 99999 PR PBB SHADOW E&M-EST. PATIENT-LVL IV: ICD-10-PCS | Mod: PBBFAC,,, | Performed by: FAMILY MEDICINE

## 2022-06-28 PROCEDURE — 1159F MED LIST DOCD IN RCRD: CPT | Mod: CPTII,S$GLB,, | Performed by: FAMILY MEDICINE

## 2022-06-28 PROCEDURE — 1101F PT FALLS ASSESS-DOCD LE1/YR: CPT | Mod: CPTII,S$GLB,, | Performed by: FAMILY MEDICINE

## 2022-06-28 PROCEDURE — 3066F NEPHROPATHY DOC TX: CPT | Mod: CPTII,S$GLB,, | Performed by: FAMILY MEDICINE

## 2022-06-28 PROCEDURE — 3074F SYST BP LT 130 MM HG: CPT | Mod: CPTII,S$GLB,, | Performed by: FAMILY MEDICINE

## 2022-06-28 PROCEDURE — 1159F PR MEDICATION LIST DOCUMENTED IN MEDICAL RECORD: ICD-10-PCS | Mod: CPTII,S$GLB,, | Performed by: FAMILY MEDICINE

## 2022-06-28 PROCEDURE — 3044F PR MOST RECENT HEMOGLOBIN A1C LEVEL <7.0%: ICD-10-PCS | Mod: CPTII,S$GLB,, | Performed by: FAMILY MEDICINE

## 2022-06-28 PROCEDURE — 1126F PR PAIN SEVERITY QUANTIFIED, NO PAIN PRESENT: ICD-10-PCS | Mod: CPTII,S$GLB,, | Performed by: FAMILY MEDICINE

## 2022-06-28 PROCEDURE — 1160F PR REVIEW ALL MEDS BY PRESCRIBER/CLIN PHARMACIST DOCUMENTED: ICD-10-PCS | Mod: CPTII,S$GLB,, | Performed by: FAMILY MEDICINE

## 2022-06-28 PROCEDURE — 82570 ASSAY OF URINE CREATININE: CPT | Performed by: FAMILY MEDICINE

## 2022-06-28 PROCEDURE — 3008F BODY MASS INDEX DOCD: CPT | Mod: CPTII,S$GLB,, | Performed by: FAMILY MEDICINE

## 2022-06-28 PROCEDURE — 3061F PR NEG MICROALBUMINURIA RESULT DOCUMENTED/REVIEW: ICD-10-PCS | Mod: CPTII,S$GLB,, | Performed by: FAMILY MEDICINE

## 2022-06-28 PROCEDURE — 99214 PR OFFICE/OUTPT VISIT, EST, LEVL IV, 30-39 MIN: ICD-10-PCS | Mod: S$GLB,,, | Performed by: FAMILY MEDICINE

## 2022-06-28 PROCEDURE — 3078F DIAST BP <80 MM HG: CPT | Mod: CPTII,S$GLB,, | Performed by: FAMILY MEDICINE

## 2022-06-28 PROCEDURE — 1101F PR PT FALLS ASSESS DOC 0-1 FALLS W/OUT INJ PAST YR: ICD-10-PCS | Mod: CPTII,S$GLB,, | Performed by: FAMILY MEDICINE

## 2022-06-28 PROCEDURE — 1160F RVW MEDS BY RX/DR IN RCRD: CPT | Mod: CPTII,S$GLB,, | Performed by: FAMILY MEDICINE

## 2022-06-28 PROCEDURE — 83036 HEMOGLOBIN GLYCOSYLATED A1C: CPT | Performed by: FAMILY MEDICINE

## 2022-06-28 PROCEDURE — 3061F NEG MICROALBUMINURIA REV: CPT | Mod: CPTII,S$GLB,, | Performed by: FAMILY MEDICINE

## 2022-06-28 PROCEDURE — 82043 UR ALBUMIN QUANTITATIVE: CPT | Performed by: FAMILY MEDICINE

## 2022-06-28 PROCEDURE — 3288F PR FALLS RISK ASSESSMENT DOCUMENTED: ICD-10-PCS | Mod: CPTII,S$GLB,, | Performed by: FAMILY MEDICINE

## 2022-06-28 PROCEDURE — 80053 COMPREHEN METABOLIC PANEL: CPT | Performed by: FAMILY MEDICINE

## 2022-06-28 PROCEDURE — 36415 COLL VENOUS BLD VENIPUNCTURE: CPT | Mod: PO | Performed by: FAMILY MEDICINE

## 2022-06-28 RX ORDER — GABAPENTIN 100 MG/1
100 CAPSULE ORAL 3 TIMES DAILY
Qty: 90 CAPSULE | Refills: 11 | Status: SHIPPED | OUTPATIENT
Start: 2022-06-28 | End: 2023-01-10 | Stop reason: CLARIF

## 2022-06-28 NOTE — PROGRESS NOTES
Please CALL patient with results and Document verification.   [phone]     URINE PROTEIN CREATININE RATIO NORMAL-The urine protein/creatinine screen for protein in the urine was normal.  Repeat this annually.

## 2022-06-28 NOTE — PATIENT INSTRUCTIONS
Follow up in 6 months (on 12/28/2022), or if symptoms worsen or fail to improve, for Annual Wellness Exam.     Dear patient,   As a result of recent federal legislation (The Federal Cures Act), you may receive lab or pathology results from your visit in your [facility] account before your physician is able to contact you. Your physician or their representative will relay the results to you with their recommendations at their soonest availability.     If no improvement in symptoms or symptoms worsen, please be advised to call MD, follow-up at clinic and/or go to ER if becomes severe.    Landon Smith M.D.        We Offer TELEHEALTH & Same Day Appointments!   Book your Telehealth appointment with me through my nurse or   Clinic appointments on [facility]!    [address] [place] [address]    Office: [phone]   FAX: [phone]    Check out my Facebook Page and Follow Me at: [email or web address]    Check out my website at [employer] by clicking on: [email or web address]    To Schedule appointments online, go to [email or web address]: [email or web address]

## 2022-06-28 NOTE — PROGRESS NOTES
PLAN:      Problem List Items Addressed This Visit     Degenerative disease of nervous system, unspecified (Chronic)     Continue medication regimen as prescribed.  Patient reports that is working well for him at this time.Continue gabapentin.   Risk and benefits were discussed.           Relevant Orders    Comprehensive Metabolic Panel (Completed)    Hemoglobin A1C (Completed)    Stage 3a chronic kidney disease (Chronic)      GFR stable.  Avoid anti-inflammatory medications.  Increase hydration with water.           Relevant Orders    Comprehensive Metabolic Panel (Completed)    Hemoglobin A1C (Completed)    Type 2 diabetes mellitus with hyperglycemia, without long-term current use of insulin (Chronic)       A1c stable on metformin.We will plan to monitor hemoglobin A1c at designated intervals 3 to 6 months.  I recommend ongoing Education for diabetic diet and exercise protocol.  We will continue to monitor for side effects.    Please be advised of symptoms to monitor for and to notify me immediately if persistent or worsening.  Follow up with Ophthalmology/Optometry and Podiatry at least annually.             Relevant Orders    Comprehensive Metabolic Panel (Completed)    Hemoglobin A1C (Completed)    Polyneuropathy in diseases classified elsewhere - Primary (Chronic)      Increasing gabapentin to 400 milligrams 3 times daily.  Follow-up sooner if no improvement.           Relevant Medications    gabapentin (NEURONTIN) 100 MG capsule    Other Relevant Orders    Comprehensive Metabolic Panel (Completed)    Hemoglobin A1C (Completed)    Peripheral vascular disease, unspecified (Chronic)      Continue follow-up with Cardiology.  Continue baby aspirin.           Relevant Orders    Comprehensive Metabolic Panel (Completed)    Hemoglobin A1C (Completed)    Hemiplegia and hemiparesis following unspecified cerebrovascular disease affecting unspecified side (Chronic)      Stroke precautions.  Referral to physical therapy  if needed.           Relevant Orders    Comprehensive Metabolic Panel (Completed)    Hemoglobin A1C (Completed)        Future Appointments     Date Provider Specialty Appt Notes    7/7/2022 Stefanie Sheffield MD Cardiothoracic Surgery .pre-op consult    8/1/2022  Cardiology .    8/1/2022  Cardiology .    8/12/2022 Deandre Crooks MD Cardiology 3 week// overdue f/u    1/9/2023 Landon Smith MD Family Medicine 6 month f/u          Medication Management for assessment above:   Medication List with Changes/Refills   New Medications    GABAPENTIN (NEURONTIN) 100 MG CAPSULE    Take 1 capsule (100 mg total) by mouth 3 (three) times daily. Take in addition to the 300mg   Current Medications    AMLODIPINE (NORVASC) 10 MG TABLET    TAKE 1 TABLET DAILY    ASPIRIN (ECOTRIN) 81 MG EC TABLET    Chew 1 tablet (81 mg total) by mouth daily    ATORVASTATIN (LIPITOR) 80 MG TABLET    Take 1 tablet (80 mg total) by mouth every evening. TAKE 1 TABLET DAILY    CHLORTHALIDONE (HYGROTEN) 25 MG TAB    Take 1 tablet (25 mg total) by mouth once daily.    FAMOTIDINE (PEPCID) 20 MG TABLET    Take 1 tablet (20 mg total) by mouth 2 (two) times daily    GABAPENTIN (NEURONTIN) 300 MG CAPSULE    Take 1 capsule (300 mg total) by mouth 3 (three) times daily.    METFORMIN (GLUCOPHAGE-XR) 500 MG ER 24HR TABLET    Take 1 tablet (500 mg total) by mouth daily with breakfast.    POTASSIUM CHLORIDE (MICRO-K) 10 MEQ CPSR    TAKE 1 CAPSULE DAILY   Discontinued Medications    SARS-COV-2, COVID-19, (MODERNA COVID-19) 100 MCG/0.5 ML INJECTION           Landon Smith M.D.  ==========================================================================  Subjective:   Patient ID: Jose R Coffman is a 72 y.o. male.  has a past medical history of Hyperlipidemia, Hypertension, and Stroke.   Chief Complaint: Follow-up and Hypertension      Problem List Items Addressed This Visit     Degenerative disease of nervous system, unspecified (Chronic)    Overview        June 2022:   Patient reports no change in HPI.  Initial HPI:  Chronic.  Worsening since his stroke.  Also associated with diabetes.  Patient reports sharp shooting pain in his left arm and leg intermittently.  Patient is requesting something for the pain.  December 2020:  Patient reports compliance with gabapentin.  Intermittent control.  June 2021:  Still having chronic Repatha pain that is controlled with gabapentin.  Patient complaining of left shoulder stiffness that is chronic.  His strength is stable and improved from previous visit.  December 2021: Continue gabapentin.  Reports compliance.  No side effects reported.  Symptoms are controlled.           Current Assessment & Plan     Continue medication regimen as prescribed.  Patient reports that is working well for him at this time.Continue gabapentin.   Risk and benefits were discussed.           Stage 3a chronic kidney disease (Chronic)    Overview     Lab Results   Component Value Date    CREATININE 1.3 06/28/2022    BUN 17 06/28/2022     06/28/2022    K 3.5 06/28/2022     06/28/2022    CO2 30 (H) 06/28/2022     BMP  Lab Results   Component Value Date     06/28/2022    K 3.5 06/28/2022     06/28/2022    CO2 30 (H) 06/28/2022    BUN 17 06/28/2022    CREATININE 1.3 06/28/2022    CALCIUM 9.6 06/28/2022    ANIONGAP 9 06/28/2022    ESTGFRAFRICA >60.0 06/28/2022    EGFRNONAA 54.5 (A) 06/28/2022                Current Assessment & Plan      GFR stable.  Avoid anti-inflammatory medications.  Increase hydration with water.           Type 2 diabetes mellitus with hyperglycemia, without long-term current use of insulin (Chronic)    Overview     Diabetes Management StatusStatin: TakingACE/ARB: Not taking    June 2022:  Diabetes Management Status    Statin: Taking  ACE/ARB: Not taking    Screening or Prevention Patient's value Goal Complete/Controlled?   HgA1C Testing and Control   Lab Results   Component Value Date    HGBA1C 6.7 (H) 06/28/2022       Annually/Less than 8% Yes   Lipid profile : 12/13/2021 Annually Yes   LDL control Lab Results   Component Value Date    LDLCALC 115.6 12/13/2021    Annually/Less than 100 mg/dl  No   Nephropathy screening Lab Results   Component Value Date    LABMICR 10.0 06/28/2022     No results found for: PROTEINUA  No results found for: UTPCR   Annually Yes   Blood pressure BP Readings from Last 1 Encounters:   06/28/22 126/78    Less than 140/90 Yes   Dilated retinal exam : 01/09/2020 Annually No   Foot exam   Most Recent Foot Exam Date: Not Found Annually No                Current Assessment & Plan       A1c stable on metformin.We will plan to monitor hemoglobin A1c at designated intervals 3 to 6 months.  I recommend ongoing Education for diabetic diet and exercise protocol.  We will continue to monitor for side effects.    Please be advised of symptoms to monitor for and to notify me immediately if persistent or worsening.  Follow up with Ophthalmology/Optometry and Podiatry at least annually.             Polyneuropathy in diseases classified elsewhere - Primary (Chronic)    Overview       Chronic.  Intermittent control.  Patient is   On gabapentin 300 milligrams 3 times daily.  He is reporting breakthrough neuropathy.           Current Assessment & Plan      Increasing gabapentin to 400 milligrams 3 times daily.  Follow-up sooner if no improvement.           Peripheral vascular disease, unspecified (Chronic)    Overview       Chronic.  Stable.  Followed by Cardiology.  He is taking a baby aspirin           Current Assessment & Plan      Continue follow-up with Cardiology.  Continue baby aspirin.           Hemiplegia and hemiparesis following unspecified cerebrovascular disease affecting unspecified side (Chronic)    Overview       Chronic.  Stable.  Patient   Has history of stroke.           Current Assessment & Plan      Stroke precautions.  Referral to physical therapy if needed.                  Review of patient's  "allergies indicates:   Allergen Reactions    Lisinopril      Current Outpatient Medications   Medication Instructions    amLODIPine (NORVASC) 10 MG tablet TAKE 1 TABLET DAILY    aspirin (ECOTRIN) 81 MG EC tablet Chew 1 tablet (81 mg total) by mouth daily    atorvastatin (LIPITOR) 80 mg, Oral, Nightly, TAKE 1 TABLET DAILY    chlorthalidone (HYGROTEN) 25 mg, Oral, Daily    famotidine (PEPCID) 20 MG tablet Take 1 tablet (20 mg total) by mouth 2 (two) times daily    gabapentin (NEURONTIN) 300 mg, Oral, 3 times daily    gabapentin (NEURONTIN) 100 mg, Oral, 3 times daily, Take in addition to the 300mg    metFORMIN (GLUCOPHAGE-XR) 500 mg, Oral, With breakfast    potassium chloride (MICRO-K) 10 MEQ CpSR TAKE 1 CAPSULE DAILY      I have reviewed the PMH, social history, FamilyHx, surgical history, allergies and medications documented / confirmed by the patient at the time of this visit.  Review of Systems   Constitutional: Negative for chills, fatigue, fever and unexpected weight change.   HENT: Negative for ear pain and sore throat.    Eyes: Negative for redness and visual disturbance.   Respiratory: Negative for cough and shortness of breath.    Cardiovascular: Negative for chest pain and palpitations.   Gastrointestinal: Negative for nausea and vomiting.   Endocrine: Negative for cold intolerance and heat intolerance.   Genitourinary: Negative for difficulty urinating and hematuria.   Musculoskeletal: Positive for arthralgias. Negative for myalgias.   Skin: Negative for rash and wound.   Allergic/Immunologic: Negative for environmental allergies and food allergies.   Neurological: Positive for weakness ( chronic). Negative for headaches.   Hematological: Negative for adenopathy. Does not bruise/bleed easily.   Psychiatric/Behavioral: Negative for sleep disturbance. The patient is not nervous/anxious.      Objective:   /78   Pulse 64   Temp 97.9 °F (36.6 °C) (Temporal)   Resp 16   Ht 5' 3" (1.6 m)   Wt " 71.6 kg (157 lb 14.4 oz)   SpO2 95%   BMI 27.97 kg/m²   Physical Exam  Vitals and nursing note reviewed.   Constitutional:       General: He is not in acute distress.     Appearance: He is well-developed.   HENT:      Head: Normocephalic and atraumatic.   Eyes:      Pupils: Pupils are equal, round, and reactive to light.   Cardiovascular:      Rate and Rhythm: Normal rate and regular rhythm.      Heart sounds: Murmur heard.   Pulmonary:      Effort: Pulmonary effort is normal. No respiratory distress.      Breath sounds: Normal breath sounds. No wheezing.   Abdominal:      General: Bowel sounds are normal. There is no distension.      Palpations: Abdomen is soft.   Musculoskeletal:         General: Normal range of motion.      Cervical back: Normal range of motion and neck supple.   Skin:     General: Skin is warm and dry.      Capillary Refill: Capillary refill takes less than 2 seconds.   Neurological:      Mental Status: He is alert and oriented to person, place, and time.      Cranial Nerves: No cranial nerve deficit.      Sensory: No sensory deficit.      Motor: No abnormal muscle tone or seizure activity.      Coordination: Coordination normal.      Gait: Gait normal.      Comments: Chronic mild Left upper extremity weakness No slurring of speech.   Psychiatric:         Behavior: Behavior normal.          Assessment:     1. Polyneuropathy in diseases classified elsewhere    2. Peripheral vascular disease, unspecified    3. Hemiplegia and hemiparesis following unspecified cerebrovascular disease affecting unspecified side    4. Degenerative disease of nervous system, unspecified    5. Stage 3a chronic kidney disease    6. Type 2 diabetes mellitus with hyperglycemia, without long-term current use of insulin      MDM:    moderate complexity.  Moderate risk.  Total time: 31 minutes.  This includes total time spent on the encounter, which includes face to face time and non-face to face time preparing to see the  patient (eg, review of previous medical records, tests), Obtaining and/or reviewing separately obtained history, documenting clinical information in the electronic or other health record, independently interpreting results (not separately reported)/communicating results to the patient/family/caregiver, and/or care coordination (not separately reported).    I have Reviewed and summarized old records.  I have performed thorough medication reconciliation today and discussed risk and benefits of medications.  I have reviewed labs and discussed with patient.  All questions were answered.  I am requesting old records and will review them once they are available.  Cardiology    I have signed for the following orders AND/OR meds.  Orders Placed This Encounter   Procedures    Comprehensive Metabolic Panel     Standing Status:   Future     Number of Occurrences:   1     Standing Expiration Date:   8/27/2023    Hemoglobin A1C     Standing Status:   Future     Number of Occurrences:   1     Standing Expiration Date:   8/27/2023     Medications Ordered This Encounter   Medications    gabapentin (NEURONTIN) 100 MG capsule     Sig: Take 1 capsule (100 mg total) by mouth 3 (three) times daily. Take in addition to the 300mg     Dispense:  90 capsule     Refill:  11        Follow up in 6 months (on 12/28/2022), or if symptoms worsen or fail to improve, for Annual Wellness Exam.    If no improvement in symptoms or symptoms worsen, advised to call/follow-up at clinic or go to ER. Patient voiced understanding and all questions/concerns were addressed.   DISCLAIMER: This note was compiled by using a speech recognition dictation system and therefore please be aware that typographical / speech recognition errors can and do occur.  Please contact me if you see any errors specifically.    Landon Smith M.D.       Office: [phone] [address] [place] [address]  FAX: [phone]

## 2022-06-29 NOTE — ASSESSMENT & PLAN NOTE
Continue medication regimen as prescribed.  Patient reports that is working well for him at this time.Continue gabapentin.   Risk and benefits were discussed.

## 2022-06-29 NOTE — ASSESSMENT & PLAN NOTE
A1c stable on metformin.We will plan to monitor hemoglobin A1c at designated intervals 3 to 6 months.  I recommend ongoing Education for diabetic diet and exercise protocol.  We will continue to monitor for side effects.    Please be advised of symptoms to monitor for and to notify me immediately if persistent or worsening.  Follow up with Ophthalmology/Optometry and Podiatry at least annually.

## 2022-07-01 NOTE — PROGRESS NOTES
Make follow-up lab appointment per recommendation below.  Check to see if patient has seen the results through my chart.  If not then,  #CALL THE PATIENT# to discuss results/see if they have questions and document verification of contact. Make F/U appt if needed. [phone]  PSA screening for prostate cancer is within normal limits.  Repeat annually.  CMP is stable.  A1c is improved from previous.  Continue current medication regimen.  Recheck lab six months prior to appointment.

## 2022-07-05 ENCOUNTER — PATIENT OUTREACH (OUTPATIENT)
Dept: ADMINISTRATIVE | Facility: HOSPITAL | Age: 72
End: 2022-07-05
Payer: MEDICARE

## 2022-07-27 ENCOUNTER — TELEPHONE (OUTPATIENT)
Dept: CARDIOLOGY | Facility: HOSPITAL | Age: 72
End: 2022-07-27
Payer: MEDICARE

## 2022-07-27 NOTE — TELEPHONE ENCOUNTER
Spoke with patient and reviewed instructions for his stress nuclear Monday.He stated he will be here if his wife is feeling better.Will call back in a few days to check status.

## 2022-08-01 ENCOUNTER — TELEPHONE (OUTPATIENT)
Dept: CARDIOLOGY | Facility: HOSPITAL | Age: 72
End: 2022-08-01
Payer: MEDICARE

## 2022-09-23 ENCOUNTER — TELEPHONE (OUTPATIENT)
Dept: FAMILY MEDICINE | Facility: CLINIC | Age: 72
End: 2022-09-23
Payer: MEDICARE

## 2022-09-23 DIAGNOSIS — Z79.899 ENCOUNTER FOR LONG-TERM (CURRENT) USE OF MEDICATIONS: ICD-10-CM

## 2022-09-23 DIAGNOSIS — G63 NEUROPATHY DUE TO MEDICAL CONDITION: ICD-10-CM

## 2022-09-23 RX ORDER — GABAPENTIN 300 MG/1
300 CAPSULE ORAL 3 TIMES DAILY
Qty: 270 CAPSULE | Refills: 4 | Status: SHIPPED | OUTPATIENT
Start: 2022-09-23 | End: 2023-01-10 | Stop reason: CLARIF

## 2022-09-23 NOTE — TELEPHONE ENCOUNTER
----- Message from Megan Matthew sent at 9/23/2022 12:26 PM CDT -----  Contact: Jose R Odell called and stated that a prescription was sent in for 100mg of Gabapentin instead of the 400mg like he had been changed to, so he is needing to know if he is to take 4 of the 100mg or what he needs to do. Please call him back at [phone]

## 2022-09-23 NOTE — TELEPHONE ENCOUNTER
I have signed for the following orders AND/OR meds.  Please call the patient and ask the patient to schedule the testing AND/OR inform about any medications that were sent.      No orders of the defined types were placed in this encounter.      Medications Ordered This Encounter   Medications    gabapentin (NEURONTIN) 300 MG capsule     Sig: Take 1 capsule (300 mg total) by mouth 3 (three) times daily.     Dispense:  270 capsule     Refill:  4

## 2022-11-22 ENCOUNTER — PATIENT OUTREACH (OUTPATIENT)
Dept: ADMINISTRATIVE | Facility: HOSPITAL | Age: 72
End: 2022-11-22
Payer: MEDICARE

## 2022-11-22 NOTE — PROGRESS NOTES
Attempted to contact patient by phone for a Diabetic Eye Screening , was not able to leave voice mail message. No voice set up.

## 2022-11-28 DIAGNOSIS — E11.65 TYPE 2 DIABETES MELLITUS WITH HYPERGLYCEMIA, WITHOUT LONG-TERM CURRENT USE OF INSULIN: ICD-10-CM

## 2022-11-28 DIAGNOSIS — Z79.899 ENCOUNTER FOR LONG-TERM (CURRENT) USE OF MEDICATIONS: ICD-10-CM

## 2022-11-28 DIAGNOSIS — N18.31 STAGE 3A CHRONIC KIDNEY DISEASE: ICD-10-CM

## 2022-11-28 RX ORDER — METFORMIN HYDROCHLORIDE 500 MG/1
TABLET, EXTENDED RELEASE ORAL
Qty: 90 TABLET | Refills: 0 | Status: SHIPPED | OUTPATIENT
Start: 2022-11-28 | End: 2023-01-10 | Stop reason: SDUPTHER

## 2022-11-28 NOTE — TELEPHONE ENCOUNTER
Refill Decision Note   Jose R Meghna  is requesting a refill authorization.  Brief Assessment and Rationale for Refill:  Approve     Medication Therapy Plan:       Medication Reconciliation Completed: No   Comments:     No Care Gaps recommended.     Note composed:3:40 PM 11/28/2022

## 2022-11-28 NOTE — TELEPHONE ENCOUNTER
Care Due:                  Date            Visit Type   Department     Provider  --------------------------------------------------------------------------------                                EP -                              PRIMARY      [facility] FAMILY  Last Visit: 06-      CARE ([facility])   MEDICINE       Landon Smith                               -                              PRIMARY      [facility] FAMILY  Next Visit: 01-      CARE ([facility])   MEDICINE       Landon Smith                                                            Last  Test          Frequency    Reason                     Performed    Due Date  --------------------------------------------------------------------------------    HBA1C.......  6 months...  metFORMIN................  06- 12-    Lipid Panel.  12 months..  atorvastatin.............  12- 12-    [facility] Embedded Care Gaps. Reference number: [id number]. 11/28/2022   12:54:14 AM CST

## 2022-11-30 RX ORDER — MELOXICAM 15 MG/1
15 TABLET ORAL DAILY
Qty: 30 TABLET | Refills: 0 | Status: SHIPPED | OUTPATIENT
Start: 2022-11-30 | End: 2023-01-10

## 2022-11-30 RX ORDER — MELOXICAM 15 MG/1
15 TABLET ORAL DAILY
Qty: 30 TABLET | Refills: 0 | Status: SHIPPED | OUTPATIENT
Start: 2022-11-30 | End: 2022-11-30 | Stop reason: SDUPTHER

## 2022-11-30 NOTE — TELEPHONE ENCOUNTER
----- Message from Marcy Loya sent at 11/30/2022  2:22 PM CST -----  Contact: Jose R Odell called today and requested a refill but now want the RX:  meloxicam (MOBIC) 15 MG tablet    Sent to a different pharmacy :   [facility]  [address],   [place]  [address]     [phone]     Please call Stephen at [phone] (home) [phone] (work)       Thanks

## 2022-11-30 NOTE — TELEPHONE ENCOUNTER
----- Message from Carlita Shaw sent at 11/30/2022 11:59 AM CST -----  Contact: Jose R Odell would like a call back at [phone] in regards to getting medication, meloxicam. Medication wasn't on patient medication list but he wants it to help with he gout and would like it sent to   [facility]  [address] [place] [address] [phone] (phone)  [phone] (fax)    Thanks  Am

## 2022-12-12 ENCOUNTER — PATIENT OUTREACH (OUTPATIENT)
Dept: ADMINISTRATIVE | Facility: HOSPITAL | Age: 72
End: 2022-12-12
Payer: MEDICARE

## 2022-12-12 NOTE — PROGRESS NOTES
Working DM Eye Report:     Patient overdue for DM eye exam.  No answer, unable to leave message.

## 2022-12-30 ENCOUNTER — LAB VISIT (OUTPATIENT)
Dept: LAB | Facility: HOSPITAL | Age: 72
End: 2022-12-30
Attending: FAMILY MEDICINE
Payer: MEDICARE

## 2022-12-30 DIAGNOSIS — G63 NEUROPATHY DUE TO MEDICAL CONDITION: ICD-10-CM

## 2022-12-30 DIAGNOSIS — Z79.899 ENCOUNTER FOR LONG-TERM (CURRENT) USE OF MEDICATIONS: Chronic | ICD-10-CM

## 2022-12-30 DIAGNOSIS — Z12.5 ENCOUNTER FOR PROSTATE CANCER SCREENING: ICD-10-CM

## 2022-12-30 DIAGNOSIS — R73.03 PREDIABETES: Chronic | ICD-10-CM

## 2022-12-30 DIAGNOSIS — I69.354 HEMIPARESIS OF LEFT NONDOMINANT SIDE AS LATE EFFECT OF CEREBRAL INFARCTION: ICD-10-CM

## 2022-12-30 DIAGNOSIS — E78.2 MIXED HYPERLIPIDEMIA: Chronic | ICD-10-CM

## 2022-12-30 PROCEDURE — 83036 HEMOGLOBIN GLYCOSYLATED A1C: CPT | Performed by: FAMILY MEDICINE

## 2022-12-30 PROCEDURE — 85027 COMPLETE CBC AUTOMATED: CPT | Performed by: FAMILY MEDICINE

## 2022-12-30 PROCEDURE — 84153 ASSAY OF PSA TOTAL: CPT | Performed by: FAMILY MEDICINE

## 2022-12-30 PROCEDURE — 84443 ASSAY THYROID STIM HORMONE: CPT | Performed by: FAMILY MEDICINE

## 2022-12-30 PROCEDURE — 36415 COLL VENOUS BLD VENIPUNCTURE: CPT | Mod: PO | Performed by: FAMILY MEDICINE

## 2022-12-30 PROCEDURE — 80061 LIPID PANEL: CPT | Performed by: FAMILY MEDICINE

## 2022-12-30 PROCEDURE — 80053 COMPREHEN METABOLIC PANEL: CPT | Performed by: FAMILY MEDICINE

## 2022-12-31 LAB
ALBUMIN SERPL BCP-MCNC: 3.6 G/DL (ref 3.5–5.2)
ALP SERPL-CCNC: 100 U/L (ref 55–135)
ALT SERPL W/O P-5'-P-CCNC: 16 U/L (ref 10–44)
ANION GAP SERPL CALC-SCNC: 13 MMOL/L (ref 8–16)
AST SERPL-CCNC: 17 U/L (ref 10–40)
BILIRUB SERPL-MCNC: 0.7 MG/DL (ref 0.1–1)
BUN SERPL-MCNC: 22 MG/DL (ref 8–23)
CALCIUM SERPL-MCNC: 9.9 MG/DL (ref 8.7–10.5)
CHLORIDE SERPL-SCNC: 98 MMOL/L (ref 95–110)
CHOLEST SERPL-MCNC: 182 MG/DL (ref 120–199)
CHOLEST/HDLC SERPL: 4.3 {RATIO} (ref 2–5)
CO2 SERPL-SCNC: 27 MMOL/L (ref 23–29)
COMPLEXED PSA SERPL-MCNC: 0.81 NG/ML (ref 0–4)
CREAT SERPL-MCNC: 1.5 MG/DL (ref 0.5–1.4)
ERYTHROCYTE [DISTWIDTH] IN BLOOD BY AUTOMATED COUNT: 13 % (ref 11.5–14.5)
EST. GFR  (NO RACE VARIABLE): 49.2 ML/MIN/1.73 M^2
ESTIMATED AVG GLUCOSE: 180 MG/DL (ref 68–131)
GLUCOSE SERPL-MCNC: 149 MG/DL (ref 70–110)
HBA1C MFR BLD: 7.9 % (ref 4–5.6)
HCT VFR BLD AUTO: 46.2 % (ref 40–54)
HDLC SERPL-MCNC: 42 MG/DL (ref 40–75)
HDLC SERPL: 23.1 % (ref 20–50)
HGB BLD-MCNC: 14.5 G/DL (ref 14–18)
LDLC SERPL CALC-MCNC: 123.6 MG/DL (ref 63–159)
MCH RBC QN AUTO: 27.4 PG (ref 27–31)
MCHC RBC AUTO-ENTMCNC: 31.4 G/DL (ref 32–36)
MCV RBC AUTO: 87 FL (ref 82–98)
NONHDLC SERPL-MCNC: 140 MG/DL
PLATELET # BLD AUTO: 279 K/UL (ref 150–450)
PMV BLD AUTO: 10.8 FL (ref 9.2–12.9)
POTASSIUM SERPL-SCNC: 3.3 MMOL/L (ref 3.5–5.1)
PROT SERPL-MCNC: 8.1 G/DL (ref 6–8.4)
RBC # BLD AUTO: 5.29 M/UL (ref 4.6–6.2)
SODIUM SERPL-SCNC: 138 MMOL/L (ref 136–145)
TRIGL SERPL-MCNC: 82 MG/DL (ref 30–150)
TSH SERPL DL<=0.005 MIU/L-ACNC: 1.39 UIU/ML (ref 0.4–4)
WBC # BLD AUTO: 6.71 K/UL (ref 3.9–12.7)

## 2023-01-10 ENCOUNTER — OFFICE VISIT (OUTPATIENT)
Dept: FAMILY MEDICINE | Facility: CLINIC | Age: 73
End: 2023-01-10
Payer: MEDICARE

## 2023-01-10 ENCOUNTER — PATIENT OUTREACH (OUTPATIENT)
Dept: ADMINISTRATIVE | Facility: HOSPITAL | Age: 73
End: 2023-01-10
Payer: MEDICARE

## 2023-01-10 VITALS
TEMPERATURE: 98 F | HEART RATE: 71 BPM | HEIGHT: 65 IN | DIASTOLIC BLOOD PRESSURE: 75 MMHG | WEIGHT: 163.38 LBS | BODY MASS INDEX: 27.22 KG/M2 | SYSTOLIC BLOOD PRESSURE: 133 MMHG

## 2023-01-10 DIAGNOSIS — E11.69 HYPERLIPIDEMIA ASSOCIATED WITH TYPE 2 DIABETES MELLITUS: ICD-10-CM

## 2023-01-10 DIAGNOSIS — G31.9 DEGENERATIVE DISEASE OF NERVOUS SYSTEM, UNSPECIFIED: ICD-10-CM

## 2023-01-10 DIAGNOSIS — N18.31 STAGE 3A CHRONIC KIDNEY DISEASE: ICD-10-CM

## 2023-01-10 DIAGNOSIS — I73.9 PERIPHERAL VASCULAR DISEASE, UNSPECIFIED: ICD-10-CM

## 2023-01-10 DIAGNOSIS — Z79.899 ENCOUNTER FOR LONG-TERM (CURRENT) USE OF MEDICATIONS: ICD-10-CM

## 2023-01-10 DIAGNOSIS — Z23 NEED FOR INFLUENZA VACCINATION: ICD-10-CM

## 2023-01-10 DIAGNOSIS — I69.959 HEMIPLEGIA AND HEMIPARESIS FOLLOWING UNSPECIFIED CEREBROVASCULAR DISEASE AFFECTING UNSPECIFIED SIDE: ICD-10-CM

## 2023-01-10 DIAGNOSIS — E87.6 HYPOKALEMIA: ICD-10-CM

## 2023-01-10 DIAGNOSIS — E78.5 HYPERLIPIDEMIA ASSOCIATED WITH TYPE 2 DIABETES MELLITUS: ICD-10-CM

## 2023-01-10 DIAGNOSIS — E11.65 TYPE 2 DIABETES MELLITUS WITH HYPERGLYCEMIA, WITHOUT LONG-TERM CURRENT USE OF INSULIN: ICD-10-CM

## 2023-01-10 DIAGNOSIS — G63 NEUROPATHY DUE TO MEDICAL CONDITION: Primary | ICD-10-CM

## 2023-01-10 PROBLEM — Z12.5 SCREENING PSA (PROSTATE SPECIFIC ANTIGEN): Status: RESOLVED | Noted: 2019-10-18 | Resolved: 2023-01-10

## 2023-01-10 PROBLEM — Z11.59 NEED FOR HEPATITIS C SCREENING TEST: Status: RESOLVED | Noted: 2019-07-19 | Resolved: 2023-01-10

## 2023-01-10 PROBLEM — R73.03 PREDIABETES: Chronic | Status: RESOLVED | Noted: 2019-07-19 | Resolved: 2023-01-10

## 2023-01-10 PROBLEM — Z12.11 COLON CANCER SCREENING: Status: RESOLVED | Noted: 2019-07-19 | Resolved: 2023-01-10

## 2023-01-10 PROCEDURE — 1126F PR PAIN SEVERITY QUANTIFIED, NO PAIN PRESENT: ICD-10-PCS | Mod: CPTII,S$GLB,, | Performed by: FAMILY MEDICINE

## 2023-01-10 PROCEDURE — 1101F PT FALLS ASSESS-DOCD LE1/YR: CPT | Mod: CPTII,S$GLB,, | Performed by: FAMILY MEDICINE

## 2023-01-10 PROCEDURE — 90694 VACC AIIV4 NO PRSRV 0.5ML IM: CPT | Mod: S$GLB,,, | Performed by: FAMILY MEDICINE

## 2023-01-10 PROCEDURE — 99999 PR PBB SHADOW E&M-EST. PATIENT-LVL IV: ICD-10-PCS | Mod: PBBFAC,,, | Performed by: FAMILY MEDICINE

## 2023-01-10 PROCEDURE — 99214 PR OFFICE/OUTPT VISIT, EST, LEVL IV, 30-39 MIN: ICD-10-PCS | Mod: S$GLB,,, | Performed by: FAMILY MEDICINE

## 2023-01-10 PROCEDURE — G0008 FLU VACCINE - QUADRIVALENT - ADJUVANTED: ICD-10-PCS | Mod: S$GLB,,, | Performed by: FAMILY MEDICINE

## 2023-01-10 PROCEDURE — 3008F BODY MASS INDEX DOCD: CPT | Mod: CPTII,S$GLB,, | Performed by: FAMILY MEDICINE

## 2023-01-10 PROCEDURE — 99214 OFFICE O/P EST MOD 30 MIN: CPT | Mod: S$GLB,,, | Performed by: FAMILY MEDICINE

## 2023-01-10 PROCEDURE — 3078F PR MOST RECENT DIASTOLIC BLOOD PRESSURE < 80 MM HG: ICD-10-PCS | Mod: CPTII,S$GLB,, | Performed by: FAMILY MEDICINE

## 2023-01-10 PROCEDURE — 1159F MED LIST DOCD IN RCRD: CPT | Mod: CPTII,S$GLB,, | Performed by: FAMILY MEDICINE

## 2023-01-10 PROCEDURE — 3075F SYST BP GE 130 - 139MM HG: CPT | Mod: CPTII,S$GLB,, | Performed by: FAMILY MEDICINE

## 2023-01-10 PROCEDURE — 90694 FLU VACCINE - QUADRIVALENT - ADJUVANTED: ICD-10-PCS | Mod: S$GLB,,, | Performed by: FAMILY MEDICINE

## 2023-01-10 PROCEDURE — 3075F PR MOST RECENT SYSTOLIC BLOOD PRESS GE 130-139MM HG: ICD-10-PCS | Mod: CPTII,S$GLB,, | Performed by: FAMILY MEDICINE

## 2023-01-10 PROCEDURE — 3288F FALL RISK ASSESSMENT DOCD: CPT | Mod: CPTII,S$GLB,, | Performed by: FAMILY MEDICINE

## 2023-01-10 PROCEDURE — 3008F PR BODY MASS INDEX (BMI) DOCUMENTED: ICD-10-PCS | Mod: CPTII,S$GLB,, | Performed by: FAMILY MEDICINE

## 2023-01-10 PROCEDURE — 1126F AMNT PAIN NOTED NONE PRSNT: CPT | Mod: CPTII,S$GLB,, | Performed by: FAMILY MEDICINE

## 2023-01-10 PROCEDURE — 99999 PR PBB SHADOW E&M-EST. PATIENT-LVL IV: CPT | Mod: PBBFAC,,, | Performed by: FAMILY MEDICINE

## 2023-01-10 PROCEDURE — 1101F PR PT FALLS ASSESS DOC 0-1 FALLS W/OUT INJ PAST YR: ICD-10-PCS | Mod: CPTII,S$GLB,, | Performed by: FAMILY MEDICINE

## 2023-01-10 PROCEDURE — 3078F DIAST BP <80 MM HG: CPT | Mod: CPTII,S$GLB,, | Performed by: FAMILY MEDICINE

## 2023-01-10 PROCEDURE — 1159F PR MEDICATION LIST DOCUMENTED IN MEDICAL RECORD: ICD-10-PCS | Mod: CPTII,S$GLB,, | Performed by: FAMILY MEDICINE

## 2023-01-10 PROCEDURE — G0008 ADMIN INFLUENZA VIRUS VAC: HCPCS | Mod: S$GLB,,, | Performed by: FAMILY MEDICINE

## 2023-01-10 PROCEDURE — 3288F PR FALLS RISK ASSESSMENT DOCUMENTED: ICD-10-PCS | Mod: CPTII,S$GLB,, | Performed by: FAMILY MEDICINE

## 2023-01-10 RX ORDER — POTASSIUM CHLORIDE 750 MG/1
10 CAPSULE, EXTENDED RELEASE ORAL DAILY
Qty: 90 CAPSULE | Refills: 3 | Status: SHIPPED | OUTPATIENT
Start: 2023-01-10 | End: 2024-01-09

## 2023-01-10 RX ORDER — ATORVASTATIN CALCIUM 80 MG/1
80 TABLET, FILM COATED ORAL NIGHTLY
Qty: 90 TABLET | Refills: 4 | Status: SHIPPED | OUTPATIENT
Start: 2023-01-10

## 2023-01-10 RX ORDER — GABAPENTIN 400 MG/1
400 CAPSULE ORAL 3 TIMES DAILY
Qty: 270 CAPSULE | Refills: 4 | Status: SHIPPED | OUTPATIENT
Start: 2023-01-10 | End: 2024-01-29 | Stop reason: SDUPTHER

## 2023-01-10 RX ORDER — METFORMIN HYDROCHLORIDE 500 MG/1
TABLET, EXTENDED RELEASE ORAL
Qty: 90 TABLET | Refills: 4 | Status: SHIPPED | OUTPATIENT
Start: 2023-01-10

## 2023-01-10 RX ORDER — GABAPENTIN 400 MG/1
400 CAPSULE ORAL 3 TIMES DAILY
COMMUNITY
End: 2023-01-10 | Stop reason: SDUPTHER

## 2023-01-10 NOTE — PATIENT INSTRUCTIONS
Follow up in about 6 months (around 7/10/2023), or if symptoms worsen or fail to improve, for DM.     Dear patient,   As a result of recent federal legislation (The Federal Cures Act), you may receive lab or pathology results from your visit in your [facility] account before your physician is able to contact you. Your physician or their representative will relay the results to you with their recommendations at their soonest availability.     If no improvement in symptoms or symptoms worsen, please be advised to call MD, follow-up at clinic and/or go to ER if becomes severe.    Landon Smith M.D.        We Offer TELEHEALTH & Same Day Appointments!   Book your Telehealth appointment with me through my nurse or   Clinic appointments on [employer]!    [address] [place] [address]    Office: [phone]   FAX: [phone]    Check out my Facebook Page and Follow Me at: [email or web address]    Check out my website at [employer] by clicking on: [email or web address]    To Schedule appointments online, go to [facility]: [email or web address]

## 2023-01-10 NOTE — ASSESSMENT & PLAN NOTE
Increase compliance with diet and exercise and medications.  We will plan to monitor hemoglobin A1c at designated intervals 3 to 6 months.  I recommend ongoing Education for diabetic diet and exercise protocol.  We will continue to monitor for side effects.    Please be advised of symptoms to monitor for and to notify me immediately if persistent or worsening.  Follow up with Ophthalmology/Optometry and Podiatry at least annually.

## 2023-01-10 NOTE — ASSESSMENT & PLAN NOTE
I have changed in the prescription for gabapentin to reflect what he is currently taking patient reports intermittent compliance.  No side effects reported.

## 2023-01-10 NOTE — ASSESSMENT & PLAN NOTE
I will give him 3 to 6 months to correct his LDL.  May add Zetia if no improvement.Target LDL lower.  Continue 80 milligrams atorvastatin.  Increase compliance daily.  If no improvement add Zetia.  Counseled on hyperlipidemia disease course, healthy diet and increased need for exercise.  Please be advised of the risk of cardiovascular disease, increase stroke and heart attack risk with uncontrolled/untreated hyperlipidemia.     Patient voiced understanding and understood the treatment plan. All questions were answered.

## 2023-01-10 NOTE — PROGRESS NOTES
PLAN:      Problem List Items Addressed This Visit       Hyperlipidemia associated with type 2 diabetes mellitus (Chronic)        I will give him 3 to 6 months to correct his LDL.  May add Zetia if no improvement.Target LDL lower.  Continue 80 milligrams atorvastatin.  Increase compliance daily.  If no improvement add Zetia.  Counseled on hyperlipidemia disease course, healthy diet and increased need for exercise.  Please be advised of the risk of cardiovascular disease, increase stroke and heart attack risk with uncontrolled/untreated hyperlipidemia.     Patient voiced understanding and understood the treatment plan. All questions were answered.              Relevant Medications    atorvastatin (LIPITOR) 80 MG tablet    metFORMIN (GLUCOPHAGE-XR) 500 MG ER 24hr tablet    Hypokalemia (Chronic)     Increase potassium supplement.  Likely due to chlorthalidone.  Hypokalemia precautions.         Relevant Medications    potassium chloride (MICRO-K) 10 MEQ CpSR    Encounter for long-term (current) use of medications (Chronic)     Complete history and physical was completed today.  Complete and thorough medication reconciliation was performed.  Discussed risks and benefits of medications.  Advised patient on orders and health maintenance.  We discussed old records and old labs if available.  Will request any records not available through epic.  Continue current medications listed on your summary sheet.           Relevant Medications    atorvastatin (LIPITOR) 80 MG tablet    metFORMIN (GLUCOPHAGE-XR) 500 MG ER 24hr tablet    potassium chloride (MICRO-K) 10 MEQ CpSR    Stage 3a chronic kidney disease (Chronic)     Decreased kidney function on recent blood work.  Increase hydration with water.  Avoid anti-inflammatory medications excessively.         Relevant Medications    metFORMIN (GLUCOPHAGE-XR) 500 MG ER 24hr tablet    Type 2 diabetes mellitus with hyperglycemia, without long-term current use of insulin (Chronic)      Increase compliance with diet and exercise and medications.  We will plan to monitor hemoglobin A1c at designated intervals 3 to 6 months.  I recommend ongoing Education for diabetic diet and exercise protocol.  We will continue to monitor for side effects.    Please be advised of symptoms to monitor for and to notify me immediately if persistent or worsening.  Follow up with Ophthalmology/Optometry and Podiatry at least annually.           Relevant Medications    metFORMIN (GLUCOPHAGE-XR) 500 MG ER 24hr tablet    Peripheral vascular disease, unspecified (Chronic)      Continue follow-up with Cardiology.  Continue baby aspirin.         Hemiplegia and hemiparesis following unspecified cerebrovascular disease affecting unspecified side (Chronic)     Discussed compliance with diet exercise and medications.  Stroke precautions.         Need for influenza vaccination    Relevant Orders    Influenza (FLUAD) - Quadrivalent (Adjuvanted) *Preferred* (65+) (PF) (Completed)    Degenerative disease of nervous system, unspecified - Primary     I have changed in the prescription for gabapentin to reflect what he is currently taking patient reports intermittent compliance.  No side effects reported.         Relevant Medications    gabapentin (NEURONTIN) 400 MG capsule     Future Appointments       Date Provider Specialty Appt Notes    7/3/2023  Lab     7/11/2023 Landon Smith MD Family Medicine 6 month follow up - diabetes              Medication Management for assessment above:   Medication List with Changes/Refills   Current Medications    AMLODIPINE (NORVASC) 10 MG TABLET    TAKE 1 TABLET DAILY    ASPIRIN (ECOTRIN) 81 MG EC TABLET    Chew 1 tablet (81 mg total) by mouth daily    CHLORTHALIDONE (HYGROTEN) 25 MG TAB    TAKE 1 TABLET DAILY    FAMOTIDINE (PEPCID) 20 MG TABLET    TAKE 1 TABLET TWICE A DAY   Changed and/or Refilled Medications    Modified Medication Previous Medication    ATORVASTATIN (LIPITOR) 80 MG TABLET  atorvastatin (LIPITOR) 80 MG tablet       Take 1 tablet (80 mg total) by mouth every evening. TAKE 1 TABLET DAILY    Take 1 tablet (80 mg total) by mouth every evening. TAKE 1 TABLET DAILY    GABAPENTIN (NEURONTIN) 400 MG CAPSULE gabapentin (NEURONTIN) 400 MG capsule       Take 1 capsule (400 mg total) by mouth 3 (three) times daily.    Take 400 mg by mouth 3 (three) times daily.    METFORMIN (GLUCOPHAGE-XR) 500 MG ER 24HR TABLET metFORMIN (GLUCOPHAGE-XR) 500 MG ER 24hr tablet       TAKE 1 TABLET DAILY WITH BREAKFAST    TAKE 1 TABLET DAILY WITH BREAKFAST    POTASSIUM CHLORIDE (MICRO-K) 10 MEQ CPSR potassium chloride (MICRO-K) 10 MEQ CpSR       Take 1 capsule (10 mEq total) by mouth once daily.    TAKE 1 CAPSULE DAILY   Discontinued Medications    GABAPENTIN (NEURONTIN) 100 MG CAPSULE    Take 1 capsule (100 mg total) by mouth 3 (three) times daily. Take in addition to the 300mg    GABAPENTIN (NEURONTIN) 300 MG CAPSULE    Take 1 capsule (300 mg total) by mouth 3 (three) times daily.    MELOXICAM (MOBIC) 15 MG TABLET    Take 1 tablet (15 mg total) by mouth once daily.       Landon Smith M.D.  ==========================================================================  Subjective:   Patient ID: Jose R Coffman is a 72 y.o. male.  has a past medical history of Hyperlipidemia, Hypertension, and Stroke.   Chief Complaint: Follow-up      Problem List Items Addressed This Visit       Hyperlipidemia associated with type 2 diabetes mellitus (Chronic)    Overview     January 2023:  Patient reports noncompliance with diet and medications.  December 2021:  LDL has improved from previous year but still above goal.  Increase compliance.  December 2020:  Compliant with 40 mg of atorvastatin daily.  Will recheck lipids today.JUNE 2020:  Updating fasting lipid panel today.  Patient doing well on atorvastatin 40 mg.  LDL at goal.December 2019:  Patient due for updated lipid panel.  Patient is compliant with atorvastatin.  No side  effects reported.  CHRONIC. STABLE. Lab analysis reviewed.   (-) CP, SOB, abdominal pain, N/V/D, constipation, jaundice, skin changes.  (-) Myalgias  Lab Results   Component Value Date    CHOL 182 12/30/2022    CHOL 174 12/13/2021    CHOL 202 (H) 12/28/2020     Lab Results   Component Value Date    HDL 42 12/30/2022    HDL 37 (L) 12/13/2021    HDL 42 12/28/2020     Lab Results   Component Value Date    LDLCALC 123.6 12/30/2022    LDLCALC 115.6 12/13/2021    LDLCALC 139.6 12/28/2020     Lab Results   Component Value Date    TRIG 82 12/30/2022    TRIG 107 12/13/2021    TRIG 102 12/28/2020     Lab Results   Component Value Date    CHOLHDL 23.1 12/30/2022    CHOLHDL 21.3 12/13/2021    CHOLHDL 20.8 12/28/2020     Lab Results   Component Value Date    TOTALCHOLEST 4.3 12/30/2022    TOTALCHOLEST 4.7 12/13/2021    TOTALCHOLEST 4.8 12/28/2020     Lab Results   Component Value Date    ALT 16 12/30/2022    AST 17 12/30/2022    ALKPHOS 100 12/30/2022    BILITOT 0.7 12/30/2022   ======================================================           Current Assessment & Plan        I will give him 3 to 6 months to correct his LDL.  May add Zetia if no improvement.Target LDL lower.  Continue 80 milligrams atorvastatin.  Increase compliance daily.  If no improvement add Zetia.  Counseled on hyperlipidemia disease course, healthy diet and increased need for exercise.  Please be advised of the risk of cardiovascular disease, increase stroke and heart attack risk with uncontrolled/untreated hyperlipidemia.     Patient voiced understanding and understood the treatment plan. All questions were answered.              Hypokalemia (Chronic)    Overview     Chronic.  Persistent.  Patient reports compliance with potassium supplementation.  Likely due to medications.  Lab Results   Component Value Date    K 3.3 (L) 12/30/2022              Current Assessment & Plan     Increase potassium supplement.  Likely due to chlorthalidone.  Hypokalemia  precautions.         Encounter for long-term (current) use of medications (Chronic)    Overview     January 2023: Reviewed labs.  07/19/2019 CHRONIC long-term drug therapy for managed conditions. See medication list. Reports compliance.  No side effects reported.  Routine lab work is being monitored.  Patient does not need refills today. Reviewed blood work from recent hospitalization.  Patient has hyperglycemia without recent A1c.  Patient was prediabetic years ago.  Not currently on metformin.  Creatinine was preserved.  Blood counts were normal.TSH has been normal in the past.DECEMBER 2019:  Plan is to update labs today.  Patient is compliant with medications.  Patient has stop Januvia due to cost.  Back on metformin.  Patient is tolerating at night.CHRONIC. Stable. Compliant with medications for managed conditions. See medication list. No SE reported. Routine lab analysis is being monitored. Refills were addressed.  JUNE 2020:  Off metformin to GI side effects.CHRONIC. Stable. Compliant with medications for managed conditions. See medication list.Routine lab analysis is being monitored. Refills were addressed.DECEMBER 2020:  CHRONIC. Stable. Compliant with medications for managed conditions. See medication list. No SE reported. Routine lab analysis is being monitored. Refills were addressed.  June 2021:  Reviewed labs.  Lab Results   Component Value Date    WBC 6.71 12/30/2022    HGB 14.5 12/30/2022    HCT 46.2 12/30/2022    MCV 87 12/30/2022     12/30/2022       Chemistry        Component Value Date/Time     12/30/2022 0742     06/13/2019 0000    K 3.3 (L) 12/30/2022 0742    K 4.2 06/13/2019 0000    CL 98 12/30/2022 0742     06/13/2019 0000    CO2 27 12/30/2022 0742    CO2 23 06/13/2019 0000    BUN 22 12/30/2022 0742    BUN 14 06/13/2019 0000    CREATININE 1.5 (H) 12/30/2022 0742    CREATININE 1.2 06/13/2019 0000     (H) 12/30/2022 0742        Component Value Date/Time    CALCIUM  9.9 12/30/2022 0742    CALCIUM 8.6 06/13/2019 0000    ALKPHOS 100 12/30/2022 0742    AST 17 12/30/2022 0742    ALT 16 12/30/2022 0742    BILITOT 0.7 12/30/2022 0742    ESTGFRAFRICA >60.0 06/28/2022 1110    EGFRNONAA 54.5 (A) 06/28/2022 1110          Lab Results   Component Value Date    TSH 1.389 12/30/2022          Current Assessment & Plan     Complete history and physical was completed today.  Complete and thorough medication reconciliation was performed.  Discussed risks and benefits of medications.  Advised patient on orders and health maintenance.  We discussed old records and old labs if available.  Will request any records not available through epic.  Continue current medications listed on your summary sheet.           Stage 3a chronic kidney disease (Chronic)    Overview     Lab Results   Component Value Date    CREATININE 1.5 (H) 12/30/2022    BUN 22 12/30/2022     12/30/2022    K 3.3 (L) 12/30/2022    CL 98 12/30/2022    CO2 27 12/30/2022   BMP  Lab Results   Component Value Date     12/30/2022    K 3.3 (L) 12/30/2022    CL 98 12/30/2022    CO2 27 12/30/2022    BUN 22 12/30/2022    CREATININE 1.5 (H) 12/30/2022    CALCIUM 9.9 12/30/2022    ANIONGAP 13 12/30/2022    ESTGFRAFRICA >60.0 06/28/2022    EGFRNONAA 54.5 (A) 06/28/2022            Current Assessment & Plan     Decreased kidney function on recent blood work.  Increase hydration with water.  Avoid anti-inflammatory medications excessively.         Type 2 diabetes mellitus with hyperglycemia, without long-term current use of insulin (Chronic)    Overview     January 2023: Patient reports noncompliance with diet.  He also reports intermittent compliance with his metformin.  Diabetes Management StatusStatin: TakingACE/ARB: Not taking    June 2022:Diabetes Management StatusStatin: TakingACE/ARB: Not taking    Diabetes Management Status    Statin: Taking  ACE/ARB: Not taking    Screening or Prevention Patient's value Goal Complete/Controlled?    HgA1C Testing and Control   Lab Results   Component Value Date    HGBA1C 7.9 (H) 12/30/2022      Annually/Less than 8% Yes   Lipid profile : 12/30/2022 Annually Yes   LDL control Lab Results   Component Value Date    LDLCALC 123.6 12/30/2022    Annually/Less than 100 mg/dl  No   Nephropathy screening Lab Results   Component Value Date    LABMICR 10.0 06/28/2022     No results found for: PROTEINUA  No results found for: UTPCR   Annually Yes   Blood pressure BP Readings from Last 1 Encounters:   01/10/23 133/75    Less than 140/90 Yes   Dilated retinal exam : 01/09/2020 Annually No   Foot exam   : 01/10/2023 Annually Yes            Current Assessment & Plan     Increase compliance with diet and exercise and medications.  We will plan to monitor hemoglobin A1c at designated intervals 3 to 6 months.  I recommend ongoing Education for diabetic diet and exercise protocol.  We will continue to monitor for side effects.    Please be advised of symptoms to monitor for and to notify me immediately if persistent or worsening.  Follow up with Ophthalmology/Optometry and Podiatry at least annually.           Peripheral vascular disease, unspecified (Chronic)    Overview       Chronic.  Stable.  Followed by Cardiology.  He is taking a baby aspirin         Current Assessment & Plan      Continue follow-up with Cardiology.  Continue baby aspirin.         Hemiplegia and hemiparesis following unspecified cerebrovascular disease affecting unspecified side (Chronic)    Overview       Chronic.  Stable.  Patient   Has history of stroke.         Current Assessment & Plan     Discussed compliance with diet exercise and medications.  Stroke precautions.         Need for influenza vaccination    Degenerative disease of nervous system, unspecified - Primary    Overview     January 2023: Patient needing refill on his medications.  He reports intermittent compliance with medications.  Gabapentin has been coming in two different doses 300 100  however he has been taking the 400 milligram previously.  He is requesting the 400 milligram capsules.    June 2022:   Patient reports no change in HPI.  Initial HPI:  Chronic.  Worsening since his stroke.  Also associated with diabetes.  Patient reports sharp shooting pain in his left arm and leg intermittently.  Patient is requesting something for the pain.  December 2020:  Patient reports compliance with gabapentin.  Intermittent control.  June 2021:  Still having chronic Repatha pain that is controlled with gabapentin.  Patient complaining of left shoulder stiffness that is chronic.  His strength is stable and improved from previous visit.  December 2021: Continue gabapentin.  Reports compliance.  No side effects reported.  Symptoms are controlled.         Current Assessment & Plan     I have changed in the prescription for gabapentin to reflect what he is currently taking patient reports intermittent compliance.  No side effects reported.             Review of patient's allergies indicates:   Allergen Reactions    Lisinopril      Current Outpatient Medications   Medication Instructions    amLODIPine (NORVASC) 10 MG tablet TAKE 1 TABLET DAILY    aspirin (ECOTRIN) 81 MG EC tablet Chew 1 tablet (81 mg total) by mouth daily    atorvastatin (LIPITOR) 80 mg, Oral, Nightly, TAKE 1 TABLET DAILY    chlorthalidone (HYGROTEN) 25 MG Tab TAKE 1 TABLET DAILY    famotidine (PEPCID) 20 MG tablet TAKE 1 TABLET TWICE A DAY    gabapentin (NEURONTIN) 400 mg, Oral, 3 times daily    metFORMIN (GLUCOPHAGE-XR) 500 MG ER 24hr tablet TAKE 1 TABLET DAILY WITH BREAKFAST    potassium chloride (MICRO-K) 10 MEQ CpSR 10 mEq, Oral, Daily      I have reviewed the PMH, social history, FamilyHx, surgical history, allergies and medications documented / confirmed by the patient at the time of this visit.  Review of Systems   Constitutional:  Negative for chills, fatigue, fever and unexpected weight change.   HENT:  Negative for ear pain and sore  "throat.    Eyes:  Negative for redness and visual disturbance.   Respiratory:  Negative for cough and shortness of breath.    Cardiovascular:  Negative for chest pain and palpitations.   Gastrointestinal:  Negative for nausea and vomiting.   Endocrine: Negative for cold intolerance and heat intolerance.   Genitourinary:  Negative for difficulty urinating and hematuria.   Musculoskeletal:  Positive for arthralgias. Negative for myalgias.   Skin:  Negative for rash and wound.   Allergic/Immunologic: Negative for environmental allergies and food allergies.   Neurological:  Positive for weakness ( chronic). Negative for headaches.   Hematological:  Negative for adenopathy. Does not bruise/bleed easily.   Psychiatric/Behavioral:  Negative for sleep disturbance. The patient is not nervous/anxious.    Objective:   /75   Pulse 71   Temp 98 °F (36.7 °C)   Ht 5' 5" (1.651 m)   Wt 74.1 kg (163 lb 5.8 oz)   BMI 27.18 kg/m²   Physical Exam  Vitals and nursing note reviewed.   Constitutional:       General: He is not in acute distress.     Appearance: He is well-developed.   HENT:      Head: Normocephalic and atraumatic.   Eyes:      Pupils: Pupils are equal, round, and reactive to light.   Cardiovascular:      Rate and Rhythm: Normal rate and regular rhythm.      Heart sounds: Murmur heard.   Pulmonary:      Effort: Pulmonary effort is normal. No respiratory distress.      Breath sounds: Normal breath sounds. No wheezing.   Abdominal:      General: Bowel sounds are normal. There is no distension.      Palpations: Abdomen is soft.   Musculoskeletal:         General: Normal range of motion.      Cervical back: Normal range of motion and neck supple.   Skin:     General: Skin is warm and dry.      Capillary Refill: Capillary refill takes less than 2 seconds.   Neurological:      Mental Status: He is alert and oriented to person, place, and time.      Cranial Nerves: No cranial nerve deficit.      Sensory: No sensory " deficit.      Motor: No abnormal muscle tone or seizure activity.      Coordination: Coordination normal.      Gait: Gait normal.      Comments: Chronic mild Left upper extremity weakness No slurring of speech.   Psychiatric:         Behavior: Behavior normal.        Assessment:     1. Neuropathy due to medical condition    2. Encounter for long-term (current) use of medications    3. Hyperlipidemia associated with type 2 diabetes mellitus    4. Type 2 diabetes mellitus with hyperglycemia, without long-term current use of insulin    5. Stage 3a chronic kidney disease    6. Hypokalemia    7. Need for influenza vaccination    8. Hemiplegia and hemiparesis following unspecified cerebrovascular disease affecting unspecified side    9. Peripheral vascular disease, unspecified    10. Degenerative disease of nervous system, unspecified      MDM:    moderate complexity.  Moderate risk.  Total time: 35 minutes.  This includes total time spent on the encounter, which includes face to face time and non-face to face time preparing to see the patient (eg, review of previous medical records, tests), Obtaining and/or reviewing separately obtained history, documenting clinical information in the electronic or other health record, independently interpreting results (not separately reported)/communicating results to the patient/family/caregiver, and/or care coordination (not separately reported).    I have Reviewed and summarized old records.  I have performed thorough medication reconciliation today and discussed risk and benefits of medications.  I have reviewed labs and discussed with patient.  All questions were answered.  I am requesting old records and will review them once they are available.  Cardiology    I have signed for the following orders AND/OR meds.  Orders Placed This Encounter   Procedures    Influenza (FLUAD) - Quadrivalent (Adjuvanted) *Preferred* (65+) (PF)     Medications Ordered This Encounter   Medications     atorvastatin (LIPITOR) 80 MG tablet     Sig: Take 1 tablet (80 mg total) by mouth every evening. TAKE 1 TABLET DAILY     Dispense:  90 tablet     Refill:  4    gabapentin (NEURONTIN) 400 MG capsule     Sig: Take 1 capsule (400 mg total) by mouth 3 (three) times daily.     Dispense:  270 capsule     Refill:  4    metFORMIN (GLUCOPHAGE-XR) 500 MG ER 24hr tablet     Sig: TAKE 1 TABLET DAILY WITH BREAKFAST     Dispense:  90 tablet     Refill:  4    potassium chloride (MICRO-K) 10 MEQ CpSR     Sig: Take 1 capsule (10 mEq total) by mouth once daily.     Dispense:  90 capsule     Refill:  3      Future Appointments       Date Provider Specialty Appt Notes    7/3/2023  Lab     7/11/2023 Landon Smith MD Family Medicine 6 month follow up - diabetes             Follow up in about 6 months (around 7/10/2023), or if symptoms worsen or fail to improve, for DM.    If no improvement in symptoms or symptoms worsen, advised to call/follow-up at clinic or go to ER. Patient voiced understanding and all questions/concerns were addressed.   DISCLAIMER: This note was compiled by using a speech recognition dictation system and therefore please be aware that typographical / speech recognition errors can and do occur.  Please contact me if you see any errors specifically.    Landon Smith M.D.       Office: [phone] [address] [place] [address]  FAX: [phone]

## 2023-01-10 NOTE — ASSESSMENT & PLAN NOTE
Decreased kidney function on recent blood work.  Increase hydration with water.  Avoid anti-inflammatory medications excessively.

## 2023-02-24 ENCOUNTER — PATIENT OUTREACH (OUTPATIENT)
Dept: ADMINISTRATIVE | Facility: HOSPITAL | Age: 73
End: 2023-02-24
Payer: MEDICARE

## 2023-02-24 DIAGNOSIS — N18.31 STAGE 3A CHRONIC KIDNEY DISEASE: Chronic | ICD-10-CM

## 2023-02-24 DIAGNOSIS — E11.65 TYPE 2 DIABETES MELLITUS WITH HYPERGLYCEMIA, WITHOUT LONG-TERM CURRENT USE OF INSULIN: Primary | Chronic | ICD-10-CM

## 2023-02-24 NOTE — PROGRESS NOTES
Working Diabetic-A1C Report.    A1C-7.9 current and has repeat scheduled for 7/03/23.  Micro albumin ordered and linked to appt.

## 2023-03-06 ENCOUNTER — TELEPHONE (OUTPATIENT)
Dept: FAMILY MEDICINE | Facility: CLINIC | Age: 73
End: 2023-03-06
Payer: MEDICARE

## 2023-03-06 NOTE — TELEPHONE ENCOUNTER
----- Message from Lyudmila West sent at 3/6/2023 10:52 AM CST -----  Contact: Jose R  Patient is calling to speak with nurse regarding medication. Reports feeling sick after taking metFORMIN (GLUCOPHAGE-XR) 500 MG ER 24hr tablet even when he eats beforehand. Would like to speak about getting on a different medication. Please give patient a call back at [phone].

## 2023-03-09 ENCOUNTER — TELEPHONE (OUTPATIENT)
Dept: FAMILY MEDICINE | Facility: CLINIC | Age: 73
End: 2023-03-09
Payer: MEDICARE

## 2023-03-09 NOTE — TELEPHONE ENCOUNTER
----- Message from Carlita Shaw sent at 3/9/2023  2:53 PM CST -----  Contact: Jsoe R  Type:  Patient Returning Call    Who Called:Jose R   Who Left Message for Patient:Zulay   Does the patient know what this is regarding?: medication issue   Would the patient rather a call back or a response via [facility]? Call back  Best Call Back Number:[phone]  Additional Information:    Thanks   Am

## 2023-03-13 ENCOUNTER — TELEPHONE (OUTPATIENT)
Dept: FAMILY MEDICINE | Facility: CLINIC | Age: 73
End: 2023-03-13
Payer: MEDICARE

## 2023-03-13 DIAGNOSIS — E11.65 TYPE 2 DIABETES MELLITUS WITH HYPERGLYCEMIA, WITHOUT LONG-TERM CURRENT USE OF INSULIN: Primary | ICD-10-CM

## 2023-03-13 NOTE — TELEPHONE ENCOUNTER
----- Message from Camila Kim sent at 3/13/2023 12:40 PM CDT -----  Contact: self [phone]  Patient called in this morning stating he has called several times and missed the call back as well. He would like to know can Dr. Smith prescribe him something other than metformin, he said it makes his nauseated even after he eats.Please call back [phone]

## 2023-03-13 NOTE — TELEPHONE ENCOUNTER
"Pt states " dr jimenez told me to let him know if I was having issue with the medicine , I need something instead of metformin is asking me feel sick and dizzy all te time , I need dr jimenez to prescribe something different " please advise . I did offer a visit with dania almanzar however he said " dr jimenez does not need to see me or his nurse , he told me he could change my meds "   "

## 2023-03-14 RX ORDER — SEMAGLUTIDE 1.34 MG/ML
INJECTION, SOLUTION SUBCUTANEOUS
Qty: 1 EACH | Refills: 1 | Status: SHIPPED | OUTPATIENT
Start: 2023-03-14 | End: 2023-04-13 | Stop reason: SDUPTHER

## 2023-03-14 NOTE — TELEPHONE ENCOUNTER
Patient states that he has been on metformin. He states that Januvia was called in a while ago and he was unable to get it due to cost. Patient is open to using any medications even injectables he just worried about cost.

## 2023-03-14 NOTE — TELEPHONE ENCOUNTER
I have signed for the following orders AND/OR meds.  Please call the patient and ask the patient to schedule the testing AND/OR inform about any medications that were sent.      No orders of the defined types were placed in this encounter.      Medications Ordered This Encounter   Medications    semaglutide (OZEMPIC) 0.25 mg or 0.5 mg(2 mg/1.5 mL) pen injector     Sig: Inject 0.25 mg into the skin once a week for 30 days, THEN 0.5 mg once a week.     Dispense:  1 each     Refill:  1

## 2023-03-14 NOTE — TELEPHONE ENCOUNTER
Please find out what other medications the patient has been on for diabetes?  See if he is willing to do a once weekly injection such as Ozempic.

## 2023-03-21 ENCOUNTER — TELEPHONE (OUTPATIENT)
Dept: FAMILY MEDICINE | Facility: CLINIC | Age: 73
End: 2023-03-21
Payer: MEDICARE

## 2023-03-21 DIAGNOSIS — E11.65 TYPE 2 DIABETES MELLITUS WITH HYPERGLYCEMIA, WITHOUT LONG-TERM CURRENT USE OF INSULIN: Primary | ICD-10-CM

## 2023-03-21 RX ORDER — LANCETS
1 EACH MISCELLANEOUS DAILY PRN
Qty: 100 EACH | Refills: 11 | Status: SHIPPED | OUTPATIENT
Start: 2023-03-21

## 2023-03-21 RX ORDER — INSULIN PUMP SYRINGE, 3 ML
EACH MISCELLANEOUS
Qty: 1 EACH | Refills: 0 | Status: SHIPPED | OUTPATIENT
Start: 2023-03-21

## 2023-03-21 NOTE — TELEPHONE ENCOUNTER
I have signed for the following orders AND/OR meds.  Please call the patient and ask the patient to schedule the testing AND/OR inform about any medications that were sent.      No orders of the defined types were placed in this encounter.      Medications Ordered This Encounter   Medications    blood sugar diagnostic (BLOOD GLUCOSE TEST) Strp     Sig: Use 1-2 x a day to check glucoses before meals.     Dispense:  100 strip     Refill:  11    blood-glucose meter kit     Sig: Use as instructed     Dispense:  1 each     Refill:  0    lancets Misc     Si Units by Misc.(Non-Drug; Combo Route) route daily as needed.     Dispense:  100 each     Refill:  11

## 2023-03-21 NOTE — TELEPHONE ENCOUNTER
----- Message from Yael Saha sent at 3/21/2023 12:56 PM CDT -----  Contact: Jose R Odell is calling to get a Glucose kit and test script  send over to   [facility] - [address] [address]  Phone: [phone] Fax: [phone]  Please call him back at [phone] in regards to the cost of his ozempic       Thanks  CF

## 2023-03-21 NOTE — TELEPHONE ENCOUNTER
Pt is calling to get a Glucose kit and test script  [facility] . I was not able to find the order for this on his md list . Please advise

## 2023-03-30 ENCOUNTER — TELEPHONE (OUTPATIENT)
Dept: FAMILY MEDICINE | Facility: CLINIC | Age: 73
End: 2023-03-30
Payer: MEDICARE

## 2023-03-30 NOTE — TELEPHONE ENCOUNTER
Patient concerned he will have to reuse needles for ozempic. Patient informed not to reuse needles and that he will get additional needles with each new refill. Patient v/u

## 2023-03-30 NOTE — TELEPHONE ENCOUNTER
----- Message from Idalia Montez sent at 3/30/2023  4:13 PM CDT -----  Contact: Jose R  Patient is calling ton speak with the nurse regarding questions and concerns on the Ozempic.Please give a call back at [phone] as requested.  Thanks  LR

## 2023-03-31 ENCOUNTER — TELEPHONE (OUTPATIENT)
Dept: ADMINISTRATIVE | Facility: HOSPITAL | Age: 73
End: 2023-03-31
Payer: MEDICARE

## 2023-04-13 DIAGNOSIS — E11.65 TYPE 2 DIABETES MELLITUS WITH HYPERGLYCEMIA, WITHOUT LONG-TERM CURRENT USE OF INSULIN: ICD-10-CM

## 2023-04-13 RX ORDER — SEMAGLUTIDE 1.34 MG/ML
INJECTION, SOLUTION SUBCUTANEOUS
Qty: 1 EACH | Refills: 1 | Status: SHIPPED | OUTPATIENT
Start: 2023-04-13 | End: 2023-06-12

## 2023-04-13 NOTE — TELEPHONE ENCOUNTER
Care Due:                  Date            Visit Type   Department     Provider  --------------------------------------------------------------------------------                                EP -                              PRIMARY      [facility] FAMILY  Last Visit: 01-      CARE ([facility])   MEDICINE       Landon Smith                               -                              PRIMARY      [facility] FAMILY  Next Visit: 07-      CARE ([facility])   [facility]       Landon Smith                                                            Last  Test          Frequency    Reason                     Performed    Due Date  --------------------------------------------------------------------------------    HBA1C.......  6 months...  metFORMIN, semaglutide...  12- 06-    [facility] Embedded Care Gaps. Reference number: [id number]. 4/13/2023   7:52:28 AM CDT

## 2023-04-13 NOTE — TELEPHONE ENCOUNTER
----- Message from Ximena Bhatia sent at 4/13/2023  7:41 AM CDT -----  Type:  RX Refill Request    Who Called: pt  Refill or New Rx:refill  RX Name and Strength:semaglutide (OZEMPIC)  How is the patient currently taking it? (ex. 1XDay):?  Is this a 30 day or 90 day RX:?  Preferred Pharmacy with phone number:[facility]  Local or Mail Order:local  Ordering Provider:Dr Smith  Would the patient rather a call back or a response via [facility]? call  Best Call Back Number:[phone]  Additional Information: States he is due a shot today, and its out. States he messed up a few dosage learning how to use it. He would like a call back.    Thank you

## 2023-05-25 ENCOUNTER — PES CALL (OUTPATIENT)
Dept: ADMINISTRATIVE | Facility: CLINIC | Age: 73
End: 2023-05-25
Payer: MEDICARE

## 2023-06-02 ENCOUNTER — PES CALL (OUTPATIENT)
Dept: ADMINISTRATIVE | Facility: CLINIC | Age: 73
End: 2023-06-02
Payer: MEDICARE

## 2023-06-20 RX ORDER — SEMAGLUTIDE 0.68 MG/ML
INJECTION, SOLUTION SUBCUTANEOUS
Qty: 3 ML | Refills: 1 | Status: SHIPPED | OUTPATIENT
Start: 2023-06-20 | End: 2024-02-21 | Stop reason: SDUPTHER

## 2023-06-28 ENCOUNTER — PATIENT OUTREACH (OUTPATIENT)
Dept: ADMINISTRATIVE | Facility: HOSPITAL | Age: 73
End: 2023-06-28
Payer: MEDICARE

## 2023-06-28 NOTE — PROGRESS NOTES
[facility] Report: patient is scheduled for labs (cmp and micro) on 7/3/23 and scheduled with PCP on 7/11/23 already.

## 2023-07-03 DIAGNOSIS — Z79.899 ENCOUNTER FOR LONG-TERM (CURRENT) USE OF MEDICATIONS: ICD-10-CM

## 2023-07-03 DIAGNOSIS — E11.59 HYPERTENSION ASSOCIATED WITH DIABETES: ICD-10-CM

## 2023-07-03 DIAGNOSIS — I15.2 HYPERTENSION ASSOCIATED WITH DIABETES: ICD-10-CM

## 2023-07-03 RX ORDER — CHLORTHALIDONE 25 MG/1
TABLET ORAL
Qty: 90 TABLET | Refills: 0 | Status: SHIPPED | OUTPATIENT
Start: 2023-07-03 | End: 2023-10-02

## 2023-07-03 NOTE — TELEPHONE ENCOUNTER
Refill Routing Note   Medication(s) are not appropriate for processing by [facility] for the following reason(s):      Required labs abnormal    [facility] action(s):  Defer Care Due:  None identified          Appointments  past 12m or future 3m with PCP    Date Provider   Last Visit   1/10/2023 Landon Smith MD   Next Visit   7/3/2023 Landon Smith MD   ED visits in past 90 days: 0        Note composed:12:47 PM 07/03/2023

## 2023-07-11 ENCOUNTER — HOSPITAL ENCOUNTER (OUTPATIENT)
Dept: RADIOLOGY | Facility: HOSPITAL | Age: 73
Discharge: HOME | End: 2023-07-11
Attending: NURSE PRACTITIONER
Payer: MEDICARE

## 2023-07-11 ENCOUNTER — OFFICE VISIT (OUTPATIENT)
Dept: FAMILY MEDICINE | Facility: CLINIC | Age: 73
End: 2023-07-11
Payer: MEDICARE

## 2023-07-11 VITALS
SYSTOLIC BLOOD PRESSURE: 132 MMHG | WEIGHT: 151.88 LBS | BODY MASS INDEX: 25.3 KG/M2 | DIASTOLIC BLOOD PRESSURE: 75 MMHG | RESPIRATION RATE: 18 BRPM | HEART RATE: 79 BPM | HEIGHT: 65 IN | OXYGEN SATURATION: 99 %

## 2023-07-11 DIAGNOSIS — E78.5 HYPERLIPIDEMIA ASSOCIATED WITH TYPE 2 DIABETES MELLITUS: Chronic | ICD-10-CM

## 2023-07-11 DIAGNOSIS — G89.29 CHRONIC LEFT SHOULDER PAIN: Chronic | ICD-10-CM

## 2023-07-11 DIAGNOSIS — M25.512 CHRONIC LEFT SHOULDER PAIN: Chronic | ICD-10-CM

## 2023-07-11 DIAGNOSIS — E11.65 TYPE 2 DIABETES MELLITUS WITH HYPERGLYCEMIA, WITHOUT LONG-TERM CURRENT USE OF INSULIN: Primary | Chronic | ICD-10-CM

## 2023-07-11 DIAGNOSIS — E11.69 HYPERLIPIDEMIA ASSOCIATED WITH TYPE 2 DIABETES MELLITUS: Chronic | ICD-10-CM

## 2023-07-11 PROBLEM — Z23 NEED FOR INFLUENZA VACCINATION: Status: RESOLVED | Noted: 2019-10-18 | Resolved: 2023-07-11

## 2023-07-11 LAB
ALBUMIN/CREAT UR: 3.3 UG/MG (ref 0–30)
CREAT UR-MCNC: 151 MG/DL (ref 23–375)
MICROALBUMIN UR DL<=1MG/L-MCNC: 5 UG/ML

## 2023-07-11 PROCEDURE — 82570 ASSAY OF URINE CREATININE: CPT | Performed by: NURSE PRACTITIONER

## 2023-07-11 PROCEDURE — 1160F RVW MEDS BY RX/DR IN RCRD: CPT | Mod: CPTII,S$GLB,, | Performed by: NURSE PRACTITIONER

## 2023-07-11 PROCEDURE — 99999 PR PBB SHADOW E&M-EST. PATIENT-LVL IV: ICD-10-PCS | Mod: PBBFAC,,, | Performed by: NURSE PRACTITIONER

## 2023-07-11 PROCEDURE — 73030 X-RAY EXAM OF SHOULDER: CPT | Mod: TC,PO,LT

## 2023-07-11 PROCEDURE — 1159F PR MEDICATION LIST DOCUMENTED IN MEDICAL RECORD: ICD-10-PCS | Mod: CPTII,S$GLB,, | Performed by: NURSE PRACTITIONER

## 2023-07-11 PROCEDURE — 3078F PR MOST RECENT DIASTOLIC BLOOD PRESSURE < 80 MM HG: ICD-10-PCS | Mod: CPTII,S$GLB,, | Performed by: NURSE PRACTITIONER

## 2023-07-11 PROCEDURE — 99214 PR OFFICE/OUTPT VISIT, EST, LEVL IV, 30-39 MIN: ICD-10-PCS | Mod: S$GLB,,, | Performed by: NURSE PRACTITIONER

## 2023-07-11 PROCEDURE — 3288F FALL RISK ASSESSMENT DOCD: CPT | Mod: CPTII,S$GLB,, | Performed by: NURSE PRACTITIONER

## 2023-07-11 PROCEDURE — 1159F MED LIST DOCD IN RCRD: CPT | Mod: CPTII,S$GLB,, | Performed by: NURSE PRACTITIONER

## 2023-07-11 PROCEDURE — 3288F PR FALLS RISK ASSESSMENT DOCUMENTED: ICD-10-PCS | Mod: CPTII,S$GLB,, | Performed by: NURSE PRACTITIONER

## 2023-07-11 PROCEDURE — 3075F SYST BP GE 130 - 139MM HG: CPT | Mod: CPTII,S$GLB,, | Performed by: NURSE PRACTITIONER

## 2023-07-11 PROCEDURE — 3078F DIAST BP <80 MM HG: CPT | Mod: CPTII,S$GLB,, | Performed by: NURSE PRACTITIONER

## 2023-07-11 PROCEDURE — 99214 OFFICE O/P EST MOD 30 MIN: CPT | Mod: S$GLB,,, | Performed by: NURSE PRACTITIONER

## 2023-07-11 PROCEDURE — 1101F PT FALLS ASSESS-DOCD LE1/YR: CPT | Mod: CPTII,S$GLB,, | Performed by: NURSE PRACTITIONER

## 2023-07-11 PROCEDURE — 3008F PR BODY MASS INDEX (BMI) DOCUMENTED: ICD-10-PCS | Mod: CPTII,S$GLB,, | Performed by: NURSE PRACTITIONER

## 2023-07-11 PROCEDURE — 1126F PR PAIN SEVERITY QUANTIFIED, NO PAIN PRESENT: ICD-10-PCS | Mod: CPTII,S$GLB,, | Performed by: NURSE PRACTITIONER

## 2023-07-11 PROCEDURE — 73030 XR SHOULDER COMPLETE 2 OR MORE VIEWS LEFT: ICD-10-PCS | Mod: 26,LT,, | Performed by: RADIOLOGY

## 2023-07-11 PROCEDURE — 99999 PR PBB SHADOW E&M-EST. PATIENT-LVL IV: CPT | Mod: PBBFAC,,, | Performed by: NURSE PRACTITIONER

## 2023-07-11 PROCEDURE — 1101F PR PT FALLS ASSESS DOC 0-1 FALLS W/OUT INJ PAST YR: ICD-10-PCS | Mod: CPTII,S$GLB,, | Performed by: NURSE PRACTITIONER

## 2023-07-11 PROCEDURE — 73030 X-RAY EXAM OF SHOULDER: CPT | Mod: 26,LT,, | Performed by: RADIOLOGY

## 2023-07-11 PROCEDURE — 3008F BODY MASS INDEX DOCD: CPT | Mod: CPTII,S$GLB,, | Performed by: NURSE PRACTITIONER

## 2023-07-11 PROCEDURE — 1160F PR REVIEW ALL MEDS BY PRESCRIBER/CLIN PHARMACIST DOCUMENTED: ICD-10-PCS | Mod: CPTII,S$GLB,, | Performed by: NURSE PRACTITIONER

## 2023-07-11 PROCEDURE — 1126F AMNT PAIN NOTED NONE PRSNT: CPT | Mod: CPTII,S$GLB,, | Performed by: NURSE PRACTITIONER

## 2023-07-11 PROCEDURE — 3075F PR MOST RECENT SYSTOLIC BLOOD PRESS GE 130-139MM HG: ICD-10-PCS | Mod: CPTII,S$GLB,, | Performed by: NURSE PRACTITIONER

## 2023-07-11 RX ORDER — LANCETS 33 GAUGE
EACH MISCELLANEOUS
COMMUNITY
Start: 2023-03-21 | End: 2023-07-11 | Stop reason: SDUPTHER

## 2023-07-11 RX ORDER — LANCETS 30 GAUGE
EACH MISCELLANEOUS
COMMUNITY
Start: 2023-03-21 | End: 2023-07-11 | Stop reason: SDUPTHER

## 2023-07-11 NOTE — PROGRESS NOTES
Primary Care Telemedicine Note  The patient location is:  Patient's Home - [place]  The chief complaint leading to consultation is:   Chief Complaint   Patient presents with    Follow-up      Total time:  see MDM below. The total time spent on the encounter, which includes face to face time and non-face to face time preparing to see the patient (eg, review of previous medical records, tests), Obtaining and/or reviewing separately obtained history, documenting clinical information in the electronic or other health record, independently interpreting results (not separately reported)/communicating results to the patient/family/caregiver, and/or care coordination (not separately reported).    Visit type: Virtual visit with synchronous audio only and video  Each patient to whom he or she provides medical services by telemedicine is:  (1) informed of the relationship between the physician and patient and the respective role of any other health care provider with respect to management of the patient; and (2) notified that he or she may decline to receive medical services by telemedicine and may withdraw from such care at any time.  =================================================================    Assessment/Plan:  Problem List Items Addressed This Visit          Cardiac/Vascular    Hyperlipidemia associated with type 2 diabetes mellitus (Chronic)    Overview     July 2023: Check lipids today. Intermittent compliance.   January 2023:  Patient reports noncompliance with diet and medications.  December 2021:  LDL has improved from previous year but still above goal.  Increase compliance.  December 2020:  Compliant with 40 mg of atorvastatin daily.  Will recheck lipids today.JUNE 2020:  Updating fasting lipid panel today.  Patient doing well on atorvastatin 40 mg.  LDL at goal.December 2019:  Patient due for updated lipid panel.  Patient is compliant with atorvastatin.  No side effects reported.  CHRONIC. STABLE. Lab  analysis reviewed.   (-) CP, SOB, abdominal pain, N/V/D, constipation, jaundice, skin changes.  (-) Myalgias  Lab Results   Component Value Date    CHOL 182 12/30/2022    CHOL 174 12/13/2021    CHOL 202 (H) 12/28/2020     Lab Results   Component Value Date    HDL 42 12/30/2022    HDL 37 (L) 12/13/2021    HDL 42 12/28/2020     Lab Results   Component Value Date    LDLCALC 123.6 12/30/2022    LDLCALC 115.6 12/13/2021    LDLCALC 139.6 12/28/2020     Lab Results   Component Value Date    TRIG 82 12/30/2022    TRIG 107 12/13/2021    TRIG 102 12/28/2020     Lab Results   Component Value Date    CHOLHDL 23.1 12/30/2022    CHOLHDL 21.3 12/13/2021    CHOLHDL 20.8 12/28/2020     Lab Results   Component Value Date    TOTALCHOLEST 4.3 12/30/2022    TOTALCHOLEST 4.7 12/13/2021    TOTALCHOLEST 4.8 12/28/2020     Lab Results   Component Value Date    ALT 16 12/30/2022    AST 17 12/30/2022    ALKPHOS 100 12/30/2022    BILITOT 0.7 12/30/2022   ======================================================           Current Assessment & Plan     Target LDL lower. Continue 80 milligrams atorvastatin.  Increase compliance daily.  If no improvement add Zetia. Counseled on hyperlipidemia disease course, healthy diet and increased need for exercise. Please be advised of the risk of cardiovascular disease, increase stroke and heart attack risk with uncontrolled/untreated hyperlipidemia. Patient voiced understanding and understood the treatment plan. All questions were answered.          Relevant Orders    Lipid Panel       Endocrine    Type 2 diabetes mellitus with hyperglycemia, without long-term current use of insulin - Primary (Chronic)    Overview     July 2023: intermittent compliance with medications and diet. Reports has accidentally skipped a few weeks of Ozempic. No SE reported. Reports blood sugars 90-120s.     January 2023: Patient reports noncompliance with diet.  He also reports intermittent compliance with his metformin.    Diabetes  Management StatusStatin: TakingACE/ARB: Not taking    June 2022:Diabetes Management StatusStatin: TakingACE/ARB: Not taking    Diabetes Management Status    Statin: Taking  ACE/ARB: Not taking    Screening or Prevention Patient's value Goal Complete/Controlled?   HgA1C Testing and Control   Lab Results   Component Value Date    HGBA1C 7.9 (H) 12/30/2022      Annually/Less than 8% Yes   Lipid profile : 12/30/2022 Annually Yes   LDL control Lab Results   Component Value Date    LDLCALC 123.6 12/30/2022    Annually/Less than 100 mg/dl  No   Nephropathy screening Lab Results   Component Value Date    LABMICR 10.0 06/28/2022     No results found for: PROTEINUA  No results found for: UTPCR   Annually Yes   Blood pressure BP Readings from Last 1 Encounters:   07/11/23 132/75    Less than 140/90 Yes   Dilated retinal exam : 01/09/2020 Annually No   Foot exam   : 01/10/2023 Annually Yes            Current Assessment & Plan     Recheck A1C. Increase compliance with diet and exercise and medications.  We will plan to monitor hemoglobin A1c at designated intervals 3 to 6 months. I recommend ongoing Education for diabetic diet and exercise protocol.  We will continue to monitor for side effects. Please be advised of symptoms to monitor for and to notify me immediately if persistent or worsening. Eye exam due. Sees [facility] in [place]. Follow up with Ophthalmology/Optometry and Podiatry at least annually.          Relevant Orders    Hemoglobin A1C    Comprehensive Metabolic Panel    Microalbumin/creatinine urine ratio       Orthopedic    Chronic left shoulder pain (Chronic)    Overview     July 2023: Chronic. Worsening. Reports pain and limitation in ROM. No recent injuries or known trauma.     June 2021: Chronic.  Worse since his stroke.  Patient reports that he has relief with gabapentin as prescribed.  No recent injury or trauma.         Current Assessment & Plan     Caution NSAIDs due to history of CKD. Would like  to avoid steroids due to uncontrolled diabetes. Obtain imaging today. Consider physical therapy.          Relevant Orders    X-Ray Shoulder 2 or More Views Left     Follow up if symptoms worsen or fail to improve.  ER precautions for severe or worsening symptoms.     Britany Reis, OLGA  _____________________________________________________________________________________________________________________________________________________    CC: follow up    HPI: Patient is a 73-year-old male who presents in clinic today as an established patient here for follow up. Chronic condition has been reviewed. Further details as stated above.     Past Medical History:  Past Medical History:   Diagnosis Date    Hyperlipidemia     Hypertension     Stroke      History reviewed. No pertinent surgical history.  Review of patient's allergies indicates:   Allergen Reactions    Lisinopril      Social History     Tobacco Use    Smoking status: Never    Smokeless tobacco: Never   Substance Use Topics    Alcohol use: Never    Drug use: Never     Family History   Problem Relation Age of Onset    Cancer Mother         throat    Heart disease Father     Heart disease Brother     Cancer Brother      Current Outpatient Medications on File Prior to Visit   Medication Sig Dispense Refill    amLODIPine (NORVASC) 10 MG tablet TAKE 1 TABLET DAILY 90 tablet 1    aspirin (ECOTRIN) 81 MG EC tablet Chew 1 tablet (81 mg total) by mouth daily 90 tablet 4    atorvastatin (LIPITOR) 80 MG tablet Take 1 tablet (80 mg total) by mouth every evening. TAKE 1 TABLET DAILY 90 tablet 4    blood sugar diagnostic (BLOOD GLUCOSE TEST) Strp Use 1-2 x a day to check glucoses before meals. 100 strip 11    blood-glucose meter kit Use as instructed 1 each 0    chlorthalidone (HYGROTEN) 25 MG Tab TAKE 1 TABLET DAILY 90 tablet 0    famotidine (PEPCID) 20 MG tablet TAKE 1 TABLET TWICE A  tablet 3    gabapentin (NEURONTIN) 400 MG capsule Take 1 capsule (400 mg total) by  "mouth 3 (three) times daily. 270 capsule 4    lancets Misc 1 Units by Misc.(Non-Drug; Combo Route) route daily as needed. 100 each 11    metFORMIN (GLUCOPHAGE-XR) 500 MG ER 24hr tablet TAKE 1 TABLET DAILY WITH BREAKFAST 90 tablet 4    OZEMPIC 0.25 mg or 0.5 mg (2 mg/3 mL) pen injector inject 0.25mg into the skin once every seven (7) days then increase to 0.5mg EVERY 7 DAYS 3 mL 1    potassium chloride (MICRO-K) 10 MEQ CpSR Take 1 capsule (10 mEq total) by mouth once daily. 90 capsule 3    [DISCONTINUED] ONETOUCH DELICA PLUS LANCET 33 gauge Misc SMARTSI Topical Daily      [DISCONTINUED] ONETOUCH VERIO FLEX METER Misc        No current facility-administered medications on file prior to visit.     Review of Systems   Constitutional:  Negative for appetite change, chills, fatigue and fever.   HENT:  Negative for congestion, rhinorrhea and sore throat.    Eyes:  Negative for visual disturbance.   Respiratory:  Negative for cough and shortness of breath.    Cardiovascular:  Negative for chest pain, palpitations and leg swelling.   Gastrointestinal:  Negative for abdominal pain, diarrhea and vomiting.   Genitourinary:  Negative for difficulty urinating, dysuria and hematuria.   Musculoskeletal:  Positive for arthralgias. Negative for myalgias.   Skin:  Negative for rash and wound.   Neurological:  Negative for dizziness and headaches.   Psychiatric/Behavioral:  Negative for behavioral problems. The patient is not nervous/anxious.      Vitals:    23 0918   BP: 132/75   BP Location: Right arm   Pulse: 79   Resp: 18   SpO2: 99%   Weight: 68.9 kg (151 lb 14.4 oz)   Height: 5' 5" (1.651 m)     Wt Readings from Last 3 Encounters:   23 68.9 kg (151 lb 14.4 oz)   01/10/23 74.1 kg (163 lb 5.8 oz)   22 71.6 kg (157 lb 14.4 oz)     Physical Exam  Vitals reviewed.   Constitutional:       General: He is not in acute distress.     Appearance: Normal appearance. He is not ill-appearing.   HENT:      Head: " Normocephalic and atraumatic.      Right Ear: External ear normal.      Left Ear: External ear normal.      Nose: Nose normal.   Eyes:      Extraocular Movements: Extraocular movements intact.      Conjunctiva/sclera: Conjunctivae normal.   Cardiovascular:      Rate and Rhythm: Normal rate.      Heart sounds: Murmur heard.   Pulmonary:      Effort: Pulmonary effort is normal. No respiratory distress.      Breath sounds: Normal breath sounds.   Abdominal:      General: Abdomen is flat. There is no distension.   Musculoskeletal:      Left shoulder: Tenderness present. No swelling. Decreased range of motion.      Cervical back: Normal range of motion.   Skin:     General: Skin is warm and dry.      Capillary Refill: Capillary refill takes less than 2 seconds.      Coloration: Skin is not pale.      Findings: No rash.   Neurological:      General: No focal deficit present.      Mental Status: He is alert and oriented to person, place, and time. Mental status is at baseline.   Psychiatric:         Mood and Affect: Mood normal.         Speech: Speech normal. Speech is not rapid and pressured, delayed or slurred.         Behavior: Behavior normal. Behavior is not agitated, slowed, aggressive, withdrawn, hyperactive or combative. Behavior is cooperative.         Thought Content: Thought content normal.         Judgment: Judgment normal.     Health Maintenance   Topic Date Due    Eye Exam  01/09/2021    Hemoglobin A1c  06/30/2023    TETANUS VACCINE  01/10/2024 (Originally 4/4/1968)    PROSTATE-SPECIFIC ANTIGEN  12/30/2023    Lipid Panel  12/30/2023    Foot Exam  01/10/2024    High Dose Statin  07/11/2024    Hepatitis C Screening  Completed

## 2023-07-11 NOTE — ASSESSMENT & PLAN NOTE
Caution NSAIDs due to history of CKD. Would like to avoid steroids due to uncontrolled diabetes. Obtain imaging today. Consider physical therapy.   
Recheck A1C. Increase compliance with diet and exercise and medications.  We will plan to monitor hemoglobin A1c at designated intervals 3 to 6 months. I recommend ongoing Education for diabetic diet and exercise protocol.  We will continue to monitor for side effects. Please be advised of symptoms to monitor for and to notify me immediately if persistent or worsening. Eye exam due. Sees [facility] in [place]. Follow up with Ophthalmology/Optometry and Podiatry at least annually.   
Target LDL lower. Continue 80 milligrams atorvastatin.  Increase compliance daily.  If no improvement add Zetia. Counseled on hyperlipidemia disease course, healthy diet and increased need for exercise. Please be advised of the risk of cardiovascular disease, increase stroke and heart attack risk with uncontrolled/untreated hyperlipidemia. Patient voiced understanding and understood the treatment plan. All questions were answered.   
24-Aug-2022

## 2023-10-02 ENCOUNTER — TELEPHONE (OUTPATIENT)
Dept: ADMINISTRATIVE | Facility: CLINIC | Age: 73
End: 2023-10-02
Payer: MEDICARE

## 2023-10-02 DIAGNOSIS — E11.59 HYPERTENSION ASSOCIATED WITH DIABETES: ICD-10-CM

## 2023-10-02 DIAGNOSIS — I15.2 HYPERTENSION ASSOCIATED WITH DIABETES: ICD-10-CM

## 2023-10-02 DIAGNOSIS — Z79.899 ENCOUNTER FOR LONG-TERM (CURRENT) USE OF MEDICATIONS: ICD-10-CM

## 2023-10-02 RX ORDER — CHLORTHALIDONE 25 MG/1
TABLET ORAL
Qty: 90 TABLET | Refills: 1 | Status: SHIPPED | OUTPATIENT
Start: 2023-10-02 | End: 2024-03-28

## 2023-10-02 NOTE — TELEPHONE ENCOUNTER
Refill Routing Note   Medication(s) are not appropriate for processing by [facility] for the following reason(s):      Required labs abnormal    [facility] action(s):  Defer Care Due:  None identified            Appointments  past 12m or future 3m with PCP    Date Provider   Last Visit   1/10/2023 Landon Smith MD   Next Visit   Visit date not found Lnadon Smith MD   ED visits in past 90 days: 0        Note composed:7:21 AM 10/02/2023

## 2023-10-02 NOTE — TELEPHONE ENCOUNTER
Called pt; no answer; could not leave a message due to voice mail not set up; I was calling to confirm pt's virtual EAWV appointment ofr 10/4/23 at 10:00am and see if pt needed any help with the setting up [facility] portal account for virtual appt or e-pre check; could not sent message through portal due to portal account pending

## 2023-12-29 DIAGNOSIS — E11.59 HYPERTENSION ASSOCIATED WITH DIABETES: ICD-10-CM

## 2023-12-29 DIAGNOSIS — I15.2 HYPERTENSION ASSOCIATED WITH DIABETES: ICD-10-CM

## 2023-12-29 DIAGNOSIS — Z79.899 ENCOUNTER FOR LONG-TERM (CURRENT) USE OF MEDICATIONS: ICD-10-CM

## 2023-12-29 RX ORDER — AMLODIPINE BESYLATE 10 MG/1
TABLET ORAL
Qty: 90 TABLET | Refills: 0 | Status: SHIPPED | OUTPATIENT
Start: 2023-12-29 | End: 2024-03-28

## 2023-12-29 NOTE — TELEPHONE ENCOUNTER
Care Due:                  Date            Visit Type   Department     Provider  --------------------------------------------------------------------------------                                EP -                              PRIMARY      [facility] FAMILY  Last Visit: 01-      CARE ([facility])   MEDICINE       Landon Smith  Next Visit: None Scheduled  None         None Found                                                            Last  Test          Frequency    Reason                     Performed    Due Date  --------------------------------------------------------------------------------    HBA1C.......  6 months...  metFORMIN................  07- 01-    [facility] Embedded Care Due Messages. Reference number: [id number].   12/29/2023 12:11:37 AM CST

## 2023-12-29 NOTE — TELEPHONE ENCOUNTER
Provider Staff:  Action required for this patient    Requires labs      Please see care gap opportunities below in Care Due Message.    Thanks!  [facility]     Appointments      Date Provider   Last Visit   1/10/2023 Landon Smith MD   Next Visit   Visit date not found Landon Smith MD     Refill Decision Note   Jose R Coffman  is requesting a refill authorization.  Brief Assessment and Rationale for Refill:  Approve     Medication Therapy Plan:         Comments:     Note composed:11:42 AM 12/29/2023

## 2024-01-29 DIAGNOSIS — G63 NEUROPATHY DUE TO MEDICAL CONDITION: ICD-10-CM

## 2024-01-29 RX ORDER — GABAPENTIN 400 MG/1
400 CAPSULE ORAL 3 TIMES DAILY
Qty: 270 CAPSULE | Refills: 3 | Status: SHIPPED | OUTPATIENT
Start: 2024-01-29 | End: 2024-05-20 | Stop reason: SDUPTHER

## 2024-01-29 NOTE — TELEPHONE ENCOUNTER
No care due was identified.  [facility] Embedded Care Due Messages. Reference number: [id number].   1/29/2024 9:36:14 AM CST

## 2024-01-29 NOTE — TELEPHONE ENCOUNTER
Refill Routing Note   Medication(s) are not appropriate for processing by [facility] for the following reason(s):        Outside of protocol    [facility] action(s):  Route               Appointments  past 12m or future 3m with PCP    Date Provider   Last Visit   1/10/2023 Landon Smith MD   Next Visit   Visit date not found Landon Smith MD   ED visits in past 90 days: 0        Note composed:10:49 AM 01/29/2024

## 2024-02-08 DIAGNOSIS — Z79.899 ENCOUNTER FOR LONG-TERM (CURRENT) USE OF MEDICATIONS: ICD-10-CM

## 2024-02-09 RX ORDER — FAMOTIDINE 20 MG/1
TABLET, FILM COATED ORAL
Qty: 180 TABLET | Refills: 0 | Status: SHIPPED | OUTPATIENT
Start: 2024-02-09 | End: 2024-05-20 | Stop reason: SDUPTHER

## 2024-02-09 NOTE — TELEPHONE ENCOUNTER
Refill Routing Note   Medication(s) are not appropriate for processing by [facility] for the following reason(s):        Required labs abnormal    [facility] action(s):  Defer             Pharmacist review requested: Yes     Appointments  past 12m or future 3m with PCP    Date Provider   Last Visit   1/10/2023 Landon Smith MD   Next Visit   Visit date not found Landon Smith MD   ED visits in past 90 days: 0        Note composed:7:25 AM 02/09/2024

## 2024-02-09 NOTE — TELEPHONE ENCOUNTER
Refill Decision Note   Jose R Meghna  is requesting a refill authorization.  Brief Assessment and Rationale for Refill:  Approve     Medication Therapy Plan:  eGFR (>50) reviewed. Current dosage within recommendatons. Renal dose adjustment not required at this time.      Pharmacist review requested: Yes   Comments:     Note composed:8:39 AM 02/09/2024

## 2024-02-09 NOTE — TELEPHONE ENCOUNTER
No care due was identified.  [facility] Embedded Care Due Messages. Reference number: [id number].   2/08/2024 11:42:38 PM CST

## 2024-02-21 ENCOUNTER — TELEPHONE (OUTPATIENT)
Dept: FAMILY MEDICINE | Facility: CLINIC | Age: 74
End: 2024-02-21
Payer: COMMERCIAL

## 2024-02-21 DIAGNOSIS — E11.65 TYPE 2 DIABETES MELLITUS WITH HYPERGLYCEMIA, WITHOUT LONG-TERM CURRENT USE OF INSULIN: Primary | ICD-10-CM

## 2024-02-21 RX ORDER — SEMAGLUTIDE 0.68 MG/ML
INJECTION, SOLUTION SUBCUTANEOUS
Qty: 9 ML | Refills: 0 | Status: SHIPPED | OUTPATIENT
Start: 2024-02-21 | End: 2024-03-25 | Stop reason: SDUPTHER

## 2024-02-21 NOTE — TELEPHONE ENCOUNTER
No care due was identified.  Health [facility] Embedded Care Due Messages. Reference number: [id number].   2/21/2024 9:03:33 AM CST

## 2024-02-21 NOTE — TELEPHONE ENCOUNTER
----- Message from Michael Cosby sent at 2/21/2024  8:46 AM CST -----  Please refill the medication(s) listed below.Please call the patient when the prescription(s) is ready for  at this phone number        Medication #1 OZEMPIC 0.25 mg or 0.5 mg (2 mg/3 mL) pen injector  Medication #2  Medication #3      Preferred Pharmacy:  .  [facility] - [address] [address]  Phone: [phone] Fax: [phone]    Would the patient rather a call back or a response via [facility]? CALL    Best Call Back Number:[phone]    Additional [facility] Thank you

## 2024-02-21 NOTE — TELEPHONE ENCOUNTER
----- Message from Gege Dave sent at 2/21/2024  8:22 AM CST -----  Contact: [facility] Gal[facility] is calling in regards to a fax sent over for the patients medications. The fax was sent on Feb 14 and Feb 20. Please call back at [phone]

## 2024-02-21 NOTE — TELEPHONE ENCOUNTER
Refill Routing Note   Medication(s) are not appropriate for processing by [facility] for the following reason(s):        Clarification of medication (Rx) details  Required labs outdated    [facility] action(s):  Defer        Medication Therapy Plan: Will pend current dose to reflect after the initial starter dose      Appointments  past 12m or future 3m with PCP    Date Provider   Last Visit   1/10/2023 Landon Smith MD   Next Visit   Visit date not found Landon Smith MD   ED visits in past 90 days: 0        Note composed:9:58 AM 02/21/2024

## 2024-02-22 ENCOUNTER — TELEPHONE (OUTPATIENT)
Dept: FAMILY MEDICINE | Facility: CLINIC | Age: 74
End: 2024-02-22
Payer: COMMERCIAL

## 2024-02-22 DIAGNOSIS — E11.65 TYPE 2 DIABETES MELLITUS WITH HYPERGLYCEMIA, WITHOUT LONG-TERM CURRENT USE OF INSULIN: ICD-10-CM

## 2024-02-22 DIAGNOSIS — G63 NEUROPATHY DUE TO MEDICAL CONDITION: ICD-10-CM

## 2024-02-22 NOTE — TELEPHONE ENCOUNTER
----- Message from Christina Calvert sent at 2/22/2024 12:25 PM CST -----  Melizana with [facility] a fax request that was sent 02/21/2024 for patient's medication to be filled and sent to [facility]. Call back number is [phone]. Thx. EL

## 2024-02-22 NOTE — TELEPHONE ENCOUNTER
[facility]ed [facility] to [facility] would like his future refills sent to, patient stated [facility]. Pharmacy changed in chart.

## 2024-02-23 ENCOUNTER — TELEPHONE (OUTPATIENT)
Dept: FAMILY MEDICINE | Facility: CLINIC | Age: 74
End: 2024-02-23
Payer: COMMERCIAL

## 2024-03-04 ENCOUNTER — TELEPHONE (OUTPATIENT)
Dept: FAMILY MEDICINE | Facility: CLINIC | Age: 74
End: 2024-03-04
Payer: COMMERCIAL

## 2024-03-04 NOTE — TELEPHONE ENCOUNTER
Called pt to let him know I have faxed the paper work back to [facility]. Patient stated they did not receive this. Will refax, pt verbalized understanding

## 2024-03-04 NOTE — TELEPHONE ENCOUNTER
----- Message from Shamika Larkin sent at 3/4/2024 11:04 AM CST -----  Contact: self  Pt is asking for an return call in reference to paper work [facility] is is sending over in reference to getting his prescriptions please call back at [phone] Thx CJ

## 2024-03-25 DIAGNOSIS — E11.65 TYPE 2 DIABETES MELLITUS WITH HYPERGLYCEMIA, WITHOUT LONG-TERM CURRENT USE OF INSULIN: ICD-10-CM

## 2024-03-25 RX ORDER — SEMAGLUTIDE 0.68 MG/ML
INJECTION, SOLUTION SUBCUTANEOUS
Qty: 9 ML | Refills: 0 | Status: SHIPPED | OUTPATIENT
Start: 2024-03-25 | End: 2024-05-20 | Stop reason: SDUPTHER

## 2024-03-25 NOTE — TELEPHONE ENCOUNTER
Called pt to make sure that he was still using [facility], which he is and the paperwork for med to go through them is already faxed. However, he needs a refill on ozempic to [facility] due to it being cheaper to get medications through them

## 2024-03-25 NOTE — TELEPHONE ENCOUNTER
Care Due:                  Date            Visit Type   Department     Provider  --------------------------------------------------------------------------------                                EP -                              PRIMARY      [facility] FAMILY  Last Visit: 01-      CARE ([facility])   MEDICINE       Landon Smith  Next Visit: None Scheduled  None         None Found                                                            Last  Test          Frequency    Reason                     Performed    Due Date  --------------------------------------------------------------------------------    Office Visit  15 months..  atorvastatin, metFORMIN,   01-   04-                             potassium, semaglutide...    HBA1C.......  6 months...  metFORMIN, semaglutide...  07- 01-    Health [facility] Embedded Care Due Messages. Reference number: [id number].   3/25/2024 12:55:24 PM CDT

## 2024-03-25 NOTE — TELEPHONE ENCOUNTER
----- Message from Anabel Hermann sent at 3/25/2024 11:48 AM CDT -----  Regarding: rx concerns  Name of who is calling:   [facility]      What is the request in detail: Requesting a call back in ref to medications being requested through [employer]  [facility] mail service      Can the clinic reply by [facility]:no      What number to call back if not [facility]: [phone]

## 2024-03-27 DIAGNOSIS — Z79.899 ENCOUNTER FOR LONG-TERM (CURRENT) USE OF MEDICATIONS: ICD-10-CM

## 2024-03-27 DIAGNOSIS — E11.59 HYPERTENSION ASSOCIATED WITH DIABETES: ICD-10-CM

## 2024-03-27 DIAGNOSIS — I15.2 HYPERTENSION ASSOCIATED WITH DIABETES: ICD-10-CM

## 2024-03-28 RX ORDER — AMLODIPINE BESYLATE 10 MG/1
TABLET ORAL
Qty: 90 TABLET | Refills: 0 | Status: SHIPPED | OUTPATIENT
Start: 2024-03-28 | End: 2024-04-04 | Stop reason: SDUPTHER

## 2024-03-28 RX ORDER — CHLORTHALIDONE 25 MG/1
TABLET ORAL
Qty: 90 TABLET | Refills: 3 | Status: SHIPPED | OUTPATIENT
Start: 2024-03-28 | End: 2024-04-04 | Stop reason: SDUPTHER

## 2024-03-28 NOTE — TELEPHONE ENCOUNTER
No care due was identified.  Health [facility] Embedded Care Due Messages. Reference number: [id number].   3/28/2024 10:41:46 AM CDT

## 2024-03-28 NOTE — TELEPHONE ENCOUNTER
No care due was identified.  Health [facility] Embedded Care Due Messages. Reference number: [id number].   3/27/2024 11:08:19 PM CDT

## 2024-03-28 NOTE — TELEPHONE ENCOUNTER
Refill Routing Note   Medication(s) are not appropriate for processing by [facility] for the following reason(s):        Required labs abnormal  No active prescription written by provider    [facility] action(s):  Defer        Medication Therapy Plan: The provider responsible for managing the medication isnt PCP      Appointments  past 12m or future 3m with PCP    Date Provider   Last Visit   1/10/2023 Landon Smith MD   Next Visit   Visit date not found Landon Smith MD   ED visits in past 90 days: 0        Note composed:10:43 AM 03/28/2024

## 2024-03-28 NOTE — TELEPHONE ENCOUNTER
Refill Decision Note   Jose R Coffman  is requesting a refill authorization.  Brief Assessment and Rationale for Refill:  Approve     Medication Therapy Plan:         Comments:     Note composed:6:36 AM 03/28/2024

## 2024-04-04 DIAGNOSIS — E11.59 HYPERTENSION ASSOCIATED WITH DIABETES: ICD-10-CM

## 2024-04-04 DIAGNOSIS — Z79.899 ENCOUNTER FOR LONG-TERM (CURRENT) USE OF MEDICATIONS: ICD-10-CM

## 2024-04-04 DIAGNOSIS — I15.2 HYPERTENSION ASSOCIATED WITH DIABETES: ICD-10-CM

## 2024-04-04 RX ORDER — AMLODIPINE BESYLATE 10 MG/1
10 TABLET ORAL DAILY
Qty: 90 TABLET | Refills: 0 | Status: SHIPPED | OUTPATIENT
Start: 2024-04-04 | End: 2024-06-19

## 2024-04-04 RX ORDER — CHLORTHALIDONE 25 MG/1
25 TABLET ORAL DAILY
Qty: 90 TABLET | Refills: 3 | Status: SHIPPED | OUTPATIENT
Start: 2024-04-04

## 2024-04-04 NOTE — TELEPHONE ENCOUNTER
No care due was identified.  Health [facility] Embedded Care Due Messages. Reference number: [id number].   4/04/2024 4:23:42 PM CDT

## 2024-04-07 DIAGNOSIS — E11.69 HYPERLIPIDEMIA ASSOCIATED WITH TYPE 2 DIABETES MELLITUS: ICD-10-CM

## 2024-04-07 DIAGNOSIS — E78.5 HYPERLIPIDEMIA ASSOCIATED WITH TYPE 2 DIABETES MELLITUS: ICD-10-CM

## 2024-04-07 DIAGNOSIS — Z79.899 ENCOUNTER FOR LONG-TERM (CURRENT) USE OF MEDICATIONS: ICD-10-CM

## 2024-04-07 DIAGNOSIS — E87.6 HYPOKALEMIA: ICD-10-CM

## 2024-04-08 RX ORDER — ATORVASTATIN CALCIUM 80 MG/1
80 TABLET, FILM COATED ORAL NIGHTLY
Qty: 90 TABLET | Refills: 0 | Status: SHIPPED | OUTPATIENT
Start: 2024-04-08 | End: 2024-05-20 | Stop reason: SDUPTHER

## 2024-04-08 RX ORDER — POTASSIUM CHLORIDE 750 MG/1
10 CAPSULE, EXTENDED RELEASE ORAL DAILY
Qty: 90 CAPSULE | Refills: 0 | Status: SHIPPED | OUTPATIENT
Start: 2024-04-08 | End: 2024-05-20 | Stop reason: SDUPTHER

## 2024-04-08 NOTE — TELEPHONE ENCOUNTER
----- Message from Nazario Montgomery MD sent at 5/24/2021  6:31 PM CDT -----  Repeat ultrasound left breast   Care Due:                  Date            Visit Type   Department     Provider  --------------------------------------------------------------------------------                                EP -                              PRIMARY      [facility] FAMILY  Last Visit: 01-      CARE ([facility])   MEDICINE       Landon Smith  Next Visit: None Scheduled  None         None Found                                                            Last  Test          Frequency    Reason                     Performed    Due Date  --------------------------------------------------------------------------------    CMP.........  12 months..  atorvastatin,              07- 07-                             chlorthalidone,                             metFORMIN, potassium.....    Lipid Panel.  12 months..  atorvastatin.............  07- 07-    [facility] Embedded Care Due Messages. Reference number: [id number].   4/07/2024 11:24:49 PM CDT

## 2024-04-08 NOTE — TELEPHONE ENCOUNTER
Refill Routing Note   Medication(s) are not appropriate for processing by [facility] for the following reason(s):        Patient not seen by provider within 15 months    [facility] action(s):  Defer     Requires labs : Yes             Appointments  past 12m or future 3m with PCP    Date Provider   Last Visit   1/10/2023 Landon Smith MD   Next Visit   Visit date not found Landon Smith MD   ED visits in past 90 days: 0        Note composed:1:28 PM 04/08/2024

## 2024-05-20 DIAGNOSIS — E11.65 TYPE 2 DIABETES MELLITUS WITH HYPERGLYCEMIA, WITHOUT LONG-TERM CURRENT USE OF INSULIN: ICD-10-CM

## 2024-05-20 DIAGNOSIS — Z79.899 ENCOUNTER FOR LONG-TERM (CURRENT) USE OF MEDICATIONS: ICD-10-CM

## 2024-05-20 DIAGNOSIS — E87.6 HYPOKALEMIA: ICD-10-CM

## 2024-05-20 DIAGNOSIS — G63 NEUROPATHY DUE TO MEDICAL CONDITION: ICD-10-CM

## 2024-05-20 DIAGNOSIS — E78.5 HYPERLIPIDEMIA ASSOCIATED WITH TYPE 2 DIABETES MELLITUS: ICD-10-CM

## 2024-05-20 DIAGNOSIS — E11.69 HYPERLIPIDEMIA ASSOCIATED WITH TYPE 2 DIABETES MELLITUS: ICD-10-CM

## 2024-05-20 NOTE — TELEPHONE ENCOUNTER
----- Message from Paul Rasmussen sent at 5/20/2024 11:24 AM CDT -----  Contact: Merlin/ [facility]  Merlin is needing a call back regarding not receiving Jose R medication that was requested gabapentin (NEURONTIN) 400 MG capsule, famotidine (PEPCID) 20 MG tablet, potassium chloride (MICRO-K) 10 MEQ CpSR, atorvastatin (LIPITOR) 80 MG tablet, aspirin (ECOTRIN) 81 MG EC tablet semaglutide (OZEMPIC) 0.25 mg or 0.5 mg (2 mg/3 mL) pen injector. Please call back at [phone].      [facility] - [place], [place] - [address] [place] [address]  [address] [place] [address]  Phone: [phone] Fax: [phone]      Thank you paul

## 2024-05-20 NOTE — TELEPHONE ENCOUNTER
No care due was identified.  [facility] Embedded Care Due Messages. Reference number: [id number].   5/20/2024 1:18:11 PM CDT

## 2024-05-21 RX ORDER — FAMOTIDINE 20 MG/1
TABLET, FILM COATED ORAL
Qty: 180 TABLET | Refills: 0 | Status: SHIPPED | OUTPATIENT
Start: 2024-05-21

## 2024-05-21 RX ORDER — POTASSIUM CHLORIDE 750 MG/1
10 CAPSULE, EXTENDED RELEASE ORAL DAILY
Qty: 90 CAPSULE | Refills: 0 | Status: SHIPPED | OUTPATIENT
Start: 2024-05-21

## 2024-05-21 RX ORDER — GABAPENTIN 400 MG/1
400 CAPSULE ORAL 3 TIMES DAILY
Qty: 270 CAPSULE | Refills: 3 | Status: SHIPPED | OUTPATIENT
Start: 2024-05-21

## 2024-05-21 RX ORDER — SEMAGLUTIDE 0.68 MG/ML
INJECTION, SOLUTION SUBCUTANEOUS
Qty: 9 ML | Refills: 0 | Status: SHIPPED | OUTPATIENT
Start: 2024-05-21

## 2024-05-21 RX ORDER — ASPIRIN 81 MG/1
TABLET ORAL
Qty: 90 TABLET | Refills: 4 | Status: SHIPPED | OUTPATIENT
Start: 2024-05-21 | End: 2024-05-28

## 2024-05-21 RX ORDER — ATORVASTATIN CALCIUM 80 MG/1
80 TABLET, FILM COATED ORAL NIGHTLY
Qty: 90 TABLET | Refills: 0 | Status: SHIPPED | OUTPATIENT
Start: 2024-05-21

## 2024-05-21 NOTE — TELEPHONE ENCOUNTER
Refill Routing Note   Medication(s) are not appropriate for processing by [facility] for the following reason(s):        Required labs abnormal  Outside of protocol  Patient not seen by provider within 15 months    [facility] action(s):  Defer  Route               Appointments  past 12m or future 3m with PCP    Date Provider   Last Visit   1/10/2023 Landon Smith MD   Next Visit   Visit date not found Landon Smith MD   ED visits in past 90 days: 0        Note composed:11:11 AM 05/21/2024

## 2024-05-28 DIAGNOSIS — Z79.899 ENCOUNTER FOR LONG-TERM (CURRENT) USE OF MEDICATIONS: ICD-10-CM

## 2024-05-28 RX ORDER — ASPIRIN 81 MG/1
81 TABLET ORAL DAILY
Qty: 90 TABLET | Refills: 4 | Status: SHIPPED | OUTPATIENT
Start: 2024-05-28

## 2024-05-28 NOTE — TELEPHONE ENCOUNTER
----- Message from Camden Keene sent at 5/28/2024 11:38 AM CDT -----  Contact: [facility]  Marcela with [facility] is calling regarding the patients aspirin (ECOTRIN) 81 MG EC tablet being ordered as a swallow tablet and the directions say to chew. She is needing clarification on if it should be a chewable tablet instead. Please give her a call at [phone]

## 2024-05-29 ENCOUNTER — TELEPHONE (OUTPATIENT)
Dept: FAMILY MEDICINE | Facility: CLINIC | Age: 74
End: 2024-05-29
Payer: COMMERCIAL

## 2024-05-29 NOTE — TELEPHONE ENCOUNTER
----- Message from Camden Keene sent at 5/29/2024  3:14 PM CDT -----  Contact: Jose R Odell is requesting that meloxicam 15 mg be sent in to his pharmacy. He also stated he is not getting all of his medications from [facility] he is only receiving the blood pressure medication. Please give him a call back at [phone]       [facility] [id number] [facility]  [address] [facility] [address]  Phone: [phone] Fax: [phone]

## 2024-06-14 ENCOUNTER — TELEPHONE (OUTPATIENT)
Dept: FAMILY MEDICINE | Facility: CLINIC | Age: 74
End: 2024-06-14
Payer: COMMERCIAL

## 2024-06-14 NOTE — TELEPHONE ENCOUNTER
----- Message from Ana M Colvin sent at 6/14/2024 10:11 AM CDT -----  Contact: Jerilyn/Gian[facility] [phone]  Pharmacy is calling to clarify an RX.  RX name:  aspirin (ECOTRIN) 81 MG EC tablet   What do they need to clarify:  order is telling pt to chew aspirin and it is not suppose to be chewed. Can you please change order to do not chew or if provider wants chewable do not pick Ecotrin Aspirin.   Comments:

## 2024-06-19 DIAGNOSIS — E11.59 HYPERTENSION ASSOCIATED WITH DIABETES: ICD-10-CM

## 2024-06-19 DIAGNOSIS — Z79.899 ENCOUNTER FOR LONG-TERM (CURRENT) USE OF MEDICATIONS: ICD-10-CM

## 2024-06-19 DIAGNOSIS — I15.2 HYPERTENSION ASSOCIATED WITH DIABETES: ICD-10-CM

## 2024-06-19 RX ORDER — AMLODIPINE BESYLATE 10 MG/1
TABLET ORAL
Qty: 90 TABLET | Refills: 1 | Status: SHIPPED | OUTPATIENT
Start: 2024-06-19

## 2024-06-19 NOTE — TELEPHONE ENCOUNTER
Care Due:                  Date            Visit Type   Department     Provider  --------------------------------------------------------------------------------                                EP -                              PRIMARY      [facility] FAMILY  Last Visit: 01-      CARE ([facility])   MEDICINE       Landon Smith  Next Visit: None Scheduled  None         None Found                                                            Last  Test          Frequency    Reason                     Performed    Due Date  --------------------------------------------------------------------------------    Office Visit  15 months..  amLODIPine, atorvastatin,   01-   04-                             chlorthalidone,                             metFORMIN, potassium,                             semaglutide..............    CMP.........  12 months..  atorvastatin,              07- 07-                             chlorthalidone,                             metFORMIN, potassium.....    HBA1C.......  6 months...  metFORMIN, semaglutide...  07- 01-    Lipid Panel.  12 months..  atorvastatin.............  07- 07-    [facility] Embedded Care Due Messages. Reference number: [id number].   6/19/2024 1:19:31 AM CDT

## 2024-06-19 NOTE — LETTER
[facility], [place]  [address] [address] [facility] [address] [place] [address]  Phone: [phone]  Fax: [phone] June 19, 2024     Patient: Jose R Coffman   YOB: 1950   Date of Visit: 6/19/2024       To Whom It May Concern:    You are due for a visit with Dr. Smith. Please call [phone] to get scheduled.     if you have any questions or concerns, please don't hesitate to contact my office.    Sincerely,        Landon Smith MD

## 2024-07-05 ENCOUNTER — TELEPHONE (OUTPATIENT)
Dept: FAMILY MEDICINE | Facility: CLINIC | Age: 74
End: 2024-07-05
Payer: COMMERCIAL

## 2024-07-05 NOTE — TELEPHONE ENCOUNTER
----- Message from Chula Verdugo sent at 7/5/2024  9:56 AM CDT -----  Contact: Jose R  .Patient is calling to speak with the nurse regarding appt . Reports needing to schedule appt due to the patient is building up fluid and having feet swelling . Please give patient a call back at   [phone].

## 2024-07-15 DIAGNOSIS — E87.6 HYPOKALEMIA: ICD-10-CM

## 2024-07-15 DIAGNOSIS — Z79.899 ENCOUNTER FOR LONG-TERM (CURRENT) USE OF MEDICATIONS: ICD-10-CM

## 2024-07-15 RX ORDER — FAMOTIDINE 20 MG/1
TABLET, FILM COATED ORAL
Qty: 180 TABLET | Refills: 0 | Status: SHIPPED | OUTPATIENT
Start: 2024-07-15

## 2024-07-15 RX ORDER — POTASSIUM CHLORIDE 750 MG/1
CAPSULE, EXTENDED RELEASE ORAL
Qty: 90 CAPSULE | Refills: 0 | OUTPATIENT
Start: 2024-07-15

## 2024-07-15 NOTE — TELEPHONE ENCOUNTER
Refill Routing Note   Medication(s) are not appropriate for processing by [facility] for the following reason(s):        Required labs outdated  Patient not seen by provider within 15 months    [facility] action(s):  Defer               Appointments  past 12m or future 3m with PCP    Date Provider   Last Visit   1/10/2023 Landon Smith MD   Next Visit   Visit date not found Landon Smith MD   ED visits in past 90 days: 0        Note composed:10:44 AM 07/15/2024            Adult

## 2024-07-15 NOTE — TELEPHONE ENCOUNTER
No care due was identified.  [facility] Embedded Care Due Messages. Reference number: [id number].   7/15/2024 3:50:59 AM CDT

## 2024-07-18 ENCOUNTER — PATIENT OUTREACH (OUTPATIENT)
Dept: ADMINISTRATIVE | Facility: HOSPITAL | Age: 74
End: 2024-07-18
Payer: COMMERCIAL

## 2024-07-18 NOTE — PROGRESS NOTES
Pt called back says he has car trouble but can come in next Thursday for labs & glucose strip refills.

## 2024-07-18 NOTE — PROGRESS NOTES
No answer, voicemail not set up.   VBHM Score: 5     Urine Screening  Eye Exam  Hemoglobin A1c  Lipid Panel  Foot Exam    Tetanus Vaccine  Shingles/Zoster Vaccine  RSV Vaccine                  Health Maintenance Topic(s) Outreach Outcome     Additional Notes:                 Care Management, Digital Medicine, and/or Education Referrals    OPCM Risk Score: 20                 Additional Notes:

## 2024-07-24 DIAGNOSIS — E11.65 TYPE 2 DIABETES MELLITUS WITH HYPERGLYCEMIA, WITHOUT LONG-TERM CURRENT USE OF INSULIN: ICD-10-CM

## 2024-07-24 RX ORDER — SEMAGLUTIDE 0.68 MG/ML
INJECTION, SOLUTION SUBCUTANEOUS
Qty: 9 ML | Refills: 0 | Status: SHIPPED | OUTPATIENT
Start: 2024-07-24

## 2024-07-24 NOTE — TELEPHONE ENCOUNTER
No care due was identified.  Health [facility] Embedded Care Due Messages. Reference number: [id number].   7/24/2024 5:26:32 AM CDT

## 2024-07-24 NOTE — TELEPHONE ENCOUNTER
No care due was identified.  Health [facility] Embedded Care Due Messages. Reference number: [id number].   7/24/2024 11:53:39 AM CDT

## 2024-07-25 ENCOUNTER — OFFICE VISIT (OUTPATIENT)
Dept: FAMILY MEDICINE | Facility: CLINIC | Age: 74
End: 2024-07-25
Payer: COMMERCIAL

## 2024-07-25 ENCOUNTER — LAB VISIT (OUTPATIENT)
Dept: LAB | Facility: HOSPITAL | Age: 74
End: 2024-07-25
Attending: FAMILY MEDICINE
Payer: COMMERCIAL

## 2024-07-25 VITALS
OXYGEN SATURATION: 99 % | HEIGHT: 65 IN | TEMPERATURE: 98 F | BODY MASS INDEX: 26.6 KG/M2 | DIASTOLIC BLOOD PRESSURE: 77 MMHG | WEIGHT: 159.69 LBS | HEART RATE: 69 BPM | SYSTOLIC BLOOD PRESSURE: 139 MMHG

## 2024-07-25 DIAGNOSIS — E78.2 MIXED HYPERLIPIDEMIA: Chronic | ICD-10-CM

## 2024-07-25 DIAGNOSIS — Z79.899 ENCOUNTER FOR LONG-TERM (CURRENT) USE OF MEDICATIONS: Chronic | ICD-10-CM

## 2024-07-25 DIAGNOSIS — Z12.5 ENCOUNTER FOR PROSTATE CANCER SCREENING: ICD-10-CM

## 2024-07-25 DIAGNOSIS — E11.69 HYPERLIPIDEMIA ASSOCIATED WITH TYPE 2 DIABETES MELLITUS: Chronic | ICD-10-CM

## 2024-07-25 DIAGNOSIS — E78.5 HYPERLIPIDEMIA ASSOCIATED WITH TYPE 2 DIABETES MELLITUS: Chronic | ICD-10-CM

## 2024-07-25 DIAGNOSIS — R01.1 HEART MURMUR: ICD-10-CM

## 2024-07-25 DIAGNOSIS — N18.31 STAGE 3A CHRONIC KIDNEY DISEASE: Chronic | ICD-10-CM

## 2024-07-25 DIAGNOSIS — I15.2 HYPERTENSION ASSOCIATED WITH DIABETES: ICD-10-CM

## 2024-07-25 DIAGNOSIS — I69.354 HEMIPARESIS OF LEFT NONDOMINANT SIDE AS LATE EFFECT OF CEREBRAL INFARCTION: ICD-10-CM

## 2024-07-25 DIAGNOSIS — G63 NEUROPATHY DUE TO MEDICAL CONDITION: ICD-10-CM

## 2024-07-25 DIAGNOSIS — I73.9 PERIPHERAL VASCULAR DISEASE, UNSPECIFIED: Chronic | ICD-10-CM

## 2024-07-25 DIAGNOSIS — E11.59 HYPERTENSION ASSOCIATED WITH DIABETES: ICD-10-CM

## 2024-07-25 DIAGNOSIS — E11.65 TYPE 2 DIABETES MELLITUS WITH HYPERGLYCEMIA, WITHOUT LONG-TERM CURRENT USE OF INSULIN: Chronic | ICD-10-CM

## 2024-07-25 DIAGNOSIS — Z00.00 ANNUAL PHYSICAL EXAM: Primary | ICD-10-CM

## 2024-07-25 DIAGNOSIS — Z12.5 PROSTATE CANCER SCREENING: ICD-10-CM

## 2024-07-25 DIAGNOSIS — E87.6 HYPOKALEMIA: ICD-10-CM

## 2024-07-25 DIAGNOSIS — Z76.0 MEDICATION REFILL: ICD-10-CM

## 2024-07-25 DIAGNOSIS — R73.03 PREDIABETES: Chronic | ICD-10-CM

## 2024-07-25 DIAGNOSIS — Z86.73 HISTORY OF CVA (CEREBROVASCULAR ACCIDENT): ICD-10-CM

## 2024-07-25 LAB
ALBUMIN SERPL BCP-MCNC: 3.5 G/DL (ref 3.5–5.2)
ALP SERPL-CCNC: 104 U/L (ref 55–135)
ALT SERPL W/O P-5'-P-CCNC: 12 U/L (ref 10–44)
ANION GAP SERPL CALC-SCNC: 11 MMOL/L (ref 8–16)
AST SERPL-CCNC: 14 U/L (ref 10–40)
BILIRUB SERPL-MCNC: 0.5 MG/DL (ref 0.1–1)
BUN SERPL-MCNC: 15 MG/DL (ref 8–23)
CALCIUM SERPL-MCNC: 9.3 MG/DL (ref 8.7–10.5)
CHLORIDE SERPL-SCNC: 102 MMOL/L (ref 95–110)
CHOLEST SERPL-MCNC: 172 MG/DL (ref 120–199)
CHOLEST/HDLC SERPL: 4 {RATIO} (ref 2–5)
CO2 SERPL-SCNC: 27 MMOL/L (ref 23–29)
COMPLEXED PSA SERPL-MCNC: 0.79 NG/ML (ref 0–4)
CREAT SERPL-MCNC: 1.4 MG/DL (ref 0.5–1.4)
ERYTHROCYTE [DISTWIDTH] IN BLOOD BY AUTOMATED COUNT: 13.5 % (ref 11.5–14.5)
EST. GFR  (NO RACE VARIABLE): 52.7 ML/MIN/1.73 M^2
ESTIMATED AVG GLUCOSE: 157 MG/DL (ref 68–131)
GLUCOSE SERPL-MCNC: 136 MG/DL (ref 70–110)
HBA1C MFR BLD: 7.1 % (ref 4–5.6)
HCT VFR BLD AUTO: 44.4 % (ref 40–54)
HDLC SERPL-MCNC: 43 MG/DL (ref 40–75)
HDLC SERPL: 25 % (ref 20–50)
HGB BLD-MCNC: 14.1 G/DL (ref 14–18)
LDLC SERPL CALC-MCNC: 112.4 MG/DL (ref 63–159)
MCH RBC QN AUTO: 28 PG (ref 27–31)
MCHC RBC AUTO-ENTMCNC: 31.8 G/DL (ref 32–36)
MCV RBC AUTO: 88 FL (ref 82–98)
NONHDLC SERPL-MCNC: 129 MG/DL
PLATELET # BLD AUTO: 296 K/UL (ref 150–450)
PMV BLD AUTO: 10.6 FL (ref 9.2–12.9)
POTASSIUM SERPL-SCNC: 3.4 MMOL/L (ref 3.5–5.1)
PROT SERPL-MCNC: 8 G/DL (ref 6–8.4)
RBC # BLD AUTO: 5.03 M/UL (ref 4.6–6.2)
SODIUM SERPL-SCNC: 140 MMOL/L (ref 136–145)
TRIGL SERPL-MCNC: 83 MG/DL (ref 30–150)
TSH SERPL DL<=0.005 MIU/L-ACNC: 1.65 UIU/ML (ref 0.4–4)
WBC # BLD AUTO: 6.16 K/UL (ref 3.9–12.7)

## 2024-07-25 PROCEDURE — 80061 LIPID PANEL: CPT | Performed by: FAMILY MEDICINE

## 2024-07-25 PROCEDURE — 99397 PER PM REEVAL EST PAT 65+ YR: CPT | Mod: S$GLB,,, | Performed by: NURSE PRACTITIONER

## 2024-07-25 PROCEDURE — 99999 PR PBB SHADOW E&M-EST. PATIENT-LVL V: CPT | Mod: PBBFAC,,, | Performed by: NURSE PRACTITIONER

## 2024-07-25 PROCEDURE — 1159F MED LIST DOCD IN RCRD: CPT | Mod: CPTII,S$GLB,, | Performed by: NURSE PRACTITIONER

## 2024-07-25 PROCEDURE — 3075F SYST BP GE 130 - 139MM HG: CPT | Mod: CPTII,S$GLB,, | Performed by: NURSE PRACTITIONER

## 2024-07-25 PROCEDURE — 1160F RVW MEDS BY RX/DR IN RCRD: CPT | Mod: CPTII,S$GLB,, | Performed by: NURSE PRACTITIONER

## 2024-07-25 PROCEDURE — 84443 ASSAY THYROID STIM HORMONE: CPT | Performed by: FAMILY MEDICINE

## 2024-07-25 PROCEDURE — 3008F BODY MASS INDEX DOCD: CPT | Mod: CPTII,S$GLB,, | Performed by: NURSE PRACTITIONER

## 2024-07-25 PROCEDURE — 85027 COMPLETE CBC AUTOMATED: CPT | Performed by: FAMILY MEDICINE

## 2024-07-25 PROCEDURE — 84153 ASSAY OF PSA TOTAL: CPT | Performed by: FAMILY MEDICINE

## 2024-07-25 PROCEDURE — 1126F AMNT PAIN NOTED NONE PRSNT: CPT | Mod: CPTII,S$GLB,, | Performed by: NURSE PRACTITIONER

## 2024-07-25 PROCEDURE — 83036 HEMOGLOBIN GLYCOSYLATED A1C: CPT | Performed by: FAMILY MEDICINE

## 2024-07-25 PROCEDURE — 1101F PT FALLS ASSESS-DOCD LE1/YR: CPT | Mod: CPTII,S$GLB,, | Performed by: NURSE PRACTITIONER

## 2024-07-25 PROCEDURE — 3078F DIAST BP <80 MM HG: CPT | Mod: CPTII,S$GLB,, | Performed by: NURSE PRACTITIONER

## 2024-07-25 PROCEDURE — 36415 COLL VENOUS BLD VENIPUNCTURE: CPT | Mod: PO | Performed by: FAMILY MEDICINE

## 2024-07-25 PROCEDURE — 80053 COMPREHEN METABOLIC PANEL: CPT | Performed by: FAMILY MEDICINE

## 2024-07-25 PROCEDURE — 3288F FALL RISK ASSESSMENT DOCD: CPT | Mod: CPTII,S$GLB,, | Performed by: NURSE PRACTITIONER

## 2024-07-25 RX ORDER — METFORMIN HYDROCHLORIDE 500 MG/1
TABLET, EXTENDED RELEASE ORAL
Qty: 90 TABLET | Refills: 4 | Status: SHIPPED | OUTPATIENT
Start: 2024-07-25

## 2024-07-25 RX ORDER — POTASSIUM CHLORIDE 750 MG/1
10 CAPSULE, EXTENDED RELEASE ORAL DAILY
Qty: 90 CAPSULE | Refills: 3 | Status: SHIPPED | OUTPATIENT
Start: 2024-07-25

## 2024-07-25 RX ORDER — FAMOTIDINE 20 MG/1
TABLET, FILM COATED ORAL
Qty: 180 TABLET | Refills: 3 | Status: SHIPPED | OUTPATIENT
Start: 2024-07-25

## 2024-07-25 RX ORDER — AMLODIPINE BESYLATE 10 MG/1
10 TABLET ORAL DAILY
Qty: 90 TABLET | Refills: 3 | Status: SHIPPED | OUTPATIENT
Start: 2024-07-25

## 2024-07-25 NOTE — PATIENT INSTRUCTIONS
[place] eye exam  Continue current plan of care  RTC as needed  Report to ER immediately if symptoms worsen or persist    Brendan Odell,     If you are due for any health screening(s) below please notify me so we can arrange them to be ordered and scheduled. Most healthy patients at your age complete them, but you are free to accept or refuse.     If you can't do it, I'll definitely understand. If you can, I'd certainly appreciate it!    Tests to Keep You Healthy    Eye Exam: DUE  Colon Cancer Screening: Met on 1/29/2020  Last HbA1c < 8 (07/11/2023): NO      Your diabetic retinal eye exam is due     Diabetes is the #1 cause of blindness in the [place] - early detection before signs or symptoms develop can prevent debilitating blindness.     Our records indicate that you may be overdue for your annual diabetic eye exam. Eye screening can help identify patients at risk for developing vision loss which is common in diabetes. This simple screening is an important step to keeping you healthy and preventing complications from diabetes.     This recommended diabetic eye exam should take place once per year and can prevent and treat diabetes complications in the eye before developing symptoms. This can be done with a special camera is used to take photographs of the back of your eye without having to dilate them, or you can see an eye doctor for a full dilated exam.     If you recently had your yearly diabetic eye exam performed outside of [facility], please let your Health care team know so that they can update your health record.

## 2024-07-25 NOTE — PROGRESS NOTES
Subjective     Patient ID: Jose R Coffman is a 74 y.o. male.    Chief Complaint: Medication Refill and Annual Exam  Pt in today for annual exam. Type 2 DM, HTN, hyperlipidemia, hypokalemia managed with medication. PVD managed by cardiology. History of CVA; maintains good BP, hyperlipidemia, DM control. Labs, eye/foot exam due. Pt states will schedule eye exam with external provider.   Past Medical History:   Diagnosis Date    Hyperlipidemia     Hypertension     Stroke      Social History     Socioeconomic History    Marital status:    Tobacco Use    Smoking status: Never    Smokeless tobacco: Never   Substance and Sexual Activity    Alcohol use: Never    Drug use: Never    Sexual activity: Not Currently     History reviewed. No pertinent surgical history.    HPI  Review of Systems   Constitutional: Negative.    HENT: Negative.     Eyes: Negative.    Respiratory: Negative.     Cardiovascular: Negative.    Gastrointestinal: Negative.    Endocrine: Negative.    Genitourinary: Negative.    Musculoskeletal: Negative.    Integumentary:  Negative.   Allergic/Immunologic: Negative.    Neurological: Negative.    Psychiatric/Behavioral: Negative.            Objective     Physical Exam  Vitals and nursing note reviewed.   Constitutional:       Appearance: Normal appearance.   HENT:      Head: Normocephalic.      Right Ear: Tympanic membrane, ear canal and external ear normal.      Left Ear: Tympanic membrane, ear canal and external ear normal.      Nose: Nose normal.      Mouth/Throat:      Mouth: Mucous membranes are moist.      Pharynx: Oropharynx is clear.   Eyes:      Conjunctiva/sclera: Conjunctivae normal.      Pupils: Pupils are equal, round, and reactive to light.   Cardiovascular:      Rate and Rhythm: Normal rate and regular rhythm.      Pulses: Normal pulses.      Heart sounds: Normal heart sounds.   Pulmonary:      Effort: Pulmonary effort is normal.      Breath sounds: Normal breath sounds.   Abdominal:       General: Bowel sounds are normal.      Palpations: Abdomen is soft.   Musculoskeletal:         General: Normal range of motion.      Cervical back: Normal range of motion and neck supple.   Skin:     General: Skin is warm and dry.      Capillary Refill: Capillary refill takes 2 to 3 seconds.   Neurological:      Mental Status: He is alert and oriented to person, place, and time.   Psychiatric:         Mood and Affect: Mood normal.         Behavior: Behavior normal.         Thought Content: Thought content normal.         Judgment: Judgment normal.            Assessment and Plan     Jose R was seen today for medication refill and annual exam.    Diagnoses and all orders for this visit:    Annual physical exam  Type 2 diabetes mellitus with hyperglycemia, without long-term current use of insulin  Stage 3a chronic kidney disease  Hypertension associated with diabetes  Hyperlipidemia associated with type 2 diabetes mellitus  Hypokalemia  Heart murmur  Peripheral vascular disease, unspecified  History of CVA  Prostate cancer screening  Medication refill  Encounter for long-term (current) use of medications  -     HEMOGLOBIN A1C; Future  -     MICROALBUMIN / CREATININE RATIO URINE  -     Lipid Panel; Future  -     TSH; Future  -     CBC Without Differential; Future  -     Comprehensive Metabolic Panel; Future  -     PSA, Screening; Future  -     metFORMIN (GLUCOPHAGE-XR) 500 MG ER 24hr tablet; TAKE 1 TABLET DAILY WITH BREAKFAST  -     amLODIPine (NORVASC) 10 MG tablet; Take 1 tablet (10 mg total) by mouth once daily.  -     potassium chloride (MICRO-K) 10 MEQ CpSR; Take 1 capsule (10 mEq total) by mouth once daily.  -     amLODIPine (NORVASC) 10 MG tablet; Take 1 tablet (10 mg total) by mouth once daily.  -     famotidine (PEPCID) 20 MG tablet; TAKE ONE TABLET BY MOUTH TWICE DAILY @9AM-5PM  -     metFORMIN (GLUCOPHAGE-XR) 500 MG ER 24hr tablet; TAKE 1 TABLET DAILY WITH BREAKFAST  -     potassium chloride (MICRO-K) 10  MEQ CpSR; Take 1 capsule (10 mEq total) by mouth once daily.    Diabetic foot exam:   Left: Reflexes 2+    Vibratory sensation normal   Proprioception normal   Sharp/dull discrimination normal   Filament test present  Right: Reflexes 2+    Vibratory sensation normal   Proprioception normal   Sharp/dull discrimination normal   Filament test present                 No follow-ups on file.

## 2024-07-26 NOTE — PROGRESS NOTES
Make follow-up lab appointment per recommendation below.  Check to see if patient has seen the results through my chart.  If not then,  #CALL THE PATIENT# to discuss results/see if they have questions and document verification of contact. Make F/U appt if needed. [phone]    Metabolic panel is mostly stable except for slight decrease in kidney function and low potassium.  Increase potassium supplement.  Please follow-up to discuss in detail.  Increase hydration with water.  Avoid anti-inflammatory medication.  Glucose is elevated, see A1c below.  Blood counts are stable.  A1c is elevated from previous.  Level 7.1 consistent with average blood sugar of 157 over the last three months.  Follow-up to discuss diabetes.  Thyroid TSH test is normal.  Lipid panel is stable.  PSA screening for prostate cancer is within normal limits.  Repeat annually.

## 2024-08-01 DIAGNOSIS — E11.65 TYPE 2 DIABETES MELLITUS WITH HYPERGLYCEMIA, WITHOUT LONG-TERM CURRENT USE OF INSULIN: ICD-10-CM

## 2024-08-01 RX ORDER — INSULIN PUMP SYRINGE, 3 ML
EACH MISCELLANEOUS
Qty: 1 EACH | Refills: 0 | Status: SHIPPED | OUTPATIENT
Start: 2024-08-01

## 2024-08-01 RX ORDER — LANCETS
1 EACH MISCELLANEOUS DAILY PRN
Qty: 100 EACH | Refills: 11 | Status: SHIPPED | OUTPATIENT
Start: 2024-08-01

## 2024-08-01 NOTE — TELEPHONE ENCOUNTER
No care due was identified.  Health [facility] Embedded Care Due Messages. Reference number: [id number].   8/01/2024 12:13:29 PM CDT

## 2024-08-06 DIAGNOSIS — Z79.899 ENCOUNTER FOR LONG-TERM (CURRENT) USE OF MEDICATIONS: Chronic | ICD-10-CM

## 2024-08-06 DIAGNOSIS — E87.6 HYPOKALEMIA: ICD-10-CM

## 2024-08-06 RX ORDER — POTASSIUM CHLORIDE 750 MG/1
CAPSULE, EXTENDED RELEASE ORAL
Qty: 90 CAPSULE | Refills: 0 | OUTPATIENT
Start: 2024-08-06

## 2024-09-05 DIAGNOSIS — E11.69 HYPERLIPIDEMIA ASSOCIATED WITH TYPE 2 DIABETES MELLITUS: ICD-10-CM

## 2024-09-05 DIAGNOSIS — Z79.899 ENCOUNTER FOR LONG-TERM (CURRENT) USE OF MEDICATIONS: ICD-10-CM

## 2024-09-05 DIAGNOSIS — E78.5 HYPERLIPIDEMIA ASSOCIATED WITH TYPE 2 DIABETES MELLITUS: ICD-10-CM

## 2024-09-05 RX ORDER — ATORVASTATIN CALCIUM 80 MG/1
TABLET, FILM COATED ORAL
Qty: 90 TABLET | Refills: 1 | Status: SHIPPED | OUTPATIENT
Start: 2024-09-05

## 2024-09-26 ENCOUNTER — OFFICE VISIT (OUTPATIENT)
Dept: FAMILY MEDICINE | Facility: CLINIC | Age: 74
End: 2024-09-26
Payer: MEDICARE

## 2024-09-26 ENCOUNTER — PATIENT OUTREACH (OUTPATIENT)
Dept: ADMINISTRATIVE | Facility: HOSPITAL | Age: 74
End: 2024-09-26
Payer: MEDICARE

## 2024-09-26 VITALS
OXYGEN SATURATION: 98 % | HEART RATE: 68 BPM | WEIGHT: 156.19 LBS | HEIGHT: 65 IN | BODY MASS INDEX: 26.02 KG/M2 | TEMPERATURE: 98 F | DIASTOLIC BLOOD PRESSURE: 63 MMHG | SYSTOLIC BLOOD PRESSURE: 110 MMHG

## 2024-09-26 DIAGNOSIS — G89.29 CHRONIC LEFT SHOULDER PAIN: Chronic | ICD-10-CM

## 2024-09-26 DIAGNOSIS — E78.5 HYPERLIPIDEMIA ASSOCIATED WITH TYPE 2 DIABETES MELLITUS: Chronic | ICD-10-CM

## 2024-09-26 DIAGNOSIS — E11.59 HYPERTENSION ASSOCIATED WITH DIABETES: ICD-10-CM

## 2024-09-26 DIAGNOSIS — G31.9 DEGENERATIVE DISEASE OF NERVOUS SYSTEM, UNSPECIFIED: ICD-10-CM

## 2024-09-26 DIAGNOSIS — E11.69 HYPERLIPIDEMIA ASSOCIATED WITH TYPE 2 DIABETES MELLITUS: Chronic | ICD-10-CM

## 2024-09-26 DIAGNOSIS — Z86.73 HISTORY OF CVA (CEREBROVASCULAR ACCIDENT): ICD-10-CM

## 2024-09-26 DIAGNOSIS — I69.354 HEMIPARESIS OF LEFT NONDOMINANT SIDE AS LATE EFFECT OF CEREBRAL INFARCTION: Chronic | ICD-10-CM

## 2024-09-26 DIAGNOSIS — G63 POLYNEUROPATHY IN DISEASES CLASSIFIED ELSEWHERE: Chronic | ICD-10-CM

## 2024-09-26 DIAGNOSIS — Z00.00 ENCOUNTER FOR PREVENTIVE HEALTH EXAMINATION: ICD-10-CM

## 2024-09-26 DIAGNOSIS — Z00.00 ENCOUNTER FOR PREVENTIVE CARE: Primary | ICD-10-CM

## 2024-09-26 DIAGNOSIS — Z79.899 ENCOUNTER FOR LONG-TERM (CURRENT) USE OF MEDICATIONS: Chronic | ICD-10-CM

## 2024-09-26 DIAGNOSIS — N18.31 STAGE 3A CHRONIC KIDNEY DISEASE: Chronic | ICD-10-CM

## 2024-09-26 DIAGNOSIS — M25.512 CHRONIC LEFT SHOULDER PAIN: Chronic | ICD-10-CM

## 2024-09-26 DIAGNOSIS — I15.2 HYPERTENSION ASSOCIATED WITH DIABETES: ICD-10-CM

## 2024-09-26 DIAGNOSIS — E11.65 TYPE 2 DIABETES MELLITUS WITH HYPERGLYCEMIA, WITHOUT LONG-TERM CURRENT USE OF INSULIN: Chronic | ICD-10-CM

## 2024-09-26 DIAGNOSIS — I73.9 PERIPHERAL VASCULAR DISEASE, UNSPECIFIED: Chronic | ICD-10-CM

## 2024-09-26 PROCEDURE — 99999 PR PBB SHADOW E&M-EST. PATIENT-LVL IV: CPT | Mod: PBBFAC,,, | Performed by: NURSE PRACTITIONER

## 2024-09-26 NOTE — PROGRESS NOTES
"  Jos eR Coffman presented for a follow-up Medicare AWV today. The following components were reviewed and updated:    Medical history  Family History  Social history  Allergies and Current Medications  Health Risk Assessment  Health Maintenance  Care Team    **See Completed Assessments for Annual Wellness visit with in the encounter summary  The following assessments were completed:  Depression Screening  Cognitive function Screening  Timed Get Up Test  Whisper Test  PHQ-2  Nutrition screen  ADL  PAQ  Osteoporosis risk  CAGE  Living situation    Opioid documentation:      Patient does not have a current opioid prescription.          Vitals:    09/26/24 1045   BP: 110/63   Pulse: 68   Temp: 98.1 °F (36.7 °C)   SpO2: 98%   Weight: 70.9 kg (156 lb 3.2 oz)   Height: 5' 5" (1.651 m)     Body mass index is 25.99 kg/m².             Diagnoses and health risks identified today and associated recommendations/orders:  1. Encounter for preventive care  Stable  Up to date    2. Hemiparesis of left nondominant side as late effect of cerebral infarction  Stable   Continue current plan of care    3. Stage 3a chronic kidney disease  Stable   Managed by PCP  Consider nephrology if worsening  Continue current medications, plan of care  F/U with PCP as advised    4. Polyneuropathy in diseases classified elsewhere  Stable  Managed by PCP  Continue current medications, plan of care  F/U with PCP as advised    5. Degenerative disease of nervous system, unspecified  Stable  Managed by PCP  Continue current medications, plan of care  F/U with PCP as advised    6. Type 2 diabetes mellitus with hyperglycemia, without long-term current use of insulin  Stable  Managed by PCP  Declines scheduling eye exam at this time  Continue current medications, plan of care  F/U with PCP as advised    7. Hypertension associated with diabetes  Stable  Managed by PCP, cardiology  Low sodium diet recommended  Continue current medications, plan of care  F/U with " PCP as advised    8. Hyperlipidemia associated with type 2 diabetes mellitus  Stable  Managed by PCP, cardiology  Low cholesterol diet recommended  Continue current medications, plan of care  F/U with PCP as advised    9. Peripheral vascular disease, unspecified  Stable  Managed by PCP, cardiology  Continue current medications, plan of care  F/U with PCP as advised    10. Chronic left shoulder pain  Stable  Managed by PCP  Continue current medications, plan of care  F/U with PCP as advised    11. History of CVA (cerebrovascular accident)  Stable  Sees neurology  Continue current plan of care    12. Encounter for long-term (current) use of medications  Stable  Continue current plan of care    I offered to discuss end of life issues, including information on how to make advance directives that the patient could use to name someone who would make medical decisions on their behalf if they became too ill to make themselves.    ___Patient declined - already done.    _x__Patient is interested, I provided paperwork and offered to discuss   Provided Jose R with a 5-10 year written screening schedule and personal prevention plan. Recommendations were developed using the USPSTF age appropriate recommendations. Education, counseling, and referrals were provided as needed.  After Visit Summary printed and given to patient which includes a list of additional screenings\tests needed.    No follow-ups on file.      Eileen Zuñiga, DIANA    I offered to discuss advanced care planning, including how to pick a person who would make decisions for you if you were unable to make them for yourself, called a health care power of , and what kind of decisions you might make such as use of life sustaining treatments such as ventilators and tube feeding when faced with a life limiting illness recorded on a living will that they will need to know. (How you want to be cared for as you near the end of your natural life)     X  Patient is interested in learning more about how to make advanced directives.  I provided them paperwork and offered to discuss this with them.

## 2024-09-26 NOTE — PATIENT INSTRUCTIONS
Continue current plan of care  F/U with PCP, specialists as advised  RTC as needed  Report to ER immediately if symptoms worsen or persist    Brendan Odell,     If you are due for any health screening(s) below please notify me so we can arrange them to be ordered and scheduled. Most healthy patients at your age complete them, but you are free to accept or refuse.     If you can't do it, I'll definitely understand. If you can, I'd certainly appreciate it!    Tests to Keep You Healthy    Eye Exam: DUE  Colon Cancer Screening: Met on 1/29/2020  Last Blood Pressure <= 139/89 (9/26/2024): Yes  Last HbA1c < 8 (07/25/2024): Yes      Your diabetic retinal eye exam is due     Diabetes is the #1 cause of blindness in the [place] - early detection before signs or symptoms develop can prevent debilitating blindness.     Our records indicate that you may be overdue for your annual diabetic eye exam. Eye screening can help identify patients at risk for developing vision loss which is common in diabetes. This simple screening is an important step to keeping you healthy and preventing complications from diabetes.     This recommended diabetic eye exam should take place once per year and can prevent and treat diabetes complications in the eye before developing symptoms. This can be done with a special camera is used to take photographs of the back of your eye without having to dilate them, or you can see an eye doctor for a full dilated exam.     If you recently had your yearly diabetic eye exam performed outside of [facility], please let your Health care team know so that they can update your health record.                    Counseling and Referral of Other Preventative  (Italic type indicates deductible and co-insurance are waived)    Patient Name: Jose R Coffman  Today's Date: 9/26/2024    Health Maintenance       Date Due Completion Date    TETANUS VACCINE Never done ---    Shingles Vaccine (1 of 2) Never done ---    RSV  Vaccine (Age 60+ and Pregnant patients) (1 - 1-dose 60+ series) Never done ---    Eye Exam 01/09/2021 1/9/2020    Influenza Vaccine (1) 09/01/2024 1/10/2023    COVID-19 Vaccine (4 - 2023-24 season) 09/01/2024 2/8/2022    Hemoglobin A1c 01/25/2025 7/25/2024    PROSTATE-SPECIFIC ANTIGEN 07/25/2025 7/25/2024    Diabetes Urine Screening 07/25/2025 7/25/2024    Foot Exam 07/25/2025 7/25/2024 (Done)    Override on 7/25/2024: Done    Override on 1/10/2023: Done    Lipid Panel 07/25/2025 7/25/2024    High Dose Statin 09/26/2025 9/26/2024    Colorectal Cancer Screening 01/29/2030 1/29/2020        No orders of the defined types were placed in this encounter.      The following information is provided to all patients.  This information is to help you find resources for any of the problems found today that may be affecting your health:                  Living healthy guide: www.[facility].[place].gov      Understanding Diabetes: www.diabetes.org      Eating healthy: www.cdc.gov/healthyweight      CDC home safety checklist: www.cdc.gov/steadi/patient.html      Agency on Aging: www.goea.[place].[facility]      Alcoholics anonymous (AA): www.aa.org      Physical Activity: www.brenna.nih.gov/dz2hpgq      Tobacco use: www.quitwithusla.org

## 2024-09-27 DIAGNOSIS — E11.65 TYPE 2 DIABETES MELLITUS WITH HYPERGLYCEMIA, WITHOUT LONG-TERM CURRENT USE OF INSULIN: ICD-10-CM

## 2024-09-27 RX ORDER — SEMAGLUTIDE 0.68 MG/ML
INJECTION, SOLUTION SUBCUTANEOUS
Qty: 9 ML | Refills: 2 | Status: SHIPPED | OUTPATIENT
Start: 2024-09-27

## 2024-09-27 NOTE — TELEPHONE ENCOUNTER
No care due was identified.  [facility] Embedded Care Due Messages. Reference number: [id number].   9/27/2024 1:13:49 PM CDT

## 2024-11-01 ENCOUNTER — PATIENT OUTREACH (OUTPATIENT)
Dept: ADMINISTRATIVE | Facility: HOSPITAL | Age: 74
End: 2024-11-01
Payer: MEDICARE

## 2025-02-06 DIAGNOSIS — E78.5 HYPERLIPIDEMIA ASSOCIATED WITH TYPE 2 DIABETES MELLITUS: ICD-10-CM

## 2025-02-06 DIAGNOSIS — E11.69 HYPERLIPIDEMIA ASSOCIATED WITH TYPE 2 DIABETES MELLITUS: ICD-10-CM

## 2025-02-06 DIAGNOSIS — Z79.899 ENCOUNTER FOR LONG-TERM (CURRENT) USE OF MEDICATIONS: ICD-10-CM

## 2025-02-06 RX ORDER — ATORVASTATIN CALCIUM 80 MG/1
TABLET, FILM COATED ORAL
Qty: 90 TABLET | Refills: 0 | Status: SHIPPED | OUTPATIENT
Start: 2025-02-06

## 2025-02-06 NOTE — TELEPHONE ENCOUNTER
Refill Routing Note   Medication(s) are not appropriate for processing by [facility] for the following reason(s):        Patient not seen by provider within 15 months    [facility] action(s):  Defer   Requires appointment : Yes               Appointments  past 12m or future 3m with PCP    Date Provider   Last Visit   1/10/2023 Landon Smith MD   Next Visit   Visit date not found Landon Smith MD   ED visits in past 90 days: 0        Note composed:11:30 AM 02/06/2025

## 2025-02-06 NOTE — TELEPHONE ENCOUNTER
Care Due:                  Date            Visit Type   Department     Provider  --------------------------------------------------------------------------------    Last Visit: None Found      None         None Found  Next Visit: None Scheduled  None         None Found                                                            Last  Test          Frequency    Reason                     Performed    Due Date  --------------------------------------------------------------------------------    Office Visit  15 months..  atorvastatin,              Not Found    Overdue                             chlorthalidone...........    [employer] Embedded Care Due Messages. Reference number: [id number].   2/06/2025 10:01:47 AM CST

## 2025-03-14 DIAGNOSIS — Z79.899 ENCOUNTER FOR LONG-TERM (CURRENT) USE OF MEDICATIONS: ICD-10-CM

## 2025-03-14 DIAGNOSIS — E11.59 HYPERTENSION ASSOCIATED WITH DIABETES: ICD-10-CM

## 2025-03-14 DIAGNOSIS — I15.2 HYPERTENSION ASSOCIATED WITH DIABETES: ICD-10-CM

## 2025-03-14 RX ORDER — CHLORTHALIDONE 25 MG/1
TABLET ORAL
Qty: 90 TABLET | Refills: 0 | Status: SHIPPED | OUTPATIENT
Start: 2025-03-14

## 2025-03-14 RX ORDER — CHLORTHALIDONE 25 MG/1
25 TABLET ORAL DAILY
Qty: 90 TABLET | Refills: 0 | OUTPATIENT
Start: 2025-03-14

## 2025-03-14 NOTE — TELEPHONE ENCOUNTER
Refill Routing Note   Medication(s) are not appropriate for processing by [facility] for the following reason(s):        Patient not seen by provider within 15 months    [facility] action(s):  Defer               Appointments  past 12m or future 3m with PCP    Date Provider   Last Visit   1/10/2023 Landon Smith MD   Next Visit   Visit date not found Landon Smith MD   ED visits in past 90 days: 0        Note composed:1:50 PM 03/14/2025

## 2025-03-14 NOTE — TELEPHONE ENCOUNTER
No care due was identified.  [facility] Embedded Care Due Messages. Reference number: [id number].   3/14/2025 10:26:13 AM CDT

## 2025-03-14 NOTE — TELEPHONE ENCOUNTER
No care due was identified.  [facility] Embedded Care Due Messages. Reference number: [id number].   3/14/2025 2:06:39 PM CDT

## 2025-03-14 NOTE — TELEPHONE ENCOUNTER
Refill Decision Note    Quick DC. Request already responded to by other means (e.g. phone or fax)      Jose R Coffman  is requesting a refill authorization.  Brief Assessment and Rationale for Refill:  Defer     Medication Therapy Plan: Signed by: Britany Reis NP 3/14/25      Comments:     Note composed:5:04 PM 03/14/2025

## 2025-03-14 NOTE — TELEPHONE ENCOUNTER
Copied from CRM #[id number]. Topic: Medications - Medication Refill  >> Mar 14, 2025  1:39 PM David wrote:  .Type:  RX Refill Request    Who Called:  Bryanna/ [facility]  Refill or New Rx: Refill   RX Name and Strength: chlorthalidone (HYGROTEN) 25 MG Tab  How is the patient currently taking it? (ex. 1XDay): 1 x day   Is this a 30 day or 90 day RX: 90 day   Preferred Pharmacy with phone number: [facility] ([place] - [address] [address]  Phone: [phone] Fax: [phone]  Local or Mail Order: Mail Order   Ordering Provider: Landon Smith   Would the patient rather a call back or a response via [facility]?  No   Best Call Back Number:   Additional Information:

## 2025-03-19 ENCOUNTER — TELEPHONE (OUTPATIENT)
Dept: FAMILY MEDICINE | Facility: CLINIC | Age: 75
End: 2025-03-19
Payer: MEDICARE

## 2025-03-19 DIAGNOSIS — Z79.899 ENCOUNTER FOR LONG-TERM (CURRENT) USE OF MEDICATIONS: ICD-10-CM

## 2025-03-19 DIAGNOSIS — E11.59 HYPERTENSION ASSOCIATED WITH DIABETES: ICD-10-CM

## 2025-03-19 DIAGNOSIS — I15.2 HYPERTENSION ASSOCIATED WITH DIABETES: ICD-10-CM

## 2025-03-19 NOTE — TELEPHONE ENCOUNTER
----- Message from Deedee sent at 3/19/2025 11:10 AM CDT -----  Contact: Twila with [facility]  Type:  RX Refill RequestWho Called: Jose R Refill or New Rx:refillRX Name and Strength:chlorthalidone (HYGROTEN) 25 MG Tab How is the patient currently taking it? (ex. 1XDay): Is this a 30 day or 90 day RX: Preferred Pharmacy with phone number:[facility] [place] - [facility] [address] [place], [place] [address] [place] [address] [place] [address]Tptct: [phone] Fax: [phone] or Mail Order:Mail Order Ordering Provider:Luis Would the patient rather a call back or a response via [facility]? Call Best Call Back Number:[phone] Information: Please call for additional information

## 2025-04-11 ENCOUNTER — PATIENT OUTREACH (OUTPATIENT)
Dept: ADMINISTRATIVE | Facility: HOSPITAL | Age: 75
End: 2025-04-11
Payer: MEDICARE

## 2025-04-11 DIAGNOSIS — G63 NEUROPATHY DUE TO MEDICAL CONDITION: ICD-10-CM

## 2025-04-11 RX ORDER — GABAPENTIN 400 MG/1
CAPSULE ORAL
Qty: 270 CAPSULE | Refills: 11 | Status: SHIPPED | OUTPATIENT
Start: 2025-04-11

## 2025-04-11 NOTE — TELEPHONE ENCOUNTER
Care Due:                  Date            Visit Type   Department     Provider  --------------------------------------------------------------------------------    Last Visit: None Found      None         None Found  Next Visit: None Scheduled  None         None Found                                                            Last  Test          Frequency    Reason                     Performed    Due Date  --------------------------------------------------------------------------------    Office Visit  15 months..  atorvastatin.............  Not Found    Overdue    [employer] Embedded Care Due Messages. Reference number: [id number].   4/11/2025 3:49:24 AM CDT

## 2025-05-07 ENCOUNTER — PATIENT OUTREACH (OUTPATIENT)
Dept: ADMINISTRATIVE | Facility: HOSPITAL | Age: 75
End: 2025-05-07
Payer: MEDICARE

## 2025-05-07 NOTE — PROGRESS NOTES
VBC Program: Called Pt about overdue HM topics, No answer, VM not set up.     VBHM Score: 2      Eye Exam  Hemoglobin A1c     Tetanus Vaccine  Shingles/Zoster Vaccine  RSV Vaccine

## 2025-05-09 DIAGNOSIS — E11.69 HYPERLIPIDEMIA ASSOCIATED WITH TYPE 2 DIABETES MELLITUS: ICD-10-CM

## 2025-05-09 DIAGNOSIS — E78.5 HYPERLIPIDEMIA ASSOCIATED WITH TYPE 2 DIABETES MELLITUS: ICD-10-CM

## 2025-05-09 DIAGNOSIS — Z79.899 ENCOUNTER FOR LONG-TERM (CURRENT) USE OF MEDICATIONS: ICD-10-CM

## 2025-05-09 RX ORDER — ATORVASTATIN CALCIUM 80 MG/1
TABLET, FILM COATED ORAL
Qty: 90 TABLET | Refills: 1 | Status: SHIPPED | OUTPATIENT
Start: 2025-05-09

## 2025-05-09 NOTE — TELEPHONE ENCOUNTER
Care Due:                  Date            Visit Type   Department     Provider  --------------------------------------------------------------------------------    Last Visit: None Found      None         None Found  Next Visit: None Scheduled  None         None Found                                                            Last  Test          Frequency    Reason                     Performed    Due Date  --------------------------------------------------------------------------------    CMP.........  12 months..  atorvastatin.............  07- 07-    Lipid Panel.  12 months..  atorvastatin.............  07- 07-    [employer] Embedded Care Due Messages. Reference number: [id number].   5/09/2025 10:11:15 AM CDT

## 2025-05-16 ENCOUNTER — PATIENT OUTREACH (OUTPATIENT)
Dept: ADMINISTRATIVE | Facility: HOSPITAL | Age: 75
End: 2025-05-16
Payer: MEDICARE

## 2025-05-29 RX ORDER — ASPIRIN 81 MG/1
TABLET ORAL
Qty: 90 TABLET | Refills: 11 | Status: SHIPPED | OUTPATIENT
Start: 2025-05-29

## 2025-06-10 DIAGNOSIS — Z79.899 ENCOUNTER FOR LONG-TERM (CURRENT) USE OF MEDICATIONS: ICD-10-CM

## 2025-06-10 DIAGNOSIS — E11.59 HYPERTENSION ASSOCIATED WITH DIABETES: ICD-10-CM

## 2025-06-10 DIAGNOSIS — I15.2 HYPERTENSION ASSOCIATED WITH DIABETES: ICD-10-CM

## 2025-06-10 RX ORDER — CHLORTHALIDONE 25 MG/1
TABLET ORAL
Qty: 90 TABLET | Refills: 0 | Status: SHIPPED | OUTPATIENT
Start: 2025-06-10

## 2025-06-10 NOTE — TELEPHONE ENCOUNTER
Refill Routing Note   Medication(s) are not appropriate for processing by [facility] for the following reason(s):        Non-participating provider    [facility] action(s):  Route               Appointments  past 12m or future 3m with PCP    Date Provider   Last Visit   7/11/2023 Britany Reis NP   Next Visit   Visit date not found Britany Reis NP   ED visits in past 90 days: 0        Note composed:11:58 AM 06/10/2025

## 2025-06-17 ENCOUNTER — PATIENT OUTREACH (OUTPATIENT)
Dept: ADMINISTRATIVE | Facility: HOSPITAL | Age: 75
End: 2025-06-17
Payer: MEDICARE

## 2025-06-17 DIAGNOSIS — E11.65 TYPE 2 DIABETES MELLITUS WITH HYPERGLYCEMIA, WITHOUT LONG-TERM CURRENT USE OF INSULIN: Primary | Chronic | ICD-10-CM

## 2025-06-17 NOTE — PROGRESS NOTES
Called Pt per my Remind me to schedule Visit & overdue labs Pt says schedule me early for next month. NP visit & DM labs scheduled & linked. Also schedule PCP visit & labs for next year.

## 2025-07-08 DIAGNOSIS — Z79.899 ENCOUNTER FOR LONG-TERM (CURRENT) USE OF MEDICATIONS: Chronic | ICD-10-CM

## 2025-07-08 DIAGNOSIS — E87.6 HYPOKALEMIA: ICD-10-CM

## 2025-07-08 RX ORDER — POTASSIUM CHLORIDE 750 MG/1
CAPSULE, EXTENDED RELEASE ORAL
Qty: 90 CAPSULE | Refills: 1 | Status: SHIPPED | OUTPATIENT
Start: 2025-07-08

## 2025-07-10 ENCOUNTER — PATIENT MESSAGE (OUTPATIENT)
Dept: FAMILY MEDICINE | Facility: CLINIC | Age: 75
End: 2025-07-10
Payer: MEDICARE

## 2025-07-10 DIAGNOSIS — Z79.899 ENCOUNTER FOR LONG-TERM (CURRENT) USE OF MEDICATIONS: Chronic | ICD-10-CM

## 2025-07-10 DIAGNOSIS — I15.2 HYPERTENSION ASSOCIATED WITH DIABETES: ICD-10-CM

## 2025-07-10 DIAGNOSIS — E11.59 HYPERTENSION ASSOCIATED WITH DIABETES: ICD-10-CM

## 2025-07-10 RX ORDER — AMLODIPINE BESYLATE 10 MG/1
TABLET ORAL
Qty: 30 TABLET | Refills: 0 | Status: SHIPPED | OUTPATIENT
Start: 2025-07-10

## 2025-07-22 ENCOUNTER — PATIENT OUTREACH (OUTPATIENT)
Dept: ADMINISTRATIVE | Facility: HOSPITAL | Age: 75
End: 2025-07-22
Payer: MEDICARE

## 2025-07-29 ENCOUNTER — LAB VISIT (OUTPATIENT)
Dept: LAB | Facility: HOSPITAL | Age: 75
End: 2025-07-29
Attending: NURSE PRACTITIONER
Payer: MEDICARE

## 2025-07-29 DIAGNOSIS — Z00.00 ANNUAL PHYSICAL EXAM: ICD-10-CM

## 2025-07-29 DIAGNOSIS — I15.2 HYPERTENSION ASSOCIATED WITH DIABETES: ICD-10-CM

## 2025-07-29 DIAGNOSIS — E11.65 TYPE 2 DIABETES MELLITUS WITH HYPERGLYCEMIA, WITHOUT LONG-TERM CURRENT USE OF INSULIN: Chronic | ICD-10-CM

## 2025-07-29 DIAGNOSIS — E11.59 HYPERTENSION ASSOCIATED WITH DIABETES: ICD-10-CM

## 2025-07-29 LAB
ALBUMIN/CREAT UR: 5 UG/MG
CREAT UR-MCNC: 100 MG/DL (ref 23–375)
EAG (OHS): 143 MG/DL (ref 68–131)
ERYTHROCYTE [DISTWIDTH] IN BLOOD BY AUTOMATED COUNT: 13.4 % (ref 11.5–14.5)
HBA1C MFR BLD: 6.6 % (ref 4–5.6)
HCT VFR BLD AUTO: 39.6 % (ref 40–54)
HGB BLD-MCNC: 12.8 GM/DL (ref 14–18)
MCH RBC QN AUTO: 27.9 PG (ref 27–31)
MCHC RBC AUTO-ENTMCNC: 32.3 G/DL (ref 32–36)
MCV RBC AUTO: 87 FL (ref 82–98)
MICROALBUMIN UR-MCNC: 5 UG/ML (ref ?–5000)
PLATELET # BLD AUTO: 257 K/UL (ref 150–450)
PMV BLD AUTO: 10.7 FL (ref 9.2–12.9)
RBC # BLD AUTO: 4.58 M/UL (ref 4.6–6.2)
WBC # BLD AUTO: 6.03 K/UL (ref 3.9–12.7)

## 2025-07-29 PROCEDURE — 36415 COLL VENOUS BLD VENIPUNCTURE: CPT | Mod: PO

## 2025-07-29 PROCEDURE — 82043 UR ALBUMIN QUANTITATIVE: CPT

## 2025-07-29 PROCEDURE — 83036 HEMOGLOBIN GLYCOSYLATED A1C: CPT

## 2025-07-29 PROCEDURE — 85027 COMPLETE CBC AUTOMATED: CPT

## 2025-08-15 ENCOUNTER — PATIENT OUTREACH (OUTPATIENT)
Dept: ADMINISTRATIVE | Facility: HOSPITAL | Age: 75
End: 2025-08-15
Payer: MEDICARE

## 2025-08-29 DIAGNOSIS — N18.31 STAGE 3A CHRONIC KIDNEY DISEASE: Chronic | ICD-10-CM

## 2025-08-29 DIAGNOSIS — E11.65 TYPE 2 DIABETES MELLITUS WITH HYPERGLYCEMIA, WITHOUT LONG-TERM CURRENT USE OF INSULIN: Chronic | ICD-10-CM

## 2025-08-29 DIAGNOSIS — I15.2 HYPERTENSION ASSOCIATED WITH DIABETES: ICD-10-CM

## 2025-08-29 DIAGNOSIS — Z79.899 ENCOUNTER FOR LONG-TERM (CURRENT) USE OF MEDICATIONS: Chronic | ICD-10-CM

## 2025-08-29 DIAGNOSIS — E11.59 HYPERTENSION ASSOCIATED WITH DIABETES: ICD-10-CM

## 2025-08-29 RX ORDER — FAMOTIDINE 20 MG/1
TABLET, FILM COATED ORAL
Qty: 60 TABLET | Refills: 0 | Status: SHIPPED | OUTPATIENT
Start: 2025-08-29

## 2025-08-29 RX ORDER — METFORMIN HYDROCHLORIDE 500 MG/1
TABLET, EXTENDED RELEASE ORAL
Qty: 30 TABLET | Refills: 0 | Status: SHIPPED | OUTPATIENT
Start: 2025-08-29

## 2025-08-29 RX ORDER — AMLODIPINE BESYLATE 10 MG/1
TABLET ORAL
Qty: 30 TABLET | Refills: 0 | Status: SHIPPED | OUTPATIENT
Start: 2025-08-29